# Patient Record
Sex: MALE | Race: WHITE
[De-identification: names, ages, dates, MRNs, and addresses within clinical notes are randomized per-mention and may not be internally consistent; named-entity substitution may affect disease eponyms.]

---

## 2017-03-04 ENCOUNTER — HOSPITAL ENCOUNTER (INPATIENT)
Dept: HOSPITAL 47 - 6ICU | Age: 82
LOS: 1 days | Discharge: HOME | DRG: 287 | End: 2017-03-05
Payer: MEDICARE

## 2017-03-04 VITALS — BODY MASS INDEX: 30.1 KG/M2

## 2017-03-04 DIAGNOSIS — K21.9: ICD-10-CM

## 2017-03-04 DIAGNOSIS — R07.9: Primary | ICD-10-CM

## 2017-03-04 DIAGNOSIS — Z82.49: ICD-10-CM

## 2017-03-04 DIAGNOSIS — E78.5: ICD-10-CM

## 2017-03-04 DIAGNOSIS — R01.1: ICD-10-CM

## 2017-03-04 DIAGNOSIS — N40.0: ICD-10-CM

## 2017-03-04 DIAGNOSIS — F17.200: ICD-10-CM

## 2017-03-04 DIAGNOSIS — J44.9: ICD-10-CM

## 2017-03-04 DIAGNOSIS — I10: ICD-10-CM

## 2017-03-04 DIAGNOSIS — I73.9: ICD-10-CM

## 2017-03-04 DIAGNOSIS — I25.10: ICD-10-CM

## 2017-03-04 DIAGNOSIS — E66.01: ICD-10-CM

## 2017-03-04 DIAGNOSIS — E03.9: ICD-10-CM

## 2017-03-04 DIAGNOSIS — Z79.899: ICD-10-CM

## 2017-03-04 DIAGNOSIS — Z79.82: ICD-10-CM

## 2017-03-04 LAB
ANION GAP SERPL CALC-SCNC: 10 MMOL/L
BUN SERPL-SCNC: 17 MG/DL (ref 9–20)
CALCIUM SPEC-MCNC: 8.9 MG/DL (ref 8.4–10.2)
CHLORIDE SERPL-SCNC: 105 MMOL/L (ref 98–107)
CO2 SERPL-SCNC: 26 MMOL/L (ref 22–30)
GLUCOSE SERPL-MCNC: 142 MG/DL (ref 74–99)
NON-AFRICAN AMERICAN GFR(MDRD): >60
POTASSIUM SERPL-SCNC: 4.1 MMOL/L (ref 3.5–5.1)
SODIUM SERPL-SCNC: 141 MMOL/L (ref 137–145)

## 2017-03-04 PROCEDURE — 93454 CORONARY ARTERY ANGIO S&I: CPT

## 2017-03-04 PROCEDURE — 85379 FIBRIN DEGRADATION QUANT: CPT

## 2017-03-04 PROCEDURE — 80048 BASIC METABOLIC PNL TOTAL CA: CPT

## 2017-03-04 PROCEDURE — 84132 ASSAY OF SERUM POTASSIUM: CPT

## 2017-03-04 PROCEDURE — 4A023N7 MEASUREMENT OF CARDIAC SAMPLING AND PRESSURE, LEFT HEART, PERCUTANEOUS APPROACH: ICD-10-PCS

## 2017-03-04 PROCEDURE — 85025 COMPLETE CBC W/AUTO DIFF WBC: CPT

## 2017-03-04 PROCEDURE — 71275 CT ANGIOGRAPHY CHEST: CPT

## 2017-03-04 PROCEDURE — B2111ZZ FLUOROSCOPY OF MULTIPLE CORONARY ARTERIES USING LOW OSMOLAR CONTRAST: ICD-10-PCS

## 2017-03-04 RX ADMIN — CARVEDILOL SCH MG: 3.12 TABLET, FILM COATED ORAL at 18:05

## 2017-03-04 RX ADMIN — I-VITE, TAB 1000-60-2MG (60/BT) SCH EACH: TAB at 20:37

## 2017-03-04 RX ADMIN — PANTOPRAZOLE SODIUM SCH MG: 40 TABLET, DELAYED RELEASE ORAL at 18:05

## 2017-03-04 RX ADMIN — CEFAZOLIN SCH MLS/HR: 330 INJECTION, POWDER, FOR SOLUTION INTRAMUSCULAR; INTRAVENOUS at 18:04

## 2017-03-04 RX ADMIN — ISOSORBIDE MONONITRATE SCH MG: 30 TABLET, EXTENDED RELEASE ORAL at 18:05

## 2017-03-04 NOTE — CT
CT CHEST FOR PULMONARY EMBOLISM.

 

EXAMINATION TYPE: CT chest angio for PE

 

DATE OF EXAM: 3/4/2017 9:36 PM

 

INDICATION: Elevated D-Dimer Post Cardiac Cath.

 

CT DLP: 424.4 mGycm, Automated exposure control for dose reduction was used.

 

CONTRAST: Patient injected with 100 mL of Omnipaque 350.

 

COMPARISON: 5/28/2015

 

TECHNIQUE: CT of the chest is performed on a spiral scan at 2 mm thick sections.  Study is performed 
with intravenous contrast timed for evaluation for pulmonary embolism.  This will limit additional po
rtions of the evaluation.  3-D MIP images reconstructed by the technologist are reviewed on the compu
ter in the coronal and sagittal planes. 

 

FINDINGS:

No persistent filling defects are evident to suggest an acute pulmonary embolism.

 

There are enlarged pretracheal lymph nodes the larger measuring 1. 4 and1.0 cm in size. Aortopulmonic
 window lymphadenopathy is present measuring 1.0 cm. Superior mediastinal nodes are present not enlar
ged by CT criteria.  The ascending aorta diameter at the level of the main pulmonary artery is 3.4 cm
.  The main pulmonary artery diameter at the bifurcation is 2.5 cm.

 

There is an area of pneumonitis within the lingula. Underlying mass is not excluded. Follow-up is rec
ommended. Series 5 image 61. There is a pneumatocele within the posterior lateral right lung base. Th
ere is a calcified granuloma within the posterior lateral right lung apex measuring 0.5 cm. Series 5 
image 34. Punctate peripheral density measuring 3 mm in the posterior lateral right upper lung field.
 Series 5 image 46. Lung windows are otherwise clear.

 

Limited CT sections are obtained through the upper abdomen. There is a 3.7 cm cyst measuring 20 Houns
field units on the posterior lateral superior pole right kidney. There may be superior pole renal jana
cification on the right measuring 0.9 cm. Vascular calcification is within the aorta. Retrocaval leslie
on appears unremarkable. No retrocrural adenopathy is evident.

 

IMPRESSIONS:

1. No acute pulmonary embolism.

2. Enlarged mediastinal adenopathy. Follow-up is recommended.

3. Scattered areas of pneumonitis and tiny pulmonary nodules. Follow-up chest CT in 3 months is recom
mended to confirm stability and/or clearing

## 2017-03-04 NOTE — P.HPIM
History of Present Illness


H&P Date: 03/04/17


Chief Complaint: Unstable angina, recurrent chest pain, severe dyspnea and 

shortness of kwame





84-year-old  male one of my office patient with multiple medical 

problem known to have history of COPD, hypertension, hyperlipidemia, 

hyperglycemia, BPH and previous history of disease was on medical management 

with heart catheter 3 years ago finding consistent with mild coronary artery 

disease.  Patient has been doing well until the day before yesterday when he 

attempts to shovel and cleaning this know of his yard developed to have mild 

dyspnea and shortness of breath or symptoms become much worse over time.  His 

shortness of breath continue to be bothersome.  Patient ended up coming to the 

emergency department at [facility] where was seen and evaluated 

surprisingly his EKG had significant abnormality in change.  Patient was seen 

by Dr. Myrick and decided to transfer him to [facility] for heart 

catheter.  Was admitted to the ICU initially and prepare for going for cath.  

His troponin was marginal with no further abnormality at this point.





Review of Systems


Constitutional: Reports fatigue, Reports weakness, Reports weight gain, Denies 

as per HPI, Denies anorexia, Denies chills, Denies chronic headaches, Denies 

chronic pain, Denies daytime sleepiness, Denies fever, Denies lethargy, Denies 

malaise, Denies night sweats, Denies poor appetite, Denies sweats, Denies 

weight loss


Eyes: bilateral as per HPI, bilateral decreased vision


Ears: bilateral: decreased hearing


Ears, nose, mouth and throat: Reports headache, Reports nasal congestion, 

Reports nasal discharge, Reports sinus pressure, Denies as per HPI, Denies ant. 

neck pain, Denies bleeding gums, Denies dental pain, Denies dysphagia, Denies 

epistaxis, Denies hoarseness, Denies mouth pain, Denies neck fullness/pressure, 

Denies neck lump, Denies nose pain, Denies odynophagia, Denies post-nasal drip, 

Denies sinus pain, Denies swelling in mouth, Denies swelling in throat, Denies 

sore throat, Denies vertigo, Denies voice changes


Cardiovascular: Reports chest pain, Reports claudication, Reports decreased 

exercise tolerance, Reports dyspnea on exertion, Reports edema, Reports high 

blood pressure, Reports irregular heart beat, Reports leg edema, Reports 

lightheadedness, Reports palpitations, Reports paroxysmal nocturnal dyspnea, 

Reports rapid heart beat, Reports shortness of breath, Denies as per HPI, 

Denies orthopnea, Denies phlebitis, Denies syncope


Respiratory: Reports congestion, Reports cough with sputum, Reports dyspnea, 

Reports home oxygen, Reports pleurisy, Reports respiratory infections, Reports 

sleep apnea, Denies as per HPI, Denies cough, Denies excessive sputum, Denies 

hemoptysis, Denies pain, Denies pain on inspiration, Denies snoring, Denies 

wheezing


Gastrointestinal: Reports belching, Reports bloating, Reports change in bowel 

habits, Reports constipation, Reports dyspepsia, Reports excessive gas, Reports 

heartburn, Reports loss of appetite, Reports nausea, Denies as per HPI, Denies 

abdominal pain, Denies BRBPR, Denies coffee ground emesis, Denies diarrhea, 

Denies early satiety, Denies hematemesis, Denies hematochezia, Denies 

indigestion, Denies jaundice, Denies lactose intolerance, Denies melena, Denies 

vomiting


Genitourinary: Reports discharge, Reports flank pain, Reports nocturia, Reports 

polyuria, Denies as per HPI, Denies decreased libido, Denies difficulties 

fathering child, Denies dysuria, Denies erectile dysfunction, Denies genital 

sores, Denies hematuria, Denies impotence, Denies incontinence, Denies kidney 

stones, Denies testicular lump, Denies testicular pain, Denies urinary frequency

, Denies urinary hesitancy, Denies urinary retention


Musculoskeletal: Reports arm numbness/tingling, Reports limitation of motion, 

Reports loss of height, Reports low back pain, Reports myalgias, Reports neck 

pain, Reports neck stiffness, Denies as per HPI, Denies atrophy, Denies 

fractures, Denies frequent falls, Denies gait dysfunction, Denies hot joints, 

Denies leg numbness/tingling, Denies morning stiffness, Denies muscle cramps, 

Denies muscle weakness, Denies prior amputations, Denies redness of joints, 

Denies shooting arm pain, Denies shooting leg pain


Integumentary: Reports dryness, Reports pruritus, Reports rash, Denies as per 

HPI, Denies acne, Denies boils, Denies brittle nails, Denies change in hair/

nails, Denies color changes, Denies darkening of skin, Denies depigmentation, 

Denies foot/leg ulcers, Denies growths, Denies hirsutism, Denies lesions, 

Denies onychomycosis, Denies sores, Denies striae, Denies unusual bruising, 

Denies wounds


Neurological: Reports ataxia, Reports numbness, Reports paresthesias, Reports 

tingling, Reports tremors, Reports weakness, Denies as per HPI, Denies aphasia, 

Denies balance difficulties, Denies burning pain, Denies change in mentation, 

Denies change in smell/taste, Denies change in speech, Denies confusion, Denies 

convulsions, Denies double vision, Denies gait dysfunction, Denies head injury, 

Denies headaches, Denies hearing difficulties, Denies lack of coordination, 

Denies loss of vision, Denies memory loss, Denies migraines, Denies motor 

disturbance, Denies paralysis, Denies seizures, Denies sensory deficit, Denies 

spasticity, Denies syncope, Denies tic, Denies transient paralysis, Denies 

vertigo, Denies visual changes


Psychiatric: Reports anhedonia, Reports anxiety, Reports depression, Reports 

memory loss, Reports mood swings, Denies as per HPI, Denies anxiety attacks, 

Denies change in appetite, Denies change in libido, Denies change in sleep 

habits, Denies confusion, Denies difficulty concentrating, Denies disorientation

, Denies hallucinations, Denies hopelessness, Denies hypersomnia, Denies 

insomnia, Denies irritability, Denies paranoia, Denies sadness/tearfulness, 

Denies sleep disturbances, Denies suicidal ideation


Endocrine: Reports excessive sweating, Reports fatigue, Reports flushing, 

Reports heat intolerance, Denies as per HPI, Denies cold intolerance, Denies 

deepening of the voice, Denies excessive thirst, Denies high blood sugars, 

Denies increase in ring/shoe/hat size, Denies low blood sugars, Denies nocturia

, Denies palpitations, Denies polydipsia, Denies polyphagia, Denies polyuria, 

Denies proptosis, Denies recent glucocorticoid use, Denies thyroid mass, Denies 

weight change


Hematologic/Lymphatic: Reports easy bruising, Denies as per HPI, Denies easy 

bleeding, Denies lymphadenopathy, Denies lymphedema, Denies thrombophilia


Allergic/Immunologic: Reports allergic rhinitis, Denies as per HPI, Denies 

anaphylaxis, Denies angioedema, Denies gluten intolerance, Denies persistent 

infections, Denies seasonal allergies, Denies urticaria, Denies wheezing





Past Medical History


Past Medical History: Hyperlipidemia, Hypertension, Thyroid Disorder


History of Any Multi-Drug Resistant Organisms: None Reported


Past Surgical History: Appendectomy, Cholecystectomy, Heart Catheterization, 

Orthopedic Surgery


Past Anesthesia/Blood Transfusion Reactions: No Reported Reaction


Past Psychological History: No Psychological Hx Reported


Smoking Status: Current every day smoker


Past Alcohol Use History: None Reported


Past Drug Use History: None Reported





- Past Family History


  ** Mother


Family Medical History: CVA/TIA, Hypertension





  ** Father


Additional Family Medical History / Comment(s): commited suicide





Medications and Allergies


 Home Medications











 Medication  Instructions  Recorded  Confirmed  Type


 


Aspirin EC [Ecotrin] 325 mg PO HS 05/28/15 05/28/15 History


 


Isosorbide Mononitrate ER [Imdur] 30 mg PO DAILY 05/28/15 05/28/15 History


 


Levothyroxine Sodium [Synthroid] 75 mcg PO DAILY 05/28/15 05/28/15 History


 


Omeprazole [PriLOSEC] 20 mg PO AC-BID 05/28/15 05/28/15 History


 


Temazepam [Restoril] 15 mg PO HS PRN 05/28/15 05/28/15 History


 


Vit A,C & E/Lutein/Minerals 1 each PO BID 05/28/15 05/28/15 History





[Ocuvite with Lutein Tablet]    











 Allergies











Allergy/AdvReac Type Severity Reaction Status Date / Time


 


No Known Allergies Allergy   Verified 05/28/15 18:53














Physical Exam


Vitals: 


 Intake and Output











 03/03/17 03/04/17 03/04/17





 22:59 06:59 14:59


 


Other:   


 


  Weight   84.7 kg








 Patient Weight











 03/05/17





 06:59


 


Weight 84.7 kg














- Constitutional


General appearance: no average body habitus, cooperative, no disheveled, no 

mild distress, no morbidly obese, no no acute distress, no obese, no severe 

distress, thin





- EENT


Eyes: no abnormal pupil, no anicteric sclerae, no disc margins sharp, no 

edentulous, no EOMI, no PERRLA, no fundus normal, no photophobia, no dentition 

normal, no poor dentition, no ptosis, no scleral icterus, normal appearance


ENT: hard of hearing, no hearing grossly normal, no NA/AT, normal oropharynx, 

no other, no pharyngeal erythema, no thrush, no tonsillar exudates, no 

tonsillar swelling


Ears: bilateral: normal, bulging





- Neck


Neck: no lymphadenopathy, normal ROM, no other, no rigidity, no stridor, no 

thyromegaly


Carotids: bilateral: upstroke normal


Thyroid: bilateral: normal size, enlarged





- Respiratory


Respiratory: bilateral: CTA, diminished





- Cardiovascular


Rhythm: regular


Heart sounds: normal: S1, S2


Abnormal Heart Sounds: systolic murmur, S3 Gallop





- Gastrointestinal


General gastrointestinal: no absent bowel sounds, no decreased bowel sounds, 

distended, hepatomegaly, hyperactive bowel sounds, no normal bowel sounds, no 

organomegaly, no rigid, no scaphoid, soft, no splenomegaly, no tenderness, no 

umbilical hernia, no ventral hernia





- Integumentary


Integumentary: no calor, no cellulitis, no cyanotic, no decreased turgor, no 

flushed, no jaundiced, normal, no normal turgor, pale, rash, no ulcer





- Neurologic


Neurologic: CNII-XII intact





- Musculoskeletal


Musculoskeletal: gait normal, generalized weakness, no strength equal 

bilaterally, no right sided weakness, no left sided weakness





- Psychiatric


Psychiatric: A&O x's 3, appropriate affect





Thrombosis Risk Factor Assmnt





- DVT/VTE Prophylaxis


DVT/VTE Prophylaxis: Pharmacologic Prophylaxis ordered, Mechanical Prophylaxis 

ordered





Assessment and Plan


Plan: 





1 unstable angina: Patient be hospitalized continue heparin drip continue nitro 

patient be seen cardiology will be going for heart cath today.





2 CAD: Known to have coronary disease from previous heart cath with medical 

management up to now with his recurrent symptom in recurrent chest pain and 

shortness of breath his heart cath this time might be positive might require 

angioplasty.





3 COPD: Continue patient on his current inhaler.





4 hypertension: Remain on carvedilol 3.25 g twice a day.





5 hyperlipidemia: On atorvastatin 80 mg daily.





6 hypothyroidism: Remain on Synthroid 75 g daily.





7 severe GERD: Will do omeprazole 20 mg twice a day.





8 DVT prophylaxis: Patient will be on heparin drip for now and switch to 

heparin subcutaneous and knee-high GIL hose.





CODE STATUS: Full code.





Expectation from's admission: Patient in the hospital for more than 2 nights.

## 2017-03-04 NOTE — CC
DATE OF SERVICE:  03/04/2017 



PERFORMING PHYSICIAN: 

Kyle Church MD, interventional cardiologist. 



PROCEDURE PERFORMED: 

Selective right and left coronary angiogram. 



INDICATION: This is a pleasant 84-year-old gentleman with known 

history of coronary artery disease, hypertension, dyslipidemia 

presented to [facility] Emergency Room with chest 

discomfort. He was seen and evaluated by Dr. Myrick who noted ischemic 

EKG changes. Heart catheterization was recommended from the left 

radial approach in view of the patient's history of peripheral 

arterial disease and prior femorofemoral bypass.  



COMPLICATIONS: 

None. 



LEVEL OF SEDATION: 

Moderate with sedation length of 30 minutes. 



APPROACH: 

Left radial artery. 



PROCEDURE DESCRIPTION: After obtaining informed consent, the patient 

was brought to the cardiac cath lab. The left radial artery was 

cannulated using micropuncture technique. The micropuncture wire 

passed easily. Then I placed 6 Nepali sheath in the left radial 

artery. Subsequently I gave the patient 2 mg of verapamil IA and 3000 

units of heparin IV. Then I did selective right and left coronary 

angiogram using JR4 and JL 3.5 catheters.  



The procedure was completed without any complication. 



SELECTIVE CORONARY ANGIOGRAM: 

1. The right coronary artery is a large-caliber vessel, a dominant 

vessel, and calcified vessel. The ostial right coronary artery has 

mild disease only. The midright has mild disease only as well. The 

right distally appeared to be angiographically normal and bifurcates 

into PDA and PLV branches; both are angiographically normal. The right 

coronary artery in the midportion gives rise into a medium size acute 

marginal branch.  

2. The left main is calcified with ostial and distal left main 

disease, appeared to be in the range of 30%. The left main bifurcates 

into the left circumflex and left anterior descending artery.  

3. The left circumflex is a large-caliber vessel. It is a nondominant 

vessel. The proximal left circumflex is angiographically normal and 

gives rises into the first OM branch, which is a large-caliber vessel, 

seems to be angiographically normal. The left circumflex after the 

first OM has a lesion seems to be in the range of 50% to 60%. The left 

circumflex distally has mild disease only.  

4. Left anterior descending artery. The proximal left anterior 

descending artery appeared to have mild disease only. This is by the 

bifurcation of the first diagonal branch, which is a medium caliber 

vessel, seems to be angiographically normal. The mid LAD and distal 

LAD appeared to be angiographically normal.  



CONCLUSION: 

1. Dominant right coronary system. 

2. Calcified right and left coronary. 

3. Intermediate disease involving the mid left circumflex coronary 

artery.  

4. Intermediate disease involving ostial and distal left main coronary 

artery.  



POSTPROCEDURE MANAGEMENT: 

1. Maximize medical treatment is recommended. 

2. Follow up with the patient.

## 2017-03-05 VITALS — TEMPERATURE: 98 F

## 2017-03-05 VITALS — DIASTOLIC BLOOD PRESSURE: 68 MMHG | RESPIRATION RATE: 21 BRPM | SYSTOLIC BLOOD PRESSURE: 140 MMHG | HEART RATE: 75 BPM

## 2017-03-05 LAB
ANION GAP SERPL CALC-SCNC: 8 MMOL/L
BASOPHILS # BLD AUTO: 0 K/UL (ref 0–0.2)
BASOPHILS NFR BLD AUTO: 1 %
BUN SERPL-SCNC: 15 MG/DL (ref 9–20)
CALCIUM SPEC-MCNC: 8.8 MG/DL (ref 8.4–10.2)
CH: 30.6
CHCM: 33.1
CHLORIDE SERPL-SCNC: 105 MMOL/L (ref 98–107)
CO2 SERPL-SCNC: 27 MMOL/L (ref 22–30)
EOSINOPHIL # BLD AUTO: 0.3 K/UL (ref 0–0.7)
EOSINOPHIL NFR BLD AUTO: 7 %
ERYTHROCYTE [DISTWIDTH] IN BLOOD BY AUTOMATED COUNT: 4.07 M/UL (ref 4.3–5.9)
ERYTHROCYTE [DISTWIDTH] IN BLOOD: 14 % (ref 11.5–15.5)
GLUCOSE SERPL-MCNC: 112 MG/DL (ref 74–99)
HCT VFR BLD AUTO: 37.8 % (ref 39–53)
HDW: 2.6
HGB BLD-MCNC: 12 GM/DL (ref 13–17.5)
LUC NFR BLD AUTO: 6 %
LYMPHOCYTES # SPEC AUTO: 1.2 K/UL (ref 1–4.8)
LYMPHOCYTES NFR SPEC AUTO: 28 %
MCH RBC QN AUTO: 29.4 PG (ref 25–35)
MCHC RBC AUTO-ENTMCNC: 31.7 G/DL (ref 31–37)
MCV RBC AUTO: 92.9 FL (ref 80–100)
MONOCYTES # BLD AUTO: 0.5 K/UL (ref 0–1)
MONOCYTES NFR BLD AUTO: 13 %
NEUTROPHILS # BLD AUTO: 1.9 K/UL (ref 1.3–7.7)
NEUTROPHILS NFR BLD AUTO: 45 %
NON-AFRICAN AMERICAN GFR(MDRD): >60
POTASSIUM SERPL-SCNC: 3.8 MMOL/L (ref 3.5–5.1)
SODIUM SERPL-SCNC: 140 MMOL/L (ref 137–145)
WBC # BLD AUTO: 0.27 10*3/UL
WBC # BLD AUTO: 4.3 K/UL (ref 3.8–10.6)
WBC (PEROX): 4.43

## 2017-03-05 RX ADMIN — PANTOPRAZOLE SODIUM SCH MG: 40 TABLET, DELAYED RELEASE ORAL at 08:36

## 2017-03-05 RX ADMIN — ISOSORBIDE MONONITRATE SCH MG: 30 TABLET, EXTENDED RELEASE ORAL at 08:36

## 2017-03-05 RX ADMIN — CEFAZOLIN SCH MLS/HR: 330 INJECTION, POWDER, FOR SOLUTION INTRAMUSCULAR; INTRAVENOUS at 03:49

## 2017-03-05 RX ADMIN — CARVEDILOL SCH MG: 3.12 TABLET, FILM COATED ORAL at 08:38

## 2017-03-05 RX ADMIN — I-VITE, TAB 1000-60-2MG (60/BT) SCH EACH: TAB at 08:36

## 2017-03-05 NOTE — P.DS
Providers


Date of admission: 


03/04/17 11:57





Attending physician: 


Sonido Weber





Consults: 





 





03/04/17 12:32


Consult Physician Routine 


   Consulting Provider: Kyle Church


   Consult Reason/Comments: ekg changes


   Do you want consulting provider notified?: Already Contacted











Primary care physician: 


Sonido Weber





[facility] Course: 





Chief Complaint: Unstable angina, recurrent chest pain, severe dyspnea and 

shortness of kwame





84-year-old  male one of my office patient with multiple medical 

problem known to have history of COPD, hypertension, hyperlipidemia, 

hyperglycemia, BPH and previous history of disease was on medical management 

with heart catheter 3 years ago finding consistent with mild coronary artery 

disease.  Patient has been doing well until the day before yesterday when he 

attempts to shovel and cleaning this know of his yard developed to have mild 

dyspnea and shortness of breath or symptoms become much worse over time.  His 

shortness of breath continue to be bothersome.  Patient ended up coming to the 

emergency department at [facility] where was seen and evaluated 

surprisingly his EKG had significant abnormality in change.  Patient was seen 

by Dr. Myrick and decided to transfer him to [facility] for heart 

catheter.  Was admitted to the ICU initially and prepare for going for cath.  

His troponin was marginal with no further abnormality at this point.





Patient was resting comfortably until he ended up going for his heart cath.  

Findings were consistent exactly with the same finding 3 years ago as a mild 

coronary artery disease does not requiring except medical management no 

angioplasty or stent was placed at this time.


The area of heart cath looks good with no sign of hematoma.








 Assessment and Plan 


Plan: 





1 mild coronary artery disease: Heart catheter was done with finding consistent 

with mild disease only will be on medical management.





2 chest pain: Patient be hospitalized continue heparin drip continue nitro 

patient be seen cardiology, cardiac catheter came back with no major finding 

just mild coronary disease exactly the same finding was 3 years ago and patient 

be on medical management only.





3 CAD: Known to have coronary disease from previous heart cath with medical 

management up to now with his recurrent symptom in recurrent chest pain and 

shortness of breath his heart cath this time might be positive might require 

angioplasty.





4 COPD: Continue patient on his current inhaler.





5 hypertension: Remain on carvedilol 3.25 g twice a day.





6 hyperlipidemia: On atorvastatin 80 mg daily.





7 hypothyroidism: Remain on Synthroid 75 g daily.





8 severe GERD: Will do omeprazole 20 mg twice a day.





9 DVT prophylaxis: Patient will be on heparin drip for now and switch to 

heparin subcutaneous and knee-high GIL hose.








Patient has done very well his final diagnoses was mild coronary disease with 

negative testing require any angioplasty or stent placement.





Plan - Discharge Summary


New Discharge Prescriptions: 


Furosemide [Lasix] 20 mg PO Q48H #30 tab


amLODIPine [Norvasc] 10 mg PO DAILY #30 tab


Discharge Medication List





Aspirin EC [Ecotrin] 325 mg PO HS 05/28/15 [History]


Isosorbide Mononitrate ER [Imdur] 30 mg PO DAILY 05/28/15 [History]


Omeprazole [PriLOSEC] 20 mg PO AC-BID 05/28/15 [History]


Temazepam [Restoril] 15 mg PO HS PRN 05/28/15 [History]


Vit A,C & E/Lutein/Minerals [Ocuvite with Lutein Tablet] 1 tab PO BID 05/28/15 [

History]


Fludrocortisone [Florinef] 0.05 mg PO DAILY 03/04/17 [History]


Levothyroxine Sodium [Synthroid] 100 mcg PO DAILY 03/04/17 [History]


Simvastatin [Zocor] 40 mg PO HS 03/04/17 [History]


Albuterol Inhaler [Ventolin Hfa Inhaler] 2 puff INHALATION RT-QID PRN #0 puff 03 /05/17 [Rx]


Carvedilol [Coreg] 3.125 mg PO BID-W/MEALS  tab 03/05/17 [Rx]


Furosemide [Lasix] 20 mg PO Q48H #30 tab 03/05/17 [Rx]


Nitroglycerin Sl Tabs [Nitrostat] 0.4 mg SUBLINGUAL Q5M PRN #0 tab 03/05/17 [Rx]


amLODIPine [Norvasc] 10 mg PO DAILY #30 tab 03/05/17 [Rx]








Follow up Appointment(s)/Referral(s): 


Sonido Weber MD [Primary Care Provider] - 1 Week


Patient Instructions/Handouts:  Dyspnea (GEN)

## 2017-03-06 NOTE — CONS
DATE OF CONSULTATION:  





CHIEF COMPLAINT: Chest tightness and shortness of breath.



Nabeel  is an 84-year-old gentleman with history of hypertension and 

dyslipidemia.  This is an 84-year-old gentleman with history of 

hypertension and dyslipidemia who used to see me in the past, has not 

been  seen by me over the last few years, comes in complaining of 

moderate to severe in intensity exertion, shortness of breath and 

exertional chest tightness that started on Thursday.  It has been 

getting progressively worse. Hence he came to the emergency room where 

I am evaluating him. He presented to [facility].  His 

is pain is resolved with rest. It is associated with mild diaphoresis. 

EKG shows sinus rhythm with deep symmetrical T-wave inversions in the 

precordial leads suggestive of ischemia in LAD distribution. Patient 

states that he had a cardiac catheterization by me many years ago and 

was told that he did not have significant obstructive CAD. He also has 

hypertension, and dyslipidemia. 



MEDICATIONS: Patient is currently on:

1. Imdur 30 mg daily.

2. Synthroid 75 mcg daily.

3. Simvastatin 40 mg daily.

4. Aspirin.

5. Coreg.

6. Prilosec. 



ALLERGIES: He denies any. 



FAMILY HISTORY: Negative for premature coronary artery disease. 



SOCIAL HISTORY: Negative for smoking, ETOH abuse, or drug abuse. 



REVIEW OF SYSTEMS:

HEENT: Unremarkable. 

CARDIAC: As described above. 

RESPIRATORY:  As described above. 

GI: Negative. 

GENITOURINARY: Negative.

ENDOCRINE: Negative. 

SKIN: Negative. 

MUSCULOSKELETAL: Significant for arthritis. 

PSYCHOSOCIAL: Negative. 

ENDOCRINE: Negative. 

Dermatology: Negative. 

CONSTITUTIONAL: Negative. Oncological: Negative. 



The rest of the system review is not relevant. 



Past medical history is significant for hypertension, dyslipidemia, 

GERD and possible CAD. Past medical history also significant for 

peripheral vascular disease, femorofemoral crossover bypass graft many 

years ago done by Dr. Valencia, follows with Dr. Loomis regularly. 



On exam he is comfortable at rest. Heart rate is 65 beats per minute, 

blood pressure is 160/72, respiratory rate is 18. There is no jugular 

venous distention. Carotid upstroke is normal. There is no bruit. 

Chest exam reveals good air entry bilaterally. Heart exam reveals 

first and second heart sounds. No gallop. No murmur, no rub.  

ABDOMEN: Soft, nontender. Exam of extremities did not reveal any 

edema. Peripheral pulses are palpable, but diminished. Radial pulses 

are palpable.  

CARDIAC :  On exam there is an ejection systolic murmur in the aortic 

area.  



Labs are. Labs are pending at this time. 



ASSESSMENT:

1. Unstable angina. 

2. History of hypertension. 

3. History of dyslipidemia. 

4. Coronary artery disease.

5. Hypothyroidism. 

6. Peripheral vascular disease, status post femorofemoral crossover 

bypass graft.  



PLAN: I am going to transfer the patient, using optimal medical 

therapy at the moment. We will transfer him over to [facility] for an emergent cardiac catheterization with a view to 

performing angioplasty.  My associate, Dr. Church perform this.  This 

will be a radial cath.

## 2017-06-26 ENCOUNTER — HOSPITAL ENCOUNTER (OUTPATIENT)
Dept: HOSPITAL 47 - RADCTMAIN | Age: 82
Discharge: HOME | End: 2017-06-26
Payer: MEDICARE

## 2017-06-26 DIAGNOSIS — N28.89: Primary | ICD-10-CM

## 2017-06-26 DIAGNOSIS — R31.9: ICD-10-CM

## 2017-06-26 DIAGNOSIS — I72.4: ICD-10-CM

## 2017-06-26 DIAGNOSIS — K57.30: ICD-10-CM

## 2017-06-26 PROCEDURE — 74176 CT ABD & PELVIS W/O CONTRAST: CPT

## 2017-06-26 NOTE — CT
EXAMINATION TYPE: CT abdomen pelvis wo con

 

DATE OF EXAM: 6/26/2017

 

COMPARISON: Correlation CT chest 3/4/2017.

 

HISTORY: 84-year-old male stones, hematuria

 

CT DLP: 608.4 mGycm. Automated exposure control for dose reduction was used.

 

TECHNIQUE: Contiguous axial scanning of the abdomen and pelvis without IV contrast. Coronal and sagit
chuckie reconstructions performed. 

 

FINDINGS: 

The heart is normal size without pericardial effusion. However, there are coronary vessel calcificati
ons which are a marker for coronary artery disease.

 

Partially visualized right paraesophageal lymphadenopathy measuring up to 1.5 cm. This appears slight
ly less bulky as compared to 3/4/2017 but is incompletely imaged. Interstitial and reticular changes 
are present in the lower lungs especially anteriorly. Nonspecific air cyst posterior right lower lobe
. No pleural effusion.

 

Noncontrast appearance of the liver, adrenal glands, left kidney, spleen, and pancreas within normal 
limits.

 

2.8 cm cyst within the lateral right kidney. No hydronephrosis on either side. However, there were a 
couple pain and 2 mm densities along the distal right ureter, axial image 123 and 125.

 

Mildly enlarged upper abdominal lymph node in the pericaval region measuring 1 cm is unchanged. No me
senteric lymphadenopathy seen.

 

No dilated small bowel, free fluid, or free air.

 

Tiny fatty umbilical hernia.

 

There is distal colonic diverticulosis, greatest in the sigmoid colon. No pericolonic inflammation.

 

Bladder is underdistended but appears to have circumferential wall thickening.  No abnormal fluid col
lection in the pelvis or pelvic lymphadenopathy.

 

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
t common femoral artery, axial image 135. The lack of IV contrast limits assessment of patency.  Mode
rate atherosclerotic calcifications within the abdominal aorta and iliac arteries. 

 

Bones: Degenerative changes in the lumbar spine. No osseous destructive process.

 

 

 

IMPRESSION: 

1.  Partially visualized paraesophageal lymphadenopathy as seen on chest CT of 3/4/2017. Further clin
ical correlation is recommended as to etiology.

2.  No hydronephrosis on either side. However, a couple punctate densities are present along the dist
al right ureter that could represent tiny 2 mm or smaller ureteral calculi.

3.  Distal colonic diverticulosis, greatest in the sigmoid colon.

4.  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

## 2017-07-29 ENCOUNTER — HOSPITAL ENCOUNTER (OUTPATIENT)
Dept: HOSPITAL 47 - RADPETMAIN | Age: 82
Discharge: HOME | End: 2017-07-29
Payer: MEDICARE

## 2017-07-29 DIAGNOSIS — R59.9: Primary | ICD-10-CM

## 2017-07-29 PROCEDURE — 78815 PET IMAGE W/CT SKULL-THIGH: CPT

## 2017-07-31 NOTE — PE
Nuclear medicine PET/CT

 

HISTORY: Reactive hyperplasia lymph node, lymphadenopathy, R 59.9

 

Patient received 10.2 mCi F-18 FDG intravenously delayed scanning was performed from the skull base t
o the mid thighs. Localization and attenuation correction CT scan was also performed. Exam is correla
deana to CT abdomen pelvis 6/26/2017, CT chest 3/4/2017

 

Neck and chest: Retrocaval pretracheal mediastinal adenopathy is again noted, there are prevascular n
odes also present as well as small nodes in the superior mediastinum. Subcarinal and bilateral hilar 
nodes are also present.  There is associated hypermetabolic uptake within the mediastinum, retrocaval
 pretracheal nodes, SUV is 7.1. Left upper lobe lung nodule medially measures approximately 12 mm, th
is is pleural-based along the anterior mediastinum, axial image 93 and 94, SUV is  4.4. Hilar uptake 
SUV 5-7.3. Subcarinal adria uptake 7.7. Prevascular node SUV 6.2. Superior mediastinal node SUV 2.8.

 

Abdomen pelvis: At the level of the head of the pancreas there is a node present anterior to the infe
rior vena cava which measures approximately 16 mm in short axis, associated hypermetabolic uptake is 
showing SUV 6.6. Some enlarged nodes also present at the level of the marlo hepatus, SUV is 5.9.

 

Femoral-femoral bypass graft is noted. No free fluid.

 

Osseous structures: Degenerative disc disease, facet arthropathy noted in the lumbar spine.

 

IMPRESSION: Adenopathy as described.

## 2017-08-25 ENCOUNTER — HOSPITAL ENCOUNTER (OUTPATIENT)
Dept: HOSPITAL 47 - LABPAT | Age: 82
Discharge: HOME | End: 2017-08-25
Payer: MEDICARE

## 2017-08-25 DIAGNOSIS — Z01.812: Primary | ICD-10-CM

## 2017-08-25 DIAGNOSIS — R91.8: ICD-10-CM

## 2017-08-25 LAB
ANION GAP SERPL CALC-SCNC: 8 MMOL/L
BASOPHILS # BLD AUTO: 0.1 K/UL (ref 0–0.2)
BASOPHILS NFR BLD AUTO: 1 %
BUN SERPL-SCNC: 16 MG/DL (ref 9–20)
CH: 31.6
CHCM: 33.2
CHLORIDE SERPL-SCNC: 104 MMOL/L (ref 98–107)
CO2 SERPL-SCNC: 25 MMOL/L (ref 22–30)
EOSINOPHIL # BLD AUTO: 0.3 K/UL (ref 0–0.7)
EOSINOPHIL NFR BLD AUTO: 6 %
ERYTHROCYTE [DISTWIDTH] IN BLOOD BY AUTOMATED COUNT: 4.42 M/UL (ref 4.3–5.9)
ERYTHROCYTE [DISTWIDTH] IN BLOOD: 14.1 % (ref 11.5–15.5)
HCT VFR BLD AUTO: 42.2 % (ref 39–53)
HDW: 2.54
HGB BLD-MCNC: 13.9 GM/DL (ref 13–17.5)
LUC NFR BLD AUTO: 4 %
LYMPHOCYTES # SPEC AUTO: 1.3 K/UL (ref 1–4.8)
LYMPHOCYTES NFR SPEC AUTO: 24 %
MCH RBC QN AUTO: 31.4 PG (ref 25–35)
MCHC RBC AUTO-ENTMCNC: 32.9 G/DL (ref 31–37)
MCV RBC AUTO: 95.4 FL (ref 80–100)
MONOCYTES # BLD AUTO: 0.5 K/UL (ref 0–1)
MONOCYTES NFR BLD AUTO: 10 %
NEUTROPHILS # BLD AUTO: 2.9 K/UL (ref 1.3–7.7)
NEUTROPHILS NFR BLD AUTO: 55 %
NON-AFRICAN AMERICAN GFR(MDRD): 60
PH UR: 5 [PH] (ref 5–8)
POTASSIUM SERPL-SCNC: 4.7 MMOL/L (ref 3.5–5.1)
SODIUM SERPL-SCNC: 137 MMOL/L (ref 137–145)
SP GR UR: 1.01 (ref 1–1.03)
UA BILLING (MACRO VS. MICRO): (no result)
UROBILINOGEN UR QL STRIP: <2 MG/DL (ref ?–2)
WBC # BLD AUTO: 0.22 10*3/UL
WBC # BLD AUTO: 5.3 K/UL (ref 3.8–10.6)
WBC (PEROX): 5.67

## 2017-08-25 PROCEDURE — 84520 ASSAY OF UREA NITROGEN: CPT

## 2017-08-25 PROCEDURE — 85025 COMPLETE CBC W/AUTO DIFF WBC: CPT

## 2017-08-25 PROCEDURE — 82565 ASSAY OF CREATININE: CPT

## 2017-08-25 PROCEDURE — 80051 ELECTROLYTE PANEL: CPT

## 2017-08-25 PROCEDURE — 81003 URINALYSIS AUTO W/O SCOPE: CPT

## 2017-08-31 ENCOUNTER — HOSPITAL ENCOUNTER (OUTPATIENT)
Dept: HOSPITAL 47 - LABPAT | Age: 82
Discharge: HOME | End: 2017-08-31
Payer: MEDICARE

## 2017-08-31 DIAGNOSIS — Z01.812: Primary | ICD-10-CM

## 2017-08-31 PROCEDURE — 86900 BLOOD TYPING SEROLOGIC ABO: CPT

## 2017-08-31 PROCEDURE — 86850 RBC ANTIBODY SCREEN: CPT

## 2017-08-31 PROCEDURE — 86901 BLOOD TYPING SEROLOGIC RH(D): CPT

## 2017-09-01 ENCOUNTER — HOSPITAL ENCOUNTER (INPATIENT)
Dept: HOSPITAL 47 - 2ORMAIN | Age: 82
LOS: 14 days | Discharge: HOME HEALTH | DRG: 166 | End: 2017-09-15
Payer: MEDICARE

## 2017-09-01 VITALS — BODY MASS INDEX: 30 KG/M2

## 2017-09-01 DIAGNOSIS — Z79.899: ICD-10-CM

## 2017-09-01 DIAGNOSIS — K21.9: ICD-10-CM

## 2017-09-01 DIAGNOSIS — F17.200: ICD-10-CM

## 2017-09-01 DIAGNOSIS — R33.8: ICD-10-CM

## 2017-09-01 DIAGNOSIS — R59.0: ICD-10-CM

## 2017-09-01 DIAGNOSIS — N40.1: ICD-10-CM

## 2017-09-01 DIAGNOSIS — H35.30: ICD-10-CM

## 2017-09-01 DIAGNOSIS — Z88.8: ICD-10-CM

## 2017-09-01 DIAGNOSIS — I73.9: ICD-10-CM

## 2017-09-01 DIAGNOSIS — E78.5: ICD-10-CM

## 2017-09-01 DIAGNOSIS — I25.10: ICD-10-CM

## 2017-09-01 DIAGNOSIS — C34.12: Primary | ICD-10-CM

## 2017-09-01 DIAGNOSIS — I10: ICD-10-CM

## 2017-09-01 DIAGNOSIS — J96.01: ICD-10-CM

## 2017-09-01 DIAGNOSIS — H54.7: ICD-10-CM

## 2017-09-01 DIAGNOSIS — T81.82XA: ICD-10-CM

## 2017-09-01 DIAGNOSIS — K59.00: ICD-10-CM

## 2017-09-01 DIAGNOSIS — Z88.9: ICD-10-CM

## 2017-09-01 DIAGNOSIS — E03.9: ICD-10-CM

## 2017-09-01 DIAGNOSIS — Z79.82: ICD-10-CM

## 2017-09-01 DIAGNOSIS — D86.9: ICD-10-CM

## 2017-09-01 DIAGNOSIS — Z90.49: ICD-10-CM

## 2017-09-01 DIAGNOSIS — J44.9: ICD-10-CM

## 2017-09-01 PROCEDURE — 88305 TISSUE EXAM BY PATHOLOGIST: CPT

## 2017-09-01 PROCEDURE — 88341 IMHCHEM/IMCYTCHM EA ADD ANTB: CPT

## 2017-09-01 PROCEDURE — 87075 CULTR BACTERIA EXCEPT BLOOD: CPT

## 2017-09-01 PROCEDURE — 85025 COMPLETE CBC W/AUTO DIFF WBC: CPT

## 2017-09-01 PROCEDURE — 86901 BLOOD TYPING SEROLOGIC RH(D): CPT

## 2017-09-01 PROCEDURE — 87102 FUNGUS ISOLATION CULTURE: CPT

## 2017-09-01 PROCEDURE — 88342 IMHCHEM/IMCYTCHM 1ST ANTB: CPT

## 2017-09-01 PROCEDURE — 87206 SMEAR FLUORESCENT/ACID STAI: CPT

## 2017-09-01 PROCEDURE — 88307 TISSUE EXAM BY PATHOLOGIST: CPT

## 2017-09-01 PROCEDURE — 87116 MYCOBACTERIA CULTURE: CPT

## 2017-09-01 PROCEDURE — 86850 RBC ANTIBODY SCREEN: CPT

## 2017-09-01 PROCEDURE — 88313 SPECIAL STAINS GROUP 2: CPT

## 2017-09-01 PROCEDURE — 88312 SPECIAL STAINS GROUP 1: CPT

## 2017-09-01 PROCEDURE — 87205 SMEAR GRAM STAIN: CPT

## 2017-09-01 PROCEDURE — 80053 COMPREHEN METABOLIC PANEL: CPT

## 2017-09-01 PROCEDURE — 36620 INSERTION CATHETER ARTERY: CPT

## 2017-09-01 PROCEDURE — 94640 AIRWAY INHALATION TREATMENT: CPT

## 2017-09-01 PROCEDURE — 71020: CPT

## 2017-09-01 PROCEDURE — 84439 ASSAY OF FREE THYROXINE: CPT

## 2017-09-01 PROCEDURE — 83036 HEMOGLOBIN GLYCOSYLATED A1C: CPT

## 2017-09-01 PROCEDURE — 94760 N-INVAS EAR/PLS OXIMETRY 1: CPT

## 2017-09-01 PROCEDURE — 87070 CULTURE OTHR SPECIMN AEROBIC: CPT

## 2017-09-01 PROCEDURE — 71010: CPT

## 2017-09-01 PROCEDURE — 82164 ANGIOTENSIN I ENZYME TEST: CPT

## 2017-09-01 PROCEDURE — 84443 ASSAY THYROID STIM HORMONE: CPT

## 2017-09-01 PROCEDURE — 80048 BASIC METABOLIC PNL TOTAL CA: CPT

## 2017-09-01 PROCEDURE — 86900 BLOOD TYPING SEROLOGIC ABO: CPT

## 2017-09-07 LAB
BASOPHILS # BLD AUTO: 0 K/UL (ref 0–0.2)
BASOPHILS NFR BLD AUTO: 0 %
CH: 30.5
CHCM: 33.3
EOSINOPHIL # BLD AUTO: 0.1 K/UL (ref 0–0.7)
EOSINOPHIL NFR BLD AUTO: 1 %
ERYTHROCYTE [DISTWIDTH] IN BLOOD BY AUTOMATED COUNT: 4.03 M/UL (ref 4.3–5.9)
ERYTHROCYTE [DISTWIDTH] IN BLOOD: 13.7 % (ref 11.5–15.5)
GLUCOSE BLD-MCNC: 139 MG/DL (ref 75–99)
HCT VFR BLD AUTO: 37.1 % (ref 39–53)
HDW: 2.62
HGB BLD-MCNC: 12.3 GM/DL (ref 13–17.5)
LUC NFR BLD AUTO: 3 %
LYMPHOCYTES # SPEC AUTO: 1.1 K/UL (ref 1–4.8)
LYMPHOCYTES NFR SPEC AUTO: 9 %
MCH RBC QN AUTO: 30.6 PG (ref 25–35)
MCHC RBC AUTO-ENTMCNC: 33.2 G/DL (ref 31–37)
MCV RBC AUTO: 92.2 FL (ref 80–100)
MONOCYTES # BLD AUTO: 1 K/UL (ref 0–1)
MONOCYTES NFR BLD AUTO: 8 %
NEUTROPHILS # BLD AUTO: 9.4 K/UL (ref 1.3–7.7)
NEUTROPHILS NFR BLD AUTO: 79 %
WBC # BLD AUTO: 0.3 10*3/UL
WBC # BLD AUTO: 11.9 K/UL (ref 3.8–10.6)
WBC (PEROX): 12.39

## 2017-09-07 PROCEDURE — 07B74ZX EXCISION OF THORAX LYMPHATIC, PERCUTANEOUS ENDOSCOPIC APPROACH, DIAGNOSTIC: ICD-10-PCS

## 2017-09-07 PROCEDURE — 0BBG4ZX EXCISION OF LEFT UPPER LUNG LOBE, PERCUTANEOUS ENDOSCOPIC APPROACH, DIAGNOSTIC: ICD-10-PCS

## 2017-09-07 RX ADMIN — ACETAMINOPHEN SCH MLS/HR: 10 INJECTION, SOLUTION INTRAVENOUS at 20:27

## 2017-09-07 RX ADMIN — MORPHINE SULFATE PRN MG: 2 INJECTION, SOLUTION INTRAMUSCULAR; INTRAVENOUS at 22:44

## 2017-09-07 RX ADMIN — CARVEDILOL SCH MG: 12.5 TABLET, FILM COATED ORAL at 20:31

## 2017-09-07 RX ADMIN — HEPARIN SODIUM SCH: 5000 INJECTION, SOLUTION INTRAVENOUS; SUBCUTANEOUS at 22:38

## 2017-09-07 RX ADMIN — HEPARIN SODIUM SCH UNIT: 5000 INJECTION, SOLUTION INTRAVENOUS; SUBCUTANEOUS at 20:29

## 2017-09-07 RX ADMIN — PREDNISOLONE ACETATE SCH DROPS: 10 SUSPENSION/ DROPS OPHTHALMIC at 20:32

## 2017-09-07 RX ADMIN — ATORVASTATIN CALCIUM SCH MG: 20 TABLET, FILM COATED ORAL at 20:32

## 2017-09-07 RX ADMIN — MORPHINE SULFATE PRN MG: 2 INJECTION, SOLUTION INTRAMUSCULAR; INTRAVENOUS at 18:47

## 2017-09-07 RX ADMIN — ACETAMINOPHEN SCH MLS/HR: 10 INJECTION, SOLUTION INTRAVENOUS at 23:37

## 2017-09-07 RX ADMIN — IPRATROPIUM BROMIDE AND ALBUTEROL SULFATE SCH: .5; 3 SOLUTION RESPIRATORY (INHALATION) at 21:37

## 2017-09-07 NOTE — XR
EXAMINATION TYPE: XR chest 1V

 

DATE OF EXAM: 9/7/2017

 

COMPARISON: NONE

 

HISTORY: Chest tube placement

 

TECHNIQUE: Single frontal view of the chest is obtained.

 

FINDINGS:  Left-sided chest tube seen with no sizable pneumothorax. Chronic rib deformity seen and th
ere is subcutaneous emphysema. Bilateral areas of subsegmental consolidation noted and there is ather
osclerotic change of the aorta.

 

IMPRESSION:  

1. Chest tube seen in position with no sizable pneumothorax.

## 2017-09-07 NOTE — P.CNPUL
History of Present Illness


Consult date: 09/07/17


Requesting physician: Hector Gomez


Reason for consult: COPD (Status post thoracotomy and wedge resection of 

suspected lingular mass.)


Chief complaint: Abnormal CT of the chest showing left lung mass and 

mediastinal adenopathy


History of present illness: 


This is an 84-year-old white male who usually follows up with Dr. Glasgow, 

patient was recently found to have abnormal CT of the chest and abnormal PET 

scan.  He was referred to Dr. Gomez and he underwent left thoracoscopy biopsy 

of mediastinal node and wedge resection of mass from left lingula.  

Postoperatively, we were asked to see him on consultation.  Patient is known to 

have history of mild coronary artery disease, history of COPD, hypertension, 

hyperlipidemia, height both thyroidism, severe GERD, and he was recently 

admitted to the hospital with unstable angina symptoms and recurrent episodes 

of chest pain.  Recent cardiac catheterization showed same findings compared to 

3 years ago as mild coronary artery disease not requiring angioplasty or surgery

, and the recommendation was to continue medical therapy.





Patient was evaluated today after his thoracoscopy, seems to be generally weak 

and tired, in no form of respiratory distress.  Chest x-ray was reviewed, no 

evidence of active disease, left sided chest tube was noted.








Review of Systems





Constitutional: Chronic weakness fatigue, no fever no chills.





HEENT: Negative





Chest: As noted in history of present illness.





Cardiac: As noted in the history of the present illness patient is known to 

have mild coronary artery disease.





GI: No nausea vomiting abdominal pain melena or hematemesis.





Genitourinary: No dysuria frequency or urgency.





Musko skeletal: Vague aches and pains.





Neurologic: No headaches blurred vision or dizziness.





Hematologic: No clotting bleeding or bruising.





Past Medical History


Past Medical History: Hyperlipidemia, Hypertension, Thyroid Disorder


Additional Past Medical History / Comment(s): MACULAR DEGENERATION- POOR VISION.

, STATES IRREGULAR HEART BEAT.


History of Any Multi-Drug Resistant Organisms: None Reported


Past Surgical History: Appendectomy, Cholecystectomy, Heart Catheterization, 

Orthopedic Surgery


Additional Past Surgical History / Comment(s): Bifemoral bypass grafting (1980's

)


Past Anesthesia/Blood Transfusion Reactions: No Reported Reaction


Smoking Status: Current every day smoker


Past Alcohol Use History: None Reported





- Past Family History


  ** Mother


Family Medical History: CVA/TIA, Hypertension





  ** Father


Family Medical History: Cancer


Additional Family Medical History / Comment(s): CANCER OF LIP





Medications and Allergies


 Home Medications











 Medication  Instructions  Recorded  Confirmed  Type


 


Aspirin EC [Ecotrin] 325 mg PO DAILY 05/28/15 09/07/17 History


 


Isosorbide Mononitrate ER [Imdur] 30 mg PO DAILY 05/28/15 09/07/17 History


 


Omeprazole [PriLOSEC] 20 mg PO DAILY 05/28/15 09/07/17 History


 


Temazepam [Restoril] 15 mg PO HS PRN 05/28/15 09/07/17 History


 


Vits A,C,E/Lutein/Minerals 1 tab PO BID 05/28/15 09/07/17 History





[Ocuvite with Lutein Tablet]    


 


Fludrocortisone [Florinef] 0.05 mg PO DAILY 03/04/17 09/07/17 History


 


Levothyroxine Sodium [Synthroid] 100 mcg PO DAILY 03/04/17 09/07/17 History


 


Simvastatin [Zocor] 40 mg PO HS 03/04/17 09/07/17 History


 


amLODIPine [Norvasc] 10 mg PO DAILY #30 tab 03/05/17 09/07/17 Rx


 


Carvedilol [Coreg] 12.5 mg PO BID 08/29/17 09/07/17 History


 


Ipratropium/Albuterol Sulfate 1 puff INHALATION RT-QID 08/29/17 09/07/17 History





[Combivent Respimat Inhaler]    


 


prednisoLONE ACETATE 1% OPHTH 1 drops RIGHT EYE BID 08/29/17 09/07/17 History





[Pred Forte 1%]    











 Allergies











Allergy/AdvReac Type Severity Reaction Status Date / Time


 


povidone-iodine Allergy Unknown Red Skin Verified 09/07/17 15:53





[From Betadine]     


 


soap [From Betadine] Allergy Unknown Red Skin Verified 09/07/17 15:53














Physical Exam


Vitals: 


 Vital Signs











  Temp Pulse Pulse Resp BP Pulse Ox


 


 09/07/17 16:17   65   18  120/59  93 L


 


 09/07/17 16:00   69   20  137/60  93 L


 


 09/07/17 15:45   68   20  137/60  92 L


 


 09/07/17 15:32   70   18  116/54  96


 


 09/07/17 15:19  97.1 F L  78   14  143/66  100


 


 09/07/17 12:45  97.8 F   74  20  126/56  93 L


 


 09/07/17 12:08  97.8 F   74  20  126/56  93 L








 Intake and Output











 09/07/17 09/07/17 09/07/17





 06:59 14:59 22:59


 


Intake Total  800 100


 


Output Total  75 


 


Balance  725 100


 


Intake:   


 


  IV  800 100


 


Output:   


 


  Urine  50 


 


  Estimated Blood Loss  25 














Physical Exam: Revealed an 84-year-old white male in no distress,


HEENT:[Neck is supple.] [No neck masses.] [No thyromegaly.] [No JVD.]


Chest: Diminished breath sounds at the bases, left sided tube thoracostomy is 

noted.]


Cardiac Exam: [Normal S1 and S2, no S3 gallop, no murmur.]


Abdomen: [Soft, nontender,  no megaly, no rebound, no guarding, normal bowel 

sounds.]


Extremities: [No clubbing, no edema, no cyanosis.]


Neurological Exam: [No focal neurologic deficit.]





Results





- Laboratory Findings


Abnormal lab findings: 


 Abnormal Labs











  09/07/17





  12:31


 


POC Glucose (mg/dL)  139 H














- Diagnostic Findings


Chest x-ray: image reviewed (Chest tube noted to be in position, no evidence of 

pneumothorax.)





Assessment and Plan


Plan: 





Impression: Status post left thoracoscopy, biopsy of mediastinal node and wedge 

resection of mass from left lingula.  Postoperative day #0.





2 multiple comorbidities including history of coronary artery disease, 

hypertension, hypothyroidism, and hyperlipidemia.  History of suspected COPD 

considering his smoking history.





Recommendation: Continue incentive spirometry, bronchodilators, early ambulation

, await results of the wedge resection and mediastinal node biopsy.  We'll 

continue to follow.


Time with Patient: Greater than 30

## 2017-09-07 NOTE — XR
EXAMINATION TYPE: XR chest 1V

 

DATE OF EXAM: 9/7/2017

 

COMPARISON: 9/7/2017 3:47 PM

 

HISTORY: Postoperative left video assisted thoracoscopic lung biopsy

 

TECHNIQUE: Single frontal upright AP portable view.

 

FINDINGS:  Left chest tube remains in place, appearing to be satisfactory in position, and similar si
milar to its prior position. 

 

However, since the prior study the subcutaneous emphysema has increased considerably in the soft tiss
ues lateral to the left hemithorax.

 

There is no pneumothorax. No evidence of pleural effusion.

 

The right lung is clear and well-expanded. The left upper and mid lung is clear and well-expanded, th
e left lower lung zone cannot be well-visualized but appears predominantly clear.

 

Cardiomediastinal silhouette is remarkable for thoracic aorta tortuosity and ectasia. The skeletal st
ructures are unremarkable.

 

IMPRESSION:

Increased subcutaneous emphysema. No other changes.

## 2017-09-07 NOTE — P.OP
Date of Procedure: 09/07/17


Preoperative Diagnosis: 


Left lung mass, mediastinal adenopathy


Postoperative Diagnosis: 


Same


Procedure(s) Performed: 


Left thoracoscopy, biopsy of mediastinal lymph node, wedge resection of mass 

from left lingula


Implants: 





Anesthesia: PRAVINA


Surgeon: Hector Gomez


Assistant #1: Rusty Kirby


Estimated Blood Loss (ml): 25


IV fluids (ml): 400


Urine output (ml): 50


Pathology: other (Mediastinal lymph node for pathology and culture, wedge 

resection mass lingula for permanent section)


Condition: stable


Disposition: PACU


Indications for Procedure: 


Mass in lung and mediastinal adenopathy


Operative Findings: 


There were marketed adhesions present in the chest especially near the apex and 

posteriorly these were taken down with electrocautery and order to mobilize the 

upper lobe of the lung and expose the mediastinal lymph nodes.  Mediastinal 

lymph node was firm mass in the L5 region.  There was a 1.5 cm mass present in 

the anterior portion of the lingula with retraction of the overlying pleura.  

This was wedged out with grossly negative margins.


Description of Procedure: 


The patient was brought to the operating room and placed supine on the 

operating table.  Gen. anesthesia was induced and he was intubated with a double

-lumen endotracheal tube.  Endotracheal tube was placed with fiberoptic 

bronchoscopic guidance.  Tube was secured and the patient turned in the right 

lateral decubitus position.  Left chest was sterilely prepped and draped.  3 one

-inch incisions were made in the left chest and the video thoracoscope was 

introduced.  There were marketed adhesions present in the upper portion of the 

upper lobe extending extensively posteriorly.  These were taken down with 

electrocautery in order to expose the L5 region.  Upon reaching the L5 region 

we can see a large lymph node under the pleura.  The pleura was opened and the 

node was dissected out with blunt dissection.  It was removed from the 

mediastinum and brought out onto the field.  It was section on the back table 

and a portion sent for culture and the remaining remainder for pathology.  We 

now identified the nodule in the lingula.  It was wedged out with multiple 

firings of the Endo AMOS medium thick stapler.  Specimen was placed in an Endo 

Catch bag and brought out onto the field.  It was sent for permanent section.  

The lung was inflated and checked for air leaks.  None were seen.  28 chest 

tubes was placed through separate stab incision and positioned posterior 

apically.  It was secured to the skin with an 0 Ethibond suture.  Rib blocks 

were performed at the relations of the incision with half percent Marcaine.  

Incisions were then closed with layers of Vicryl suture.  There were dressed 

with skin glue and the patient was turned supine and extubated and transferred 

to recovery in stable condition.

## 2017-09-08 LAB
ANION GAP SERPL CALC-SCNC: 7 MMOL/L
BASOPHILS # BLD AUTO: 0 K/UL (ref 0–0.2)
BASOPHILS NFR BLD AUTO: 0 %
BUN SERPL-SCNC: 17 MG/DL (ref 9–20)
CALCIUM SPEC-MCNC: 8.4 MG/DL (ref 8.4–10.2)
CH: 30.3
CHCM: 33.1
CHLORIDE SERPL-SCNC: 104 MMOL/L (ref 98–107)
CO2 SERPL-SCNC: 26 MMOL/L (ref 22–30)
EOSINOPHIL # BLD AUTO: 0.1 K/UL (ref 0–0.7)
EOSINOPHIL NFR BLD AUTO: 2 %
ERYTHROCYTE [DISTWIDTH] IN BLOOD BY AUTOMATED COUNT: 3.73 M/UL (ref 4.3–5.9)
ERYTHROCYTE [DISTWIDTH] IN BLOOD: 13.7 % (ref 11.5–15.5)
GLUCOSE SERPL-MCNC: 172 MG/DL (ref 74–99)
HCT VFR BLD AUTO: 34.4 % (ref 39–53)
HDW: 2.57
HGB BLD-MCNC: 11.5 GM/DL (ref 13–17.5)
LUC NFR BLD AUTO: 3 %
LYMPHOCYTES # SPEC AUTO: 0.8 K/UL (ref 1–4.8)
LYMPHOCYTES NFR SPEC AUTO: 12 %
MCH RBC QN AUTO: 30.8 PG (ref 25–35)
MCHC RBC AUTO-ENTMCNC: 33.5 G/DL (ref 31–37)
MCV RBC AUTO: 92.1 FL (ref 80–100)
MONOCYTES # BLD AUTO: 0.5 K/UL (ref 0–1)
MONOCYTES NFR BLD AUTO: 8 %
NEUTROPHILS # BLD AUTO: 5.4 K/UL (ref 1.3–7.7)
NEUTROPHILS NFR BLD AUTO: 76 %
NON-AFRICAN AMERICAN GFR(MDRD): >60
POTASSIUM SERPL-SCNC: 4.3 MMOL/L (ref 3.5–5.1)
SODIUM SERPL-SCNC: 137 MMOL/L (ref 137–145)
WBC # BLD AUTO: 0.19 10*3/UL
WBC # BLD AUTO: 7.2 K/UL (ref 3.8–10.6)
WBC (PEROX): 7.44

## 2017-09-08 RX ADMIN — HEPARIN SODIUM SCH UNIT: 5000 INJECTION, SOLUTION INTRAVENOUS; SUBCUTANEOUS at 16:53

## 2017-09-08 RX ADMIN — LEVOTHYROXINE SODIUM SCH MCG: 100 TABLET ORAL at 06:21

## 2017-09-08 RX ADMIN — HEPARIN SODIUM SCH UNIT: 5000 INJECTION, SOLUTION INTRAVENOUS; SUBCUTANEOUS at 08:55

## 2017-09-08 RX ADMIN — IPRATROPIUM BROMIDE AND ALBUTEROL SULFATE SCH ML: .5; 3 SOLUTION RESPIRATORY (INHALATION) at 15:25

## 2017-09-08 RX ADMIN — CARVEDILOL SCH MG: 12.5 TABLET, FILM COATED ORAL at 16:53

## 2017-09-08 RX ADMIN — ACETAMINOPHEN SCH MLS/HR: 10 INJECTION, SOLUTION INTRAVENOUS at 06:22

## 2017-09-08 RX ADMIN — PREDNISOLONE ACETATE SCH: 10 SUSPENSION/ DROPS OPHTHALMIC at 16:29

## 2017-09-08 RX ADMIN — ISOSORBIDE MONONITRATE SCH MG: 30 TABLET, EXTENDED RELEASE ORAL at 08:56

## 2017-09-08 RX ADMIN — IPRATROPIUM BROMIDE AND ALBUTEROL SULFATE SCH ML: .5; 3 SOLUTION RESPIRATORY (INHALATION) at 20:09

## 2017-09-08 RX ADMIN — ACETAMINOPHEN SCH MLS/HR: 10 INJECTION, SOLUTION INTRAVENOUS at 16:42

## 2017-09-08 RX ADMIN — MORPHINE SULFATE PRN MG: 2 INJECTION, SOLUTION INTRAMUSCULAR; INTRAVENOUS at 06:28

## 2017-09-08 RX ADMIN — HEPARIN SODIUM SCH UNIT: 5000 INJECTION, SOLUTION INTRAVENOUS; SUBCUTANEOUS at 23:30

## 2017-09-08 RX ADMIN — ASPIRIN 325 MG ORAL TABLET SCH MG: 325 PILL ORAL at 08:56

## 2017-09-08 RX ADMIN — IPRATROPIUM BROMIDE AND ALBUTEROL SULFATE SCH: .5; 3 SOLUTION RESPIRATORY (INHALATION) at 09:37

## 2017-09-08 RX ADMIN — PANTOPRAZOLE SODIUM SCH MG: 40 TABLET, DELAYED RELEASE ORAL at 06:21

## 2017-09-08 RX ADMIN — PREDNISOLONE ACETATE SCH DROPS: 10 SUSPENSION/ DROPS OPHTHALMIC at 20:10

## 2017-09-08 RX ADMIN — IPRATROPIUM BROMIDE AND ALBUTEROL SULFATE SCH ML: .5; 3 SOLUTION RESPIRATORY (INHALATION) at 13:38

## 2017-09-08 RX ADMIN — FLUDROCORTISONE ACETATE SCH MG: 0.1 TABLET ORAL at 08:56

## 2017-09-08 RX ADMIN — MORPHINE SULFATE PRN MG: 2 INJECTION, SOLUTION INTRAMUSCULAR; INTRAVENOUS at 00:46

## 2017-09-08 RX ADMIN — TAMSULOSIN HYDROCHLORIDE SCH MG: 0.4 CAPSULE ORAL at 20:09

## 2017-09-08 RX ADMIN — MORPHINE SULFATE PRN MG: 2 INJECTION, SOLUTION INTRAMUSCULAR; INTRAVENOUS at 03:25

## 2017-09-08 RX ADMIN — CARVEDILOL SCH MG: 12.5 TABLET, FILM COATED ORAL at 06:21

## 2017-09-08 RX ADMIN — ATORVASTATIN CALCIUM SCH MG: 20 TABLET, FILM COATED ORAL at 20:09

## 2017-09-08 RX ADMIN — PREDNISOLONE ACETATE SCH DROPS: 10 SUSPENSION/ DROPS OPHTHALMIC at 08:57

## 2017-09-08 NOTE — P.CONS
History of Present Illness





- Reason for Consult


Consult date: 09/08/17


Medical management





- History of Present Illness


This is a pleasant 84-year-old  gentleman patient of Dr. Weber, Dr. Glasgow underlying history of COPD hypertension hyperlipidemia hyperglycemia BPH 

and mild CAD based on heart cath in 2014 requiring on the medical management, 

was noted to have a spot in the left lung based on an abnormal CT of the chest 

and abnormal PET/CT ,


On 9/72017 Patient underwent left thoracoscopy, biopsy of mediastinal lymph 

wedge resection of mass from left lingula, workup done in the past few weeks 

include a PET/CT performed 07/29/2017 showing hypermetabolic uptake in the left 

upper lobe nodule 12 mm pleural-based in the anterior mediastinum.  Patient 

currently has strep throat ostomy tube, and developed subcutaneous emphysema 

overnight, with upper chest swelling swollen face and swollen right eyelid.  

Patient currently stable, no active problems at this time no shortness of 

breath no chest pain no  or GI complaints





Review of Systems


Constitutional: Reports as per HPI, Denies anorexia, Denies chills, Denies 

chronic headaches, Denies chronic pain, Denies daytime sleepiness, Denies 

fatigue, Denies fever, Denies lethargy, Denies malaise, Denies night sweats, 

Denies poor appetite, Denies sweats, Denies weakness, Denies weight gain, 

Denies weight loss


Ears, nose, mouth and throat: Reports as per HPI, Denies ant. neck pain, Denies 

bleeding gums, Denies dental pain, Denies dysphagia, Denies epistaxis, Denies 

headache, Denies hoarseness, Denies mouth pain, Denies nasal congestion, Denies 

nasal discharge, Denies neck fullness/pressure, Denies neck lump, Denies nose 

pain, Denies odynophagia, Denies post-nasal drip, Denies sinus pain, Denies 

sinus pressure, Denies swelling in mouth, Denies swelling in throat, Denies 

sore throat, Denies vertigo, Denies voice changes


Cardiovascular: Reports as per HPI, Denies chest pain, Denies claudication, 

Denies decreased exercise tolerance, Denies dyspnea on exertion, Denies edema, 

Denies high blood pressure, Denies irregular heart beat, Denies leg edema, 

Denies lightheadedness, Denies orthopnea, Denies palpitations, Denies 

paroxysmal nocturnal dyspnea, Denies phlebitis, Denies rapid heart beat, Denies 

shortness of breath, Denies syncope


Respiratory: Reports as per HPI, Denies congestion, Denies cough, Denies cough 

with sputum, Denies dyspnea, Denies excessive sputum, Denies hemoptysis, Denies 

home oxygen, Denies pain, Denies pain on inspiration, Denies pleurisy, Denies 

respiratory infections, Denies sleep apnea, Denies snoring, Denies wheezing


Gastrointestinal: Reports as per HPI, Denies abdominal pain, Denies belching, 

Denies bloating, Denies BRBPR, Denies change in bowel habits, Denies coffee 

ground emesis, Denies constipation, Denies diarrhea, Denies dyspepsia, Denies 

early satiety, Denies excessive gas, Denies heartburn, Denies hematemesis, 

Denies hematochezia, Denies indigestion, Denies jaundice, Denies lactose 

intolerance, Denies loss of appetite, Denies melena, Denies nausea, Denies 

vomiting


Genitourinary: Reports as per HPI, Denies decreased libido, Denies difficulties 

fathering child, Denies discharge, Denies dysuria, Denies erectile dysfunction, 

Denies flank pain, Denies genital pain, Denies genital sores, Denies hematuria, 

Denies impotence, Denies incontinence, Denies kidney stones, Denies nocturia, 

Denies polyuria, Denies testicular lump, Denies testicular pain, Denies urinary 

frequency, Denies urinary hesitancy, Denies urinary retention


Musculoskeletal: Reports as per HPI, Denies arm numbness/tingling, Denies 

atrophy, Denies fractures, Denies frequent falls, Denies gait dysfunction, 

Denies hot joints, Denies leg numbness/tingling, Denies limitation of motion, 

Denies loss of height, Denies low back pain, Denies morning stiffness, Denies 

muscle cramps, Denies muscle weakness, Denies myalgias, Denies neck pain, 

Denies neck stiffness, Denies prior amputations, Denies redness of joints, 

Denies shooting arm pain, Denies shooting leg pain


Integumentary: Reports as per HPI, Denies acne, Denies boils, Denies brittle 

nails, Denies change in hair/nails, Denies color changes, Denies darkening of 

skin, Denies depigmentation, Denies dryness, Denies foot/leg ulcers, Denies 

growths, Denies hirsutism, Denies lesions, Denies onychomycosis, Denies pruritus

, Denies rash, Denies sores, Denies striae, Denies unusual bruising, Denies 

wounds


Neurological: Reports as per HPI, Denies aphasia, Denies ataxia, Denies balance 

difficulties, Denies burning pain, Denies change in mentation, Denies change in 

smell/taste, Denies change in speech, Denies confusion, Denies convulsions, 

Denies double vision, Denies gait dysfunction, Denies head injury, Denies 

headaches, Denies hearing difficulties, Denies lack of coordination, Denies 

loss of vision, Denies memory loss, Denies migraines, Denies motor disturbance, 

Denies numbness, Denies paralysis, Denies paresthesias, Denies seizures, Denies 

sensory deficit, Denies spasticity, Denies syncope, Denies tic, Denies tingling

, Denies transient paralysis, Denies tremors, Denies vertigo, Denies weakness, 

Denies visual changes


Psychiatric: Reports as per HPI, Denies anhedonia, Denies anxiety, Denies 

anxiety attacks, Denies change in appetite, Denies change in libido, Denies 

change in sleep habits, Denies confusion, Denies depression, Denies difficulty 

concentrating, Denies disorientation, Denies hallucinations, Denies hopelessness

, Denies hypersomnia, Denies insomnia, Denies irritability, Denies memory loss, 

Denies mood swings, Denies paranoia, Denies sadness/tearfulness, Denies sleep 

disturbances, Denies suicidal ideation


Endocrine: Reports as per HPI, Denies cold intolerance, Denies deepening of the 

voice, Denies excessive sweating, Denies excessive thirst, Denies fatigue, 

Denies flushing, Denies heat intolerance, Denies high blood sugars, Denies 

increase in ring/shoe/hat size, Denies low blood sugars, Denies nocturia, 

Denies palpitations, Denies polydipsia, Denies polyphagia, Denies polyuria, 

Denies proptosis, Denies recent glucocorticoid use, Denies thyroid mass, Denies 

weight change


Hematologic/Lymphatic: Reports as per HPI, Denies easy bleeding, Denies easy 

bruising, Denies lymphadenopathy, Denies lymphedema, Denies thrombophilia


Allergic/Immunologic: Reports as per HPI, Denies allergic rhinitis, Denies 

anaphylaxis, Denies angioedema, Denies gluten intolerance, Denies persistent 

infections, Denies seasonal allergies, Denies urticaria, Denies wheezing





Past Medical History


Past Medical History: Hyperlipidemia, Hypertension, Thyroid Disorder


Additional Past Medical History / Comment(s): MACULAR DEGENERATION- POOR VISION.

, STATES IRREGULAR HEART BEAT.


History of Any Multi-Drug Resistant Organisms: None Reported


Past Surgical History: Appendectomy, Cholecystectomy, Heart Catheterization, 

Orthopedic Surgery


Additional Past Surgical History / Comment(s): Bifemoral bypass grafting (1980's

)


Past Anesthesia/Blood Transfusion Reactions: No Reported Reaction


Smoking Status: Current every day smoker


Past Alcohol Use History: None Reported





- Past Family History


  ** Mother


Family Medical History: CVA/TIA, Hypertension





  ** Father


Family Medical History: Cancer


Additional Family Medical History / Comment(s): CANCER OF LIP





Medications and Allergies


 Home Medications











 Medication  Instructions  Recorded  Confirmed  Type


 


Aspirin EC [Ecotrin] 325 mg PO DAILY 05/28/15 09/07/17 History


 


Isosorbide Mononitrate ER [Imdur] 30 mg PO DAILY 05/28/15 09/07/17 History


 


Omeprazole [PriLOSEC] 20 mg PO DAILY 05/28/15 09/07/17 History


 


Temazepam [Restoril] 15 mg PO HS PRN 05/28/15 09/07/17 History


 


Vits A,C,E/Lutein/Minerals 1 tab PO BID 05/28/15 09/07/17 History





[Ocuvite with Lutein Tablet]    


 


Fludrocortisone [Florinef] 0.05 mg PO DAILY 03/04/17 09/07/17 History


 


Levothyroxine Sodium [Synthroid] 100 mcg PO DAILY 03/04/17 09/07/17 History


 


Simvastatin [Zocor] 40 mg PO HS 03/04/17 09/07/17 History


 


amLODIPine [Norvasc] 10 mg PO DAILY #30 tab 03/05/17 09/07/17 Rx


 


Carvedilol [Coreg] 12.5 mg PO BID 08/29/17 09/07/17 History


 


Ipratropium/Albuterol Sulfate 1 puff INHALATION RT-QID 08/29/17 09/07/17 History





[Combivent Respimat Inhaler]    


 


prednisoLONE ACETATE 1% OPHTH 1 drops RIGHT EYE BID 08/29/17 09/07/17 History





[Pred Forte 1%]    











 Allergies











Allergy/AdvReac Type Severity Reaction Status Date / Time


 


povidone-iodine Allergy Unknown Red Skin Verified 09/07/17 15:53





[From Betadine]     


 


soap [From Betadine] Allergy Unknown Red Skin Verified 09/07/17 15:53














Physical Exam


Vitals: 


 Vital Signs











  Temp Pulse Pulse Resp BP Pulse Ox


 


 09/08/17 08:00  97.9 F   77  20  132/63  93 L


 


 09/08/17 03:56  97 F L   80  16  138/63  92 L


 


 09/07/17 23:53  96.3 F L   72  16  114/56  94 L


 


 09/07/17 20:00  96.8 F L   79  16  129/60  94 L


 


 09/07/17 18:45    75   123/60  91 L


 


 09/07/17 18:15    76   112/56  92 L


 


 09/07/17 17:45    80   126/56  90 L


 


 09/07/17 17:15    76   121/59  90 L


 


 09/07/17 17:00    72  22  147/60  90 L


 


 09/07/17 16:50  96.6 F L   71  20  127/59  96


 


 09/07/17 16:17   65   18  120/59  93 L


 


 09/07/17 16:00   69   20  137/60  93 L


 


 09/07/17 15:45   68   20  137/60  92 L


 


 09/07/17 15:32   70   18  116/54  96


 


 09/07/17 15:19  97.1 F L  78   14  143/66  100


 


 09/07/17 12:45  97.8 F   74  20  126/56  93 L


 


 09/07/17 12:08  97.8 F   74  20  126/56  93 L








 Intake and Output











 09/07/17 09/08/17 09/08/17





 22:59 06:59 14:59


 


Intake Total 1100 1100 350


 


Output Total 110 563 9


 


Balance 990 537 341


 


Intake:   


 


  IV 1100 1100 


 


    ACETAMINOPHEN IV (For  400 





    ) 1,000 mg In Empty Bag 1   





    bag @ 400 mls/hr IVPB   





    Q6HR PIPPA Rx#:[id number]   


 


    Dextrose 5%-0.45% NaCl 1, 600 600 





    000 ml @ 45 mls/hr IV .   





    [id number] PIPPA Rx#:[id number]   


 


    ceFAZolin 2 gm In Sodium  100 





    Chloride 0.9% 100 ml @   





    100 mls/hr IVPB ONCE ONE   





    Rx#:[id number]   


 


  Oral   350


 


Output:   


 


  Chest Tube Drainage 2 13 9


 


    Chest Tube Left Lateral 2 13 9





    Chest   


 


  Drainage 58  


 


    Left Chest 58  


 


  Urine 50 550 


 


    Straight  550 


 


Other:   


 


  Voiding Method Urinal  Urinal


 


  Weight  83.9 kg 














Results


CBC & Chem 7: 


 09/08/17 06:10





 09/08/17 06:10


Labs: 


 Abnormal Lab Results - Last 24 Hours (Table)











  09/07/17 09/07/17 09/08/17 Range/Units





  12:31 19:21 06:10 


 


WBC   11.9 H   (3.8-10.6)  k/uL


 


RBC   4.03 L  3.73 L  (4.30-5.90)  m/uL


 


Hgb   12.3 L  11.5 L  (13.0-17.5)  gm/dL


 


Hct   37.1 L  34.4 L  (39.0-53.0)  %


 


Neutrophils #   9.4 H   (1.3-7.7)  k/uL


 


Lymphocytes #    0.8 L  (1.0-4.8)  k/uL


 


Glucose     (74-99)  mg/dL


 


POC Glucose (mg/dL)  139 H    (75-99)  mg/dL














  09/08/17 Range/Units





  06:10 


 


WBC   (3.8-10.6)  k/uL


 


RBC   (4.30-5.90)  m/uL


 


Hgb   (13.0-17.5)  gm/dL


 


Hct   (39.0-53.0)  %


 


Neutrophils #   (1.3-7.7)  k/uL


 


Lymphocytes #   (1.0-4.8)  k/uL


 


Glucose  172 H  (74-99)  mg/dL


 


POC Glucose (mg/dL)   (75-99)  mg/dL








 Microbiology - Last 24 Hours (Table)











 09/07/17 15:07 Gram Stain - Preliminary





 Lung - Left Tissue Culture - Preliminary


 


 09/07/17 15:07 Acid Fast Bacilli Culture - Preliminary





 Lung - Left 


 


 09/07/17 15:07 Anaerobic Culture - Preliminary





 Lung - Left 


 


 09/07/17 15:07 Fungal Culture - Preliminary





 Lung - Left 














Assessment and Plan


Plan: 








1  status post thoracotomy and wedge resection of lingular mass postop day #1.  

Procedure performed 09/07/2017 pathology sent, . followed by Dr. Gomez and 

pulmonary medicine





2 subcutaneous emphysema with facial bloating and right eyelid swelling. 

monitor for resolution and progression. currently has left chest tube





3 CAD: Known to have coronary disease from previous heart cath with medical 

management, stable asymptomatic on Imdur 30 daily, Coreg, Lipitor, Norvasc asa 

325 daily





4 COPD: No Current symptoms Continue patient on his current inhaler.





5 hypertension: Remain on carvedilol 12.5 mg g twice a day.  Amlodipine 10 mg 

daily Imdur 30 mg daily





6 hyperlipidemia: On atorvastatin 20 mg daily.





7 hypothyroidism: Remain on Synthroid 100 g daily.





8 severe GERD: Will do omeprazole 20 mg twice a day.





9 DVT prophylaxis: Patient will be on heparin drip for now and switch to 

heparin subcutaneous and knee-high GIL hose.





10.  Chronic supplementation with Florinef 0.05 mg daily possibly for autonomic 

dysfunction rather than adrenal insufficiency





12.  Mild anemia possibly blood loss related, monitor for decline





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





Impaired tandem blood sugars, hemoglobin A1c will be obtained





Transient hypoxemic respiratory failure with hypoxemia, currently on 2-3 L 

nasal cannula, patient to continue on incentive spirometry





GI prophylaxis Protonix





DVT prophylaxis with subcutaneous heparin





Thank you Dr. Gomez in allowing us to participate in the care.  Patient.  We'

ll going to follow him with you during his current hospitalization with 

recommendations to his treatment based on his clinical progress.  This feel 

free to contact us should there be any concerns  and we would gladly be of 

assistance





CODE STATUS: Full code.

## 2017-09-08 NOTE — P.PN
Subjective


Principal diagnosis: 





Left lung mass, mediastinal adenopathy.  Hypertension, hyperlipidemia, 

hypothyroid, peripheral vascular disease with history of bifemoral bypass in 

the 1980s, history of mild coronary artery disease, chronic obstructive 

pulmonary disease, and severe gastroesophageal reflux disease.





POD #1, Left thoracoscopy, biopsy of mediastinal lymph node, wedge resection of 

mass from left lingula





Patient is lying in bed with his eyes open, he is alert and orientated 3.  He 

reports that his pain is well controlled at this time and currently rates his 

pain 2-3 out of 10 on the pain scale.  No acute distress.  There is some 

subcutaneous emphysema to his anterior chest and back, neck and right face.  He 

reports that he has some urinary retention early this morning and was straight 

cathed for 500 mL of clear yellow urine.





Objective





- Vital Signs


Vital signs: 


 Vital Signs











Temp  97.9 F   09/08/17 08:00


 


Pulse  77   09/08/17 08:00


 


Resp  20   09/08/17 08:00


 


BP  132/63   09/08/17 08:00


 


Pulse Ox  93 L  09/08/17 08:00








 Intake & Output











 09/07/17 09/08/17 09/08/17





 18:59 06:59 18:59


 


Intake Total 900 2100 350


 


Output Total 125 623 9


 


Balance 775 1477 341


 


Weight  83.9 kg 


 


Intake:   


 


   2100 


 


    ACETAMINOPHEN IV (For NPO  800 





    ) 1,000 mg In Empty Bag 1   





    bag @ 400 mls/hr IVPB   





    Q6HR PIPPA Rx#:[id number]   


 


    Dextrose 5%-0.45% NaCl 1,  1200 





    000 ml @ 45 mls/hr IV .   





    [id number] PIPPA Rx#:[id number]   


 


    ceFAZolin 2 gm In Sodium  100 





    Chloride 0.9% 100 ml @   





    100 mls/hr IVPB ONCE ONE   





    Rx#:[id number]   


 


  Oral   350


 


Output:   


 


  Chest Tube Drainage  15 9


 


    Chest Tube Left Lateral  15 9





    Chest   


 


  Drainage  58 


 


    Left Chest  58 


 


  Urine 100 550 


 


    Straight  550 


 


  Estimated Blood Loss 25  


 


Other:   


 


  Voiding Method  Urinal Urinal














- Constitutional


General appearance: Present: cooperative, no acute distress





- EENT


Eyes: Present: PERRLA, normal appearance


ENT: Present: hearing grossly normal





- Neck


Details: 





Positive for subcutaneous emphysema.





- Respiratory


Details: 





Lung sounds are essentially clear throughout, diminished to his bilateral bases 

left greater than right.  Positive subcutaneous emphysema to his chest and 

back.  Left pleural chest tube remained intact and on waterseal at this time.  

Draining thin serosanguineous drainage.  80 mL output since surgery. He is 

achieving 1000 mL on his incentive spirometry.  Oxygen saturations are 92% on 3 

L nasal cannula.





- Cardiovascular


Details: 





Regular rhythm and rate.  S1 and S2 present, negative for S3, gallop or murmur.

  No edema present.  Remote telemetry showing normal sinus rhythm heart rate 

77.  Knee-high GIL hose and sequential compression devices in place to his 

bilateral lower extremities.





- Gastrointestinal


Gastrointestinal Comment(s): 





Abdomen is soft, nontender and nondistended.  Active bowel sounds all 4 

abdominal quadrants.  He is tolerating oral intake.





- Genitourinary


Genitourinary Comment(s): 





Adequate urine output, he was straight cathed this morning for 500 mL of clear 

yellow urine.





- Integumentary


Integumentary Comment(s): 





Left chest incisions clean dry and well approximated.  Chest tube insertion 

site with scant serosanguineous drainage.  Dressings clean, dry and intact.


Integumentary: Present: normal, normal turgor





- Neurologic


Neurologic Comment(s): 





No focal deficits.


Neurologic: Present: CNII-XII intact





- Musculoskeletal


Musculoskeletal: Present: strength equal bilaterally





- Psychiatric


Psychiatric: Present: A&O x's 3, appropriate affect, intact judgment & insight





- Allied health notes


Allied health notes reviewed: nursing





- Labs


CBC & Chem 7: 


 09/08/17 06:10





 09/08/17 06:10


Labs: 


 Abnormal Lab Results - Last 24 Hours (Table)











  09/07/17 09/07/17 09/08/17 Range/Units





  12:31 19:21 06:10 


 


WBC   11.9 H   (3.8-10.6)  k/uL


 


RBC   4.03 L  3.73 L  (4.30-5.90)  m/uL


 


Hgb   12.3 L  11.5 L  (13.0-17.5)  gm/dL


 


Hct   37.1 L  34.4 L  (39.0-53.0)  %


 


Neutrophils #   9.4 H   (1.3-7.7)  k/uL


 


Lymphocytes #    0.8 L  (1.0-4.8)  k/uL


 


Glucose     (74-99)  mg/dL


 


POC Glucose (mg/dL)  139 H    (75-99)  mg/dL














  09/08/17 Range/Units





  06:10 


 


WBC   (3.8-10.6)  k/uL


 


RBC   (4.30-5.90)  m/uL


 


Hgb   (13.0-17.5)  gm/dL


 


Hct   (39.0-53.0)  %


 


Neutrophils #   (1.3-7.7)  k/uL


 


Lymphocytes #   (1.0-4.8)  k/uL


 


Glucose  172 H  (74-99)  mg/dL


 


POC Glucose (mg/dL)   (75-99)  mg/dL








 Microbiology - Last 24 Hours (Table)











 09/07/17 15:07 Gram Stain - Preliminary





 Lung - Left Tissue Culture - Preliminary


 


 09/07/17 15:07 Acid Fast Bacilli Culture - Preliminary





 Lung - Left 


 


 09/07/17 15:07 Anaerobic Culture - Preliminary





 Lung - Left 


 


 09/07/17 15:07 Fungal Culture - Preliminary





 Lung - Left 














- Imaging and Cardiology


Chest x-ray: report reviewed, image reviewed





Assessment and Plan


(1) Hypertension


Status: Acute   





(2) Hyperlipidemia


Status: Acute   





(3) Mass of left lung


Status: Acute   





(4) Mediastinal lymphadenopathy


Status: Acute   





(5) COPD (chronic obstructive pulmonary disease)


Status: Acute   





(6) History of coronary artery disease


Status: Acute   





(7) GERD (gastroesophageal reflux disease)


Status: Acute   





(8) Hypothyroid


Status: Acute   


Plan: 





1.  His left pleural chest tube will be placed back to low continuous wall 

suction at -20 cm of H2O


2.  Monitor daily chest x-ray.


3.  Increase activity as tolerated.


4.  Encourage use of his incentive spirometry every hour while awake.


5.  Pulmonary management per Dr. Garcia's recommendations.


6.  DVT and GI prophylaxis in place.


7.  Further recommendations as patient progresses.


Time with Patient: Greater than 30

## 2017-09-08 NOTE — XR
EXAMINATION TYPE: XR chest 1V

 

DATE OF EXAM: 9/8/2017

 

COMPARISON: Prior chest x-ray 9/7/2017

 

HISTORY: Chest tube, postop

 

TECHNIQUE: Single frontal view of the chest is obtained.

 

FINDINGS:  The sided chest tube remains in place with distal tip at the apex of the left hemithorax. 
There is extensive subcutaneous emphysema which is progressed and extended bilaterally. There are ove
rlying cardiac leads and tubing. No evident pneumothorax or pleural effusion. Cardiomediastinal silho
uette, pulmonary vascularity and willis are stable. Minimal patchy density persists at the left lung ba
se.

 

IMPRESSION:  Stable chest tube placement. Progression of subcutaneous emphysema. No other significant
 interval change.

## 2017-09-08 NOTE — P.PN
Subjective


Principal diagnosis: 


Left lung mass, mediastinal adenopathy.  Hypertension, hyperlipidemia, 

hypothyroid, peripheral vascular disease with history of bifemoral bypass in 

the 1980s, history of mild coronary artery disease, chronic obstructive 

pulmonary disease, and severe gastroesophageal reflux disease.








Patient is postoperative day #1, status post left thoracoscopy biopsy of 

mediastinal nodes and wedge resection of mass from lingula.  Patient is doing 

well, however he developed significant subcutaneous emphysema mostly because 

for some reason the chest tube was taken off suction and placed on waterseal.  

No evidence of pneumothorax, but there is evidence of significant subcu 

emphysema on the chest x-ray today.  Patient denies any shortness of breath, 

but seems to be quite bothered with the puffiness and swelling in the chest 

wall and in the face.  Seems to be extending to the right orbital area.








Objective





- Vital Signs


Vital signs: 


 Vital Signs











Temp  96.8 F L  09/08/17 12:00


 


Pulse  86   09/08/17 13:49


 


Resp  18   09/08/17 12:00


 


BP  137/65   09/08/17 12:00


 


Pulse Ox  92 L  09/08/17 12:00








 Intake & Output











 09/07/17 09/08/17 09/08/17





 18:59 06:59 18:59


 


Intake Total 900 2100 350


 


Output Total 125 623 18


 


Balance 775 1477 332


 


Weight  83.9 kg 


 


Intake:   


 


   2100 


 


    ACETAMINOPHEN IV (For NPO  800 





    ) 1,000 mg In Empty Bag 1   





    bag @ 400 mls/hr IVPB   





    Q6HR PIPPA Rx#:[id number]   


 


    Dextrose 5%-0.45% NaCl 1,  1200 





    000 ml @ 45 mls/hr IV .   





    [id number] [facility] Rx#:[id number]   


 


    ceFAZolin 2 gm In Sodium  100 





    Chloride 0.9% 100 ml @   





    100 mls/hr IVPB ONCE ONE   





    Rx#:[id number]   


 


  Oral   350


 


Output:   


 


  Chest Tube Drainage  15 18


 


    Chest Tube Left Lateral  15 18





    Chest   


 


  Drainage  58 


 


    Left Chest  58 


 


  Urine 100 550 


 


    Straight  550 


 


  Estimated Blood Loss 25  


 


Other:   


 


  Voiding Method  Urinal Urinal














- Exam





Physical Exam: Revealed an 84-year-old white male in no distress, significant 

subcutaneous emphysema noted in the face and in the anterior chest wall area.


HEENT:[Neck is supple.] [No neck masses.] [No thyromegaly.] [No JVD.]


Chest: Diminished breath sounds at the bases, left sided tube thoracostomy is 

noted.]  Upper cutaneous emphysema noted in the right and left chest wall 

anteriorly.


Cardiac Exam: [Normal S1 and S2, no S3 gallop, no murmur.]


Abdomen: [Soft, nontender,  no megaly, no rebound, no guarding, normal bowel 

sounds.]


Extremities: [No clubbing, no edema, no cyanosis.]


Neurological Exam: [No focal neurologic deficit.]








- Labs


CBC & Chem 7: 


 09/08/17 06:10





 09/08/17 06:10


Labs: 


 Abnormal Lab Results - Last 24 Hours (Table)











  09/07/17 09/08/17 09/08/17 Range/Units





  19:21 06:10 06:10 


 


WBC  11.9 H    (3.8-10.6)  k/uL


 


RBC  4.03 L  3.73 L   (4.30-5.90)  m/uL


 


Hgb  12.3 L  11.5 L   (13.0-17.5)  gm/dL


 


Hct  37.1 L  34.4 L   (39.0-53.0)  %


 


Neutrophils #  9.4 H    (1.3-7.7)  k/uL


 


Lymphocytes #   0.8 L   (1.0-4.8)  k/uL


 


Glucose    172 H  (74-99)  mg/dL








 Microbiology - Last 24 Hours (Table)











 09/07/17 15:07 Gram Stain - Preliminary





 Lung - Left Tissue Culture - Preliminary


 


 09/07/17 15:07 Acid Fast Bacilli Culture - Preliminary





 Lung - Left 


 


 09/07/17 15:07 Anaerobic Culture - Preliminary





 Lung - Left 


 


 09/07/17 15:07 Fungal Culture - Preliminary





 Lung - Left 














Assessment and Plan


Plan: 





Impression: Status post left thoracoscopy, biopsy of mediastinal node and wedge 

resection of mass from left lingula.  Postoperative day #1





2.  Iatrogenic subcutaneous emphysema, however this will definitely improve 

knowing now that chest tube is placed back on suction.  Patient was reassured 

about the findings and he seems to be quite concerned.





2 multiple comorbidities including history of coronary artery disease, 

hypertension, hypothyroidism, and hyperlipidemia.  History of suspected COPD 

considering his smoking history.





Recommendation: Continue incentive spirometry, bronchodilators, early ambulation

, await results of the wedge resection and mediastinal node biopsy.  We'll 

continue to follow.  Chest tube should remain on suction.  Continue to follow, 

not ready for discharge planning at this point.


Time with Patient: Less than 30

## 2017-09-09 LAB
ALP SERPL-CCNC: 119 U/L (ref 38–126)
ALT SERPL-CCNC: 48 U/L (ref 21–72)
ANION GAP SERPL CALC-SCNC: 7 MMOL/L
AST SERPL-CCNC: 56 U/L (ref 17–59)
BASOPHILS # BLD AUTO: 0 K/UL (ref 0–0.2)
BASOPHILS NFR BLD AUTO: 0 %
BUN SERPL-SCNC: 13 MG/DL (ref 9–20)
CALCIUM SPEC-MCNC: 8.4 MG/DL (ref 8.4–10.2)
CH: 30.7
CHCM: 33.6
CHLORIDE SERPL-SCNC: 100 MMOL/L (ref 98–107)
CO2 SERPL-SCNC: 25 MMOL/L (ref 22–30)
EOSINOPHIL # BLD AUTO: 0.1 K/UL (ref 0–0.7)
EOSINOPHIL NFR BLD AUTO: 2 %
ERYTHROCYTE [DISTWIDTH] IN BLOOD BY AUTOMATED COUNT: 3.68 M/UL (ref 4.3–5.9)
ERYTHROCYTE [DISTWIDTH] IN BLOOD: 13.6 % (ref 11.5–15.5)
GLUCOSE SERPL-MCNC: 134 MG/DL (ref 74–99)
HCT VFR BLD AUTO: 33.8 % (ref 39–53)
HDW: 2.51
HEMOGLOBIN A1C: 7.4 % (ref 4.2–6.1)
HGB BLD-MCNC: 11.3 GM/DL (ref 13–17.5)
LUC NFR BLD AUTO: 3 %
LYMPHOCYTES # SPEC AUTO: 1.1 K/UL (ref 1–4.8)
LYMPHOCYTES NFR SPEC AUTO: 20 %
MCH RBC QN AUTO: 30.7 PG (ref 25–35)
MCHC RBC AUTO-ENTMCNC: 33.5 G/DL (ref 31–37)
MCV RBC AUTO: 91.8 FL (ref 80–100)
MONOCYTES # BLD AUTO: 0.3 K/UL (ref 0–1)
MONOCYTES NFR BLD AUTO: 5 %
NEUTROPHILS # BLD AUTO: 3.9 K/UL (ref 1.3–7.7)
NEUTROPHILS NFR BLD AUTO: 69 %
NON-AFRICAN AMERICAN GFR(MDRD): >60
POTASSIUM SERPL-SCNC: 4.2 MMOL/L (ref 3.5–5.1)
PROT SERPL-MCNC: 6 G/DL (ref 6.3–8.2)
SODIUM SERPL-SCNC: 132 MMOL/L (ref 137–145)
WBC # BLD AUTO: 0.18 10*3/UL
WBC # BLD AUTO: 5.6 K/UL (ref 3.8–10.6)
WBC (PEROX): 5.62

## 2017-09-09 RX ADMIN — CARVEDILOL SCH MG: 12.5 TABLET, FILM COATED ORAL at 16:41

## 2017-09-09 RX ADMIN — TAMSULOSIN HYDROCHLORIDE SCH MG: 0.4 CAPSULE ORAL at 16:41

## 2017-09-09 RX ADMIN — PREDNISOLONE ACETATE SCH DROPS: 10 SUSPENSION/ DROPS OPHTHALMIC at 08:53

## 2017-09-09 RX ADMIN — LEVOTHYROXINE SODIUM SCH MCG: 100 TABLET ORAL at 06:33

## 2017-09-09 RX ADMIN — HEPARIN SODIUM SCH UNIT: 5000 INJECTION, SOLUTION INTRAVENOUS; SUBCUTANEOUS at 08:52

## 2017-09-09 RX ADMIN — IPRATROPIUM BROMIDE AND ALBUTEROL SULFATE SCH ML: .5; 3 SOLUTION RESPIRATORY (INHALATION) at 20:38

## 2017-09-09 RX ADMIN — PANTOPRAZOLE SODIUM SCH MG: 40 TABLET, DELAYED RELEASE ORAL at 06:33

## 2017-09-09 RX ADMIN — HEPARIN SODIUM SCH UNIT: 5000 INJECTION, SOLUTION INTRAVENOUS; SUBCUTANEOUS at 15:42

## 2017-09-09 RX ADMIN — HEPARIN SODIUM SCH UNIT: 5000 INJECTION, SOLUTION INTRAVENOUS; SUBCUTANEOUS at 23:35

## 2017-09-09 RX ADMIN — ISOSORBIDE MONONITRATE SCH MG: 30 TABLET, EXTENDED RELEASE ORAL at 08:53

## 2017-09-09 RX ADMIN — IPRATROPIUM BROMIDE AND ALBUTEROL SULFATE SCH ML: .5; 3 SOLUTION RESPIRATORY (INHALATION) at 08:09

## 2017-09-09 RX ADMIN — MORPHINE SULFATE PRN MG: 2 INJECTION, SOLUTION INTRAMUSCULAR; INTRAVENOUS at 20:41

## 2017-09-09 RX ADMIN — MORPHINE SULFATE PRN MG: 2 INJECTION, SOLUTION INTRAMUSCULAR; INTRAVENOUS at 15:53

## 2017-09-09 RX ADMIN — FLUDROCORTISONE ACETATE SCH MG: 0.1 TABLET ORAL at 08:53

## 2017-09-09 RX ADMIN — IPRATROPIUM BROMIDE AND ALBUTEROL SULFATE SCH: .5; 3 SOLUTION RESPIRATORY (INHALATION) at 12:15

## 2017-09-09 RX ADMIN — IPRATROPIUM BROMIDE AND ALBUTEROL SULFATE SCH ML: .5; 3 SOLUTION RESPIRATORY (INHALATION) at 13:20

## 2017-09-09 RX ADMIN — DOCUSATE SODIUM SCH MG: 100 CAPSULE, LIQUID FILLED ORAL at 16:41

## 2017-09-09 RX ADMIN — TEMAZEPAM PRN MG: 15 CAPSULE ORAL at 22:42

## 2017-09-09 RX ADMIN — TEMAZEPAM PRN MG: 15 CAPSULE ORAL at 02:50

## 2017-09-09 RX ADMIN — CARVEDILOL SCH MG: 12.5 TABLET, FILM COATED ORAL at 06:33

## 2017-09-09 RX ADMIN — PREDNISOLONE ACETATE SCH DROPS: 10 SUSPENSION/ DROPS OPHTHALMIC at 20:42

## 2017-09-09 RX ADMIN — IPRATROPIUM BROMIDE AND ALBUTEROL SULFATE SCH: .5; 3 SOLUTION RESPIRATORY (INHALATION) at 13:23

## 2017-09-09 RX ADMIN — IPRATROPIUM BROMIDE AND ALBUTEROL SULFATE SCH ML: .5; 3 SOLUTION RESPIRATORY (INHALATION) at 16:28

## 2017-09-09 RX ADMIN — ATORVASTATIN CALCIUM SCH MG: 20 TABLET, FILM COATED ORAL at 20:42

## 2017-09-09 RX ADMIN — ASPIRIN 325 MG ORAL TABLET SCH MG: 325 PILL ORAL at 08:53

## 2017-09-09 NOTE — P.PN
Subjective


Principal diagnosis: 





Left lung mass, mediastinal adenopathy.  Hypertension, hyperlipidemia, 

hypothyroid, peripheral vascular disease with history of bifemoral bypass in 

the 1980s, history of mild coronary artery disease, chronic obstructive 

pulmonary disease, and severe gastroesophageal reflux disease.





POD #2, Left thoracoscopy, biopsy of mediastinal lymph node, wedge resection of 

mass from left lingula





The patient is sitting up to the bedside chair, he is alert and orientated 3.  

He reports that his pain is well controlled at this time and currently rates 

his pain 2-3 out of 10 on the pain scale.  No acute distress.  There is some 

scattered subcutaneous emphysema to his anterior chest and back, neck, right 

face and eye.  He reports that he has some urinary retention yesterday, a Barnett 

catheter was placed and he was started on Flomax.





Objective





- Vital Signs


Vital signs: 


 Vital Signs











Temp  97.2 F L  09/09/17 03:44


 


Pulse  82   09/09/17 08:21


 


Resp  20   09/09/17 03:44


 


BP  145/67   09/09/17 03:44


 


Pulse Ox  94 L  09/09/17 08:11








 Intake & Output











 09/08/17 09/09/17 09/09/17





 18:59 06:59 18:59


 


Intake Total 870  


 


Output Total 70 953 1400


 


Balance 800 -953 -1400


 


Weight  85 kg 


 


Intake:   


 


  Oral 870  


 


Output:   


 


  Chest Tube Drainage 70 53 


 


    Chest Tube Left Lateral 70 53 





    Chest   


 


  Urine  900 1400


 


Other:   


 


  Voiding Method Indwelling Catheter Indwelling Catheter 


 


  # Voids  1 














- Constitutional


General appearance: Present: cooperative, no acute distress





- EENT


EENT Comment(s): 





Subcu emphysema surrounding his right eye.


ENT: Present: hearing grossly normal





- Neck


Details: 





Subcu emphysema to his neck





- Respiratory


Details: 





Lung sounds with scattered expiratory wheezes, diminished to his bilateral 

bases.  Respirations are symmetrical and nonlabored.  Left pleural chest tube 

remained in place and is to continuous low wall suction at -20 cm H2O.  

Positive intermittent air leak.  The chest tube is draining thin 

serosanguineous drainage 45 mL output in the last 8 hours, 150 mL output in the 

last 24 hours.  He is achieving 1000 mL on his incentive spirometry.  Oxygen 

saturations are 94% on 3 L nasal cannula.





- Cardiovascular


Details: 





Regular rhythm and rate.  S1 and S2 present, positive systolic murmur.  Remote 

telemetry showing normal sinus rhythm heart rate 78.  No edema present.  Knee-

high GIL hose and sequential compression devices in place to his bilateral 

lower extremities.





- Gastrointestinal


Gastrointestinal Comment(s): 





Abdomen is soft, nontender and nondistended.  Active bowel sounds to all 4 

abdominal quadrants.  He is tolerating oral intake.





- Genitourinary


Genitourinary Comment(s): 





Barnett catheter in place for urinary retention and accurate I&O.  Clear yellow 

urine.





- Integumentary


Integumentary Comment(s): 





Left chest incisions clean dry and well approximated.  Chest tube insertion 

site with scant serosanguineous drainage.  Dressings clean, dry and intact.  

Blow hole to his left anterior chest with scant serosanguineous drainage.  

Dressing changed this a.m.





- Neurologic


Neurologic Comment(s): 





No focal deficits.





- Musculoskeletal


Musculoskeletal: Present: gait normal, strength equal bilaterally





- Psychiatric


Psychiatric: Present: A&O x's 3, appropriate affect, intact judgment & insight





- Allied health notes


Allied health notes reviewed: nursing





- Labs


CBC & Chem 7: 


 09/09/17 06:08





 09/09/17 06:11


Labs: 


 Abnormal Lab Results - Last 24 Hours (Table)











  09/09/17 09/09/17 Range/Units





  06:08 06:11 


 


RBC  3.68 L   (4.30-5.90)  m/uL


 


Hgb  11.3 L   (13.0-17.5)  gm/dL


 


Hct  33.8 L   (39.0-53.0)  %


 


Sodium   132 L  (137-145)  mmol/L


 


Glucose   134 H  (74-99)  mg/dL


 


Total Protein   6.0 L  (6.3-8.2)  g/dL


 


Albumin   3.1 L  (3.5-5.0)  g/dL


 


TSH   11.900 H  (0.465-4.680)  mIU/L








 Microbiology - Last 24 Hours (Table)











 09/07/17 15:07 Acid Fast Bacilli Smear - Final





 Lung - Left Acid Fast Bacilli Culture - Preliminary


 


 09/07/17 15:07 Gram Stain - Preliminary





 Lung - Left Tissue Culture - Preliminary














- Imaging and Cardiology


Chest x-ray: report reviewed, image reviewed





Assessment and Plan


(1) Hypertension


Status: Acute   





(2) Hyperlipidemia


Status: Acute   





(3) Mass of left lung


Status: Acute   





(4) Mediastinal lymphadenopathy


Status: Acute   





(5) COPD (chronic obstructive pulmonary disease)


Status: Acute   





(6) History of coronary artery disease


Status: Acute   





(7) GERD (gastroesophageal reflux disease)


Status: Acute   





(8) Hypothyroid


Status: Acute   





(9) Subcutaneous emphysema, postoperative


Status: Acute   


Plan: 





1.  Continue left pleural chest tube to low continuous wall suction at -20 cm 

of H2O


2.  Monitor daily chest x-ray.


3.  Increase activity as tolerated.


4.  Encourage use of his incentive spirometry every hour while awake.


5.  Pulmonary management per Dr. Garcia's recommendations.


6.  DVT and GI prophylaxis in place.


7.  Medical management recommendations per primary care.


8.  Pathology results remain pending.


9.  Further recommendations as patient progresses.


Time with Patient: Greater than 30

## 2017-09-09 NOTE — XR
EXAMINATION TYPE: XR chest 1V

 

DATE OF EXAM: 9/9/2017

 

HISTORY: Postop Left video-assisted thoracoscopy.

 

REFERENCE: Previous study dated 9/8/2017.

 

FINDINGS: There is a left pleural drain in place, unchanged in appearance. There is worsening subcuta
neous emphysema bilaterally.

 

No definite pneumothorax is seen at this time. Heart size is upper limits of normal. There is some ai
rspace disease in the left midlung which may represent atelectasis, pneumonia or contusion.

 

IMPRESSION: 

1. RESOLUTION OF THE PATIENT'S LEFT-SIDED PNEUMOTHORAX.

2. WORSENING SUBCUTANEOUS EMPHYSEMA.

3. AIRSPACE DISEASE, LEFT MIDLUNG MAY REFLECT ATELECTASIS, PNEUMONIA OR CONTUSION.

## 2017-09-09 NOTE — P.PN
Subjective





This is a pleasant 84-year-old  gentleman patient of Dr. Myah Walsh underlying history of COPD hypertension hyperlipidemia hyperglycemia BPH 

and mild CAD based on heart cath in 2014 requiring on the medical management, 

was noted to have a spot in the left lung based on an abnormal CT of the chest 

and abnormal PET/CT ,


On 9/72017 Patient underwent left thoracoscopy, biopsy of mediastinal lymph 

wedge resection of mass from left lingula, workup done in the past few weeks 

include a PET/CT performed 07/29/2017 showing hypermetabolic uptake in the left 

upper lobe nodule 12 mm pleural-based in the anterior mediastinum.  Patient 

currently has strep throat ostomy tube, and developed subcutaneous emphysema 

overnight, with upper chest swelling swollen face and swollen right eyelid.  

Patient currently stable, no active problems at this time no shortness of 

breath no chest pain no  or GI complaints








9/9:, Subcutaneous emphysema has stabilized and clinically is better, still has 

swollen eyelid from subcutaneous emphysema, no shortness of breath no chest 

pain.  Patient complains of constipation.  No melena and hematochezia no 

shortness of breath.  Thoracostomy chest wall tube to suction





Objective





- Vital Signs


Vital signs: 


 Vital Signs











Temp  97.7 F   09/09/17 08:00


 


Pulse  82   09/09/17 08:21


 


Resp  18   09/09/17 08:00


 


BP  106/53   09/09/17 08:00


 


Pulse Ox  94 L  09/09/17 08:11








 Intake & Output











 09/08/17 09/09/17 09/09/17





 18:59 06:59 18:59


 


Intake Total 870  


 


Output Total 70 953 1464


 


Balance 800 -953 -1464


 


Weight  85 kg 


 


Intake:   


 


  Oral 870  


 


Output:   


 


  Chest Tube Drainage 70 53 64


 


    Chest Tube Left Lateral 70 53 64





    Chest   


 


  Urine  900 1400


 


Other:   


 


  Voiding Method Indwelling Catheter Indwelling Catheter Indwelling Catheter


 


  # Voids  1 














- Constitutional


General appearance: Present: cooperative, no acute distress





- EENT


Eyes: Present: anicteric sclerae, EOMI, PERRLA, dentition normal, normal 

appearance


ENT: Present: hard of hearing, NA/AT, normal oropharynx





- Neck


Neck: Present: normal ROM





- Respiratory


Respiratory: bilateral: CTA, negative: dullness, rales, rhonchi





- Cardiovascular


Rhythm: regular


Heart sounds: normal: S1, S2


Abnormal Heart Sounds: Present: systolic murmur.  Absent: diastolic murmur, rub

, S3 Gallop, S4 Gallop, click, other





- Gastrointestinal


General gastrointestinal: Present: normal bowel sounds, soft





- Integumentary


Integumentary: Present: normal, normal turgor





- Neurologic


Neurologic: Present: CNII-XII intact





- Musculoskeletal


Musculoskeletal: Present: gait normal, strength equal bilaterally





- Psychiatric


Psychiatric: Present: A&O x's 3, appropriate affect





- Labs


CBC & Chem 7: 


 09/09/17 06:08





 09/09/17 06:11


Labs: 


 Abnormal Lab Results - Last 24 Hours (Table)











  09/09/17 09/09/17 09/09/17 Range/Units





  06:08 06:11 06:11 


 


RBC  3.68 L    (4.30-5.90)  m/uL


 


Hgb  11.3 L    (13.0-17.5)  gm/dL


 


Hct  33.8 L    (39.0-53.0)  %


 


Sodium   132 L   (137-145)  mmol/L


 


Glucose   134 H   (74-99)  mg/dL


 


Hemoglobin A1c    7.4 H  (4.2-6.1)  %


 


Total Protein   6.0 L   (6.3-8.2)  g/dL


 


Albumin   3.1 L   (3.5-5.0)  g/dL


 


TSH   11.900 H   (0.465-4.680)  mIU/L








 Microbiology - Last 24 Hours (Table)











 09/07/17 15:07 Acid Fast Bacilli Smear - Final





 Lung - Left Acid Fast Bacilli Culture - Preliminary


 


 09/07/17 15:07 Gram Stain - Preliminary





 Lung - Left Tissue Culture - Preliminary














Assessment and Plan


Plan: 








1  status post thoracotomy and wedge resection of lingular mass postop day #1.  

Procedure performed 09/07/2017 pathology sent, . followed by Dr. Gomez and 

pulmonary medicine





2 subcutaneous emphysema with facial bloating and right eyelid swelling. 

monitor for resolution and progression. currently has left chest tube to suction





3 CAD: Known to have coronary disease from previous heart cath with medical 

management, stable asymptomatic on Imdur 30 daily, Coreg, Lipitor, Norvasc asa 

325 daily





4 COPD: No Current symptoms Continue patient on his current inhaler.





5 hypertension: Remain on carvedilol 12.5 mg g twice a day.  Amlodipine 10 mg 

daily Imdur 30 mg daily





6 hyperlipidemia: On atorvastatin 20 mg daily.





7 hypothyroidism: Remain on Synthroid 100 g daily.  Currently uncontrolled TSH 

of 11, levothyroxine increased 125 g daily





8 severe GERD: Will do omeprazole 20 mg twice a day.





9 DVT prophylaxis: Patient will be on heparin drip for now and switch to 

heparin subcutaneous and knee-high GIL hose.





10.  Chronic supplementation with Florinef 0.05 mg daily possibly for autonomic 

dysfunction rather than adrenal insufficiency





12.  Mild anemia possibly blood loss related, monitor for decline





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





Impaired tandem blood sugars, hemoglobin A1c will be obtained





Transient hypoxemic respiratory failure with hypoxemia, currently on 2-3 L 

nasal cannula, patient to continue on incentive spirometry





GI prophylaxis Protonix





DVT prophylaxis with subcutaneous heparin





Thank you Dr. Gomez in allowing us to participate in the care.  Patient.  We'

ll going to follow him with you during his current hospitalization with 

recommendations to his treatment based on his clinical progress.  This feel 

free to contact us should there be any concerns  and we would gladly be of 

assistance





CODE STATUS: Full code.

## 2017-09-09 NOTE — P.PN
Subjective


Principal diagnosis: 


Left lung mass, mediastinal adenopathy.  Hypertension, hyperlipidemia, 

hypothyroid, peripheral vascular disease with history of bifemoral bypass in 

the 1980s, history of mild coronary artery disease, chronic obstructive 

pulmonary disease, and severe gastroesophageal reflux disease.








Patient was reevaluated today on 9/9/2017, he is postoperative day #2, status 

post left thoracoscopy biopsy of mediastinal nodes and wedge resection of mass 

from lingula.  Patient is doing well, however he developed significant 

subcutaneous emphysema mostly because for some reason the chest tube was taken 

off suction and placed on waterseal.  No evidence of pneumothorax, but there is 

evidence of significant subcu emphysema on the chest x-ray today.  Patient 

denies any shortness of breath, but seems to be quite bothered with the 

puffiness and swelling in the chest wall and in the face.  Seems to be 

extending to the right orbital area.  Clinically the patient is feeling better, 

chest tube remains on suction, no cough no wheezing no shortness of breath, no 

chest pain.  Swelling seems to be improving.








Objective





- Vital Signs


Vital signs: 


 Vital Signs











Temp  97.7 F   09/09/17 08:00


 


Pulse  82   09/09/17 08:21


 


Resp  18   09/09/17 08:00


 


BP  106/53   09/09/17 08:00


 


Pulse Ox  94 L  09/09/17 08:11








 Intake & Output











 09/08/17 09/09/17 09/09/17





 18:59 06:59 18:59


 


Intake Total 870  


 


Output Total 70 953 1464


 


Balance 800 -953 -1464


 


Weight  85 kg 


 


Intake:   


 


  Oral 870  


 


Output:   


 


  Chest Tube Drainage 70 53 64


 


    Chest Tube Left Lateral 70 53 64





    Chest   


 


  Urine  900 1400


 


Other:   


 


  Voiding Method Indwelling Catheter Indwelling Catheter Indwelling Catheter


 


  # Voids  1 














- Exam





Physical Exam: Revealed an 84-year-old white male in no distress, significant 

subcutaneous emphysema noted in the face and in the anterior chest wall area.


HEENT:[Neck is supple.] [No neck masses.] [No thyromegaly.] [No JVD.]


Chest: Diminished breath sounds at the bases, left sided tube thoracostomy is 

noted.]  Upper cutaneous emphysema noted in the right and left chest wall 

anteriorly.


Cardiac Exam: [Normal S1 and S2, no S3 gallop, no murmur.]


Abdomen: [Soft, nontender,  no megaly, no rebound, no guarding, normal bowel 

sounds.]


Extremities: [No clubbing, no edema, no cyanosis.]


Neurological Exam: [No focal neurologic deficit.]








- Labs


CBC & Chem 7: 


 09/09/17 06:08





 09/09/17 06:11


Labs: 


 Abnormal Lab Results - Last 24 Hours (Table)











  09/09/17 09/09/17 Range/Units





  06:08 06:11 


 


RBC  3.68 L   (4.30-5.90)  m/uL


 


Hgb  11.3 L   (13.0-17.5)  gm/dL


 


Hct  33.8 L   (39.0-53.0)  %


 


Sodium   132 L  (137-145)  mmol/L


 


Glucose   134 H  (74-99)  mg/dL


 


Total Protein   6.0 L  (6.3-8.2)  g/dL


 


Albumin   3.1 L  (3.5-5.0)  g/dL


 


TSH   11.900 H  (0.465-4.680)  mIU/L








 Microbiology - Last 24 Hours (Table)











 09/07/17 15:07 Acid Fast Bacilli Smear - Final





 Lung - Left Acid Fast Bacilli Culture - Preliminary


 


 09/07/17 15:07 Gram Stain - Preliminary





 Lung - Left Tissue Culture - Preliminary














Assessment and Plan


Plan: 





Impression: Status post left thoracoscopy, biopsy of mediastinal node and wedge 

resection of mass from left lingula.  Postoperative day #2





2.  Iatrogenic subcutaneous emphysema, however this will definitely improve 

knowing now that chest tube is placed back on suction.  Patient was reassured 

about the findings and he seems to be quite concerned.





2 multiple comorbidities including history of coronary artery disease, 

hypertension, hypothyroidism, and hyperlipidemia.  History of suspected COPD 

considering his smoking history.





Recommendation: Continue incentive spirometry, bronchodilators, early ambulation

, await results of the wedge resection and mediastinal node biopsy.  We'll 

continue to follow.  Chest tube should remain on suction.  Continue to follow, 

not ready for discharge planning at this point.


Time with Patient: Less than 30

## 2017-09-10 LAB
ALP SERPL-CCNC: 120 U/L (ref 38–126)
ALT SERPL-CCNC: 41 U/L (ref 21–72)
ANION GAP SERPL CALC-SCNC: 6 MMOL/L
AST SERPL-CCNC: 35 U/L (ref 17–59)
BASOPHILS # BLD AUTO: 0 K/UL (ref 0–0.2)
BASOPHILS NFR BLD AUTO: 0 %
BUN SERPL-SCNC: 14 MG/DL (ref 9–20)
CALCIUM SPEC-MCNC: 8.4 MG/DL (ref 8.4–10.2)
CH: 31.4
CHCM: 34.2
CHLORIDE SERPL-SCNC: 100 MMOL/L (ref 98–107)
CO2 SERPL-SCNC: 28 MMOL/L (ref 22–30)
EOSINOPHIL # BLD AUTO: 0.1 K/UL (ref 0–0.7)
EOSINOPHIL NFR BLD AUTO: 3 %
ERYTHROCYTE [DISTWIDTH] IN BLOOD BY AUTOMATED COUNT: 3.57 M/UL (ref 4.3–5.9)
ERYTHROCYTE [DISTWIDTH] IN BLOOD: 14.3 % (ref 11.5–15.5)
GLUCOSE SERPL-MCNC: 142 MG/DL (ref 74–99)
HCT VFR BLD AUTO: 33 % (ref 39–53)
HDW: 2.54
HGB BLD-MCNC: 11 GM/DL (ref 13–17.5)
LUC NFR BLD AUTO: 3 %
LYMPHOCYTES # SPEC AUTO: 1 K/UL (ref 1–4.8)
LYMPHOCYTES NFR SPEC AUTO: 20 %
MCH RBC QN AUTO: 30.8 PG (ref 25–35)
MCHC RBC AUTO-ENTMCNC: 33.3 G/DL (ref 31–37)
MCV RBC AUTO: 92.4 FL (ref 80–100)
MONOCYTES # BLD AUTO: 0.4 K/UL (ref 0–1)
MONOCYTES NFR BLD AUTO: 7 %
NEUTROPHILS # BLD AUTO: 3.3 K/UL (ref 1.3–7.7)
NEUTROPHILS NFR BLD AUTO: 67 %
NON-AFRICAN AMERICAN GFR(MDRD): >60
POTASSIUM SERPL-SCNC: 3.9 MMOL/L (ref 3.5–5.1)
PROT SERPL-MCNC: 5.9 G/DL (ref 6.3–8.2)
SODIUM SERPL-SCNC: 134 MMOL/L (ref 137–145)
WBC # BLD AUTO: 0.12 10*3/UL
WBC # BLD AUTO: 4.8 K/UL (ref 3.8–10.6)
WBC (PEROX): 4.95

## 2017-09-10 RX ADMIN — HEPARIN SODIUM SCH UNIT: 5000 INJECTION, SOLUTION INTRAVENOUS; SUBCUTANEOUS at 15:48

## 2017-09-10 RX ADMIN — DOCUSATE SODIUM SCH MG: 100 CAPSULE, LIQUID FILLED ORAL at 08:11

## 2017-09-10 RX ADMIN — HEPARIN SODIUM SCH UNIT: 5000 INJECTION, SOLUTION INTRAVENOUS; SUBCUTANEOUS at 23:06

## 2017-09-10 RX ADMIN — TEMAZEPAM PRN MG: 15 CAPSULE ORAL at 23:06

## 2017-09-10 RX ADMIN — TAMSULOSIN HYDROCHLORIDE SCH MG: 0.4 CAPSULE ORAL at 17:17

## 2017-09-10 RX ADMIN — PREDNISOLONE ACETATE SCH DROPS: 10 SUSPENSION/ DROPS OPHTHALMIC at 08:12

## 2017-09-10 RX ADMIN — PANTOPRAZOLE SODIUM SCH MG: 40 TABLET, DELAYED RELEASE ORAL at 06:42

## 2017-09-10 RX ADMIN — HYDROCODONE BITARTRATE AND ACETAMINOPHEN PRN EACH: 5; 325 TABLET ORAL at 23:05

## 2017-09-10 RX ADMIN — LEVOTHYROXINE SODIUM SCH MCG: 0.12 TABLET ORAL at 06:42

## 2017-09-10 RX ADMIN — HEPARIN SODIUM SCH UNIT: 5000 INJECTION, SOLUTION INTRAVENOUS; SUBCUTANEOUS at 08:12

## 2017-09-10 RX ADMIN — ATORVASTATIN CALCIUM SCH MG: 20 TABLET, FILM COATED ORAL at 21:33

## 2017-09-10 RX ADMIN — MORPHINE SULFATE PRN MG: 2 INJECTION, SOLUTION INTRAMUSCULAR; INTRAVENOUS at 04:17

## 2017-09-10 RX ADMIN — FLUDROCORTISONE ACETATE SCH MG: 0.1 TABLET ORAL at 08:11

## 2017-09-10 RX ADMIN — CARVEDILOL SCH MG: 12.5 TABLET, FILM COATED ORAL at 17:17

## 2017-09-10 RX ADMIN — PREDNISOLONE ACETATE SCH DROPS: 10 SUSPENSION/ DROPS OPHTHALMIC at 21:33

## 2017-09-10 RX ADMIN — HYDROCODONE BITARTRATE AND ACETAMINOPHEN PRN EACH: 5; 325 TABLET ORAL at 13:18

## 2017-09-10 RX ADMIN — HYDROCODONE BITARTRATE AND ACETAMINOPHEN PRN EACH: 5; 325 TABLET ORAL at 18:39

## 2017-09-10 RX ADMIN — ISOSORBIDE MONONITRATE SCH MG: 30 TABLET, EXTENDED RELEASE ORAL at 08:11

## 2017-09-10 RX ADMIN — IPRATROPIUM BROMIDE AND ALBUTEROL SULFATE SCH: .5; 3 SOLUTION RESPIRATORY (INHALATION) at 17:16

## 2017-09-10 RX ADMIN — IPRATROPIUM BROMIDE AND ALBUTEROL SULFATE SCH ML: .5; 3 SOLUTION RESPIRATORY (INHALATION) at 12:39

## 2017-09-10 RX ADMIN — CARVEDILOL SCH MG: 12.5 TABLET, FILM COATED ORAL at 06:42

## 2017-09-10 RX ADMIN — ASPIRIN 325 MG ORAL TABLET SCH MG: 325 PILL ORAL at 08:10

## 2017-09-10 RX ADMIN — IPRATROPIUM BROMIDE AND ALBUTEROL SULFATE SCH ML: .5; 3 SOLUTION RESPIRATORY (INHALATION) at 09:01

## 2017-09-10 RX ADMIN — IPRATROPIUM BROMIDE AND ALBUTEROL SULFATE SCH ML: .5; 3 SOLUTION RESPIRATORY (INHALATION) at 20:58

## 2017-09-10 NOTE — P.PN
Subjective





This is a pleasant 84-year-old  gentleman patient of Dr. Myah Walsh underlying history of COPD hypertension hyperlipidemia hyperglycemia BPH 

and mild CAD based on heart cath in 2014 requiring on the medical management, 

was noted to have a spot in the left lung based on an abnormal CT of the chest 

and abnormal PET/CT ,


On 9/72017 Patient underwent left thoracoscopy, biopsy of mediastinal lymph 

wedge resection of mass from left lingula, workup done in the past few weeks 

include a PET/CT performed 07/29/2017 showing hypermetabolic uptake in the left 

upper lobe nodule 12 mm pleural-based in the anterior mediastinum.  Patient 

currently has strep throat ostomy tube, and developed subcutaneous emphysema 

overnight, with upper chest swelling swollen face and swollen right eyelid.  

Patient currently stable, no active problems at this time no shortness of 

breath no chest pain no  or GI complaints








9/9:, Subcutaneous emphysema has stabilized and clinically is better, still has 

swollen eyelid from subcutaneous emphysema, no shortness of breath no chest 

pain.  Patient complains of constipation.  No melena and hematochezia no 

shortness of breath.  Thoracostomy chest wall tube to suction





9/10: Patient remains on chest tube with to suction, subcutaneous emphysema 

still noted on the face right orbit and chest, no new complaints today no chest 

pain no nausea no vomiting or diarrhea. Chest x-ray only shows minimal left 

pleural effusion, no pneumothorax for catheter is to be discontinued today 

patient on Flomax





Objective





- Vital Signs


Vital signs: 


 Vital Signs











Temp  97.2 F L  09/10/17 08:00


 


Pulse  78   09/10/17 09:11


 


Resp  16   09/10/17 09:11


 


BP  111/54   09/10/17 08:00


 


Pulse Ox  96   09/10/17 09:01








 Intake & Output











 09/09/17 09/10/17 09/10/17





 18:59 06:59 18:59


 


Intake Total 720  360


 


Output Total 2164 850 600


 


Balance -2004 -850 -240


 


Weight  83 kg 


 


Intake:   


 


  Oral 720  360


 


Output:   


 


  Chest Tube Drainage 64 50 


 


    Chest Tube Left Lateral 64 50 





    Chest   


 


  Urine 2100 800 600


 


    Straight   600


 


Other:   


 


  Voiding Method Indwelling Catheter Indwelling Catheter Indwelling Catheter














- Constitutional


General appearance: Present: cooperative, no acute distress





- EENT


Eyes: Present: anicteric sclerae, EOMI, PERRLA, dentition normal, normal 

appearance


ENT: Present: NA/AT, normal oropharynx





- Neck


Neck: Present: normal ROM





- Respiratory


Respiratory: bilateral: CTA, negative: diminished, dullness, prolonged 

expiration, prolonged inspiration





- Cardiovascular


Rhythm: regular


Heart sounds: normal: S1, S2


Abnormal Heart Sounds: Absent: systolic murmur, diastolic murmur, rub, S3 Gallop

, S4 Gallop, click, other





- Gastrointestinal


General gastrointestinal: Present: normal bowel sounds, soft





- Integumentary


Integumentary: Present: normal, normal turgor





- Neurologic


Neurologic: Present: CNII-XII intact





- Musculoskeletal


Musculoskeletal: Present: gait normal, strength equal bilaterally





- Psychiatric


Psychiatric: Present: A&O x's 3, appropriate affect, intact judgment & insight





- Labs


CBC & Chem 7: 


 09/10/17 05:31





 09/10/17 05:31


Labs: 


 Abnormal Lab Results - Last 24 Hours (Table)











  09/10/17 09/10/17 Range/Units





  05:31 05:31 


 


RBC  3.57 L   (4.30-5.90)  m/uL


 


Hgb  11.0 L   (13.0-17.5)  gm/dL


 


Hct  33.0 L   (39.0-53.0)  %


 


Sodium   134 L  (137-145)  mmol/L


 


Glucose   142 H  (74-99)  mg/dL


 


Total Protein   5.9 L  (6.3-8.2)  g/dL


 


Albumin   3.1 L  (3.5-5.0)  g/dL








 Microbiology - Last 24 Hours (Table)











 09/07/17 15:07 Anaerobic Culture - Preliminary





 Lung - Left 


 


 09/07/17 15:07 Gram Stain - Preliminary





 Lung - Left Tissue Culture - Preliminary














Assessment and Plan


Plan: 








1  status post thoracotomy and wedge resection of lingular mass postop day #1.  

Procedure performed 09/07/2017 pathology sent, . followed by Dr. Gomez and 

pulmonary medicine





2 subcutaneous emphysema with facial bloating and right eyelid swelling. 

monitor for resolution and progression. currently has left chest tube to suction





3 CAD: Known to have coronary disease from previous heart cath with medical 

management, stable asymptomatic on Imdur 30 daily, Coreg, Lipitor, Norvasc asa 

325 daily





4 COPD: No Current symptoms Continue patient on his current inhaler.





5 hypertension: Remain on carvedilol 12.5 mg g twice a day.  Amlodipine 10 mg 

daily Imdur 30 mg daily





6 hyperlipidemia: On atorvastatin 20 mg daily.





7 hypothyroidism: Remain on Synthroid 100 g daily.  Currently uncontrolled TSH 

of 11, levothyroxine increased 125 g daily





8 severe GERD: Will do omeprazole 20 mg twice a day.





9 DVT prophylaxis: Patient will be on heparin drip for now and switch to 

heparin subcutaneous and knee-high GIL hose.





10.  Chronic supplementation with Florinef 0.05 mg daily possibly for autonomic 

dysfunction rather than adrenal insufficiency





12.  Mild anemia possibly blood loss related, monitor for decline





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





Impaired tandem blood sugars, hemoglobin A1c will be obtained





Transient hypoxemic respiratory failure with hypoxemia, currently on 2-3 L 

nasal cannula, patient to continue on incentive spirometry





GI prophylaxis Protonix





DVT prophylaxis with subcutaneous heparin





Thank you Dr. Gomez in allowing us to participate in the care.  Patient.  We'

ll going to follow him with you during his current hospitalization with 

recommendations to his treatment based on his clinical progress.  This feel 

free to contact us should there be any concerns  and we would gladly be of 

assistance





CODE STATUS: Full code.

## 2017-09-10 NOTE — XR
EXAMINATION TYPE: XR chest 1V portable

 

DATE OF EXAM: 9/10/2017

 

HISTORY: Postoperative left VATS with wedge resection.

 

REFERENCE: Previous study dated 9/9/2017.

 

FINDINGS: Patient's left sided pleural drain remains in place unchanged in appearance. There continue
s to be extensive subcutaneous emphysema greater on the left than the right. I do not see a definite 
residual pneumothorax. Heart size is upper limits of normal. There is some left basilar airspace dise
ase likely representing atelectasis. There is minimal blunting of a CP angle. I could not exclude a s
mall effusion.

 

IMPRESSION: 

1. NO DEFINITE RESIDUAL PNEUMOTHORAX.

2. EXTENSIVE SUBCUTANEOUS EMPHYSEMA.

3. BORDERLINE CARDIOMEGALY.

4. I COULD NOT EXCLUDE A SMALL, LEFT-SIDED EFFUSION.

## 2017-09-10 NOTE — P.PN
<Casey Sandoval - Last Filed: 09/10/17 09:26>





Subjective


Principal diagnosis: 





Left lung mass, mediastinal adenopathy.  Hypertension, hyperlipidemia, 

hypothyroid, peripheral vascular disease with history of bifemoral bypass in 

the 1980s, history of mild coronary artery disease, chronic obstructive 

pulmonary disease, and severe gastroesophageal reflux disease.





POD #3, Left thoracoscopy, biopsy of mediastinal lymph node, wedge resection of 

mass from left lingula





The patient is sitting up to the bedside chair, he is alert and orientated 3.  

He reports that his pain is well controlled at this time and currently rates 

his pain at 0 out of 10 on the pain scale.  No acute distress.  There is some 

scattered subcutaneous emphysema to his anterior chest and back, neck, 

bilateral upper extremities, right face and eye. Barnett catheter was placed for 

urinary retention and he was started on Flomax and has received 2 doses of the 

Flomax..





Objective





- Vital Signs


Vital signs: 


 Vital Signs











Temp  97.1 F L  09/10/17 04:20


 


Pulse  78   09/10/17 09:11


 


Resp  16   09/10/17 09:11


 


BP  143/63   09/10/17 04:20


 


Pulse Ox  96   09/10/17 09:01








 Intake & Output











 09/09/17 09/10/17 09/10/17





 18:59 06:59 18:59


 


Intake Total 720  360


 


Output Total 2164 850 


 


Balance -1444 -850 360


 


Weight  83 kg 


 


Intake:   


 


  Oral 720  360


 


Output:   


 


  Chest Tube Drainage 64 50 


 


    Chest Tube Left Lateral 64 50 





    Chest   


 


  Urine 2100 800 


 


Other:   


 


  Voiding Method Indwelling Catheter Indwelling Catheter 














- Constitutional


General appearance: Present: cooperative, no acute distress, obese





- EENT


EENT Comment(s): 





Right eye closed due to subcu emphysema.  His left periorbital with scant subcu 

emphysema.


ENT: Present: hard of hearing





- Neck


Details: 





Subcu emphysema to his neck





- Respiratory


Details: 





Lung sounds essentially clear throughout, diminished bilateral bases.  

Subcutaneous emphysema to his anterior and posterior chest.  Respirations are 

symmetrical and nonlabored.  Oxygen saturation are 96% on room air.  He is 

achieving 1000 mL on his incentive spirometry.  Left pleural chest tube remains 

intact and with intermittent air leak.  Remains to low continuous wall suction 

at -20 cm H2O.  Draining thin serosanguineous drainage 100 mL output in the 

last 24 hours.





- Cardiovascular


Details: 





Regular rhythm and rate.  S1 and S2 present, negative for S3, gallop or murmur.

  Knee-high GIL hose and sequential compression devices in place to his 

bilateral lower extremities.  Remote telemetry showing normal sinus rhythm 

heart rate 78.  No edema present.





- Gastrointestinal


Gastrointestinal Comment(s): 





Abdomen is soft, nontender and nondistended.  Active bowel sounds to all 4 

abdominal quadrants.  No bowel movement since surgery.





- Genitourinary


Genitourinary Comment(s): 





Barnett catheter in place due to urinary retention.  Clear yellow urine.





- Integumentary


Integumentary Comment(s): 





Left chest incisions clean dry and well approximated.  Chest tube insertion 

site with scant serosanguineous drainage.  Dressings clean, dry and intact.  

Blow hole to his left anterior chest with scant serosanguineous drainage.  

Dressing changed this a.m.





- Neurologic


Neurologic: Present: CNII-XII intact





- Musculoskeletal


Musculoskeletal: Present: gait normal, strength equal bilaterally





- Psychiatric


Psychiatric: Present: A&O x's 3, appropriate affect, intact judgment & insight





- Allied health notes


Allied health notes reviewed: nursing





- Labs


CBC & Chem 7: 


 09/10/17 05:31





 09/10/17 05:31


Labs: 


 Abnormal Lab Results - Last 24 Hours (Table)











  09/09/17 09/10/17 09/10/17 Range/Units





  06:11 05:31 05:31 


 


RBC   3.57 L   (4.30-5.90)  m/uL


 


Hgb   11.0 L   (13.0-17.5)  gm/dL


 


Hct   33.0 L   (39.0-53.0)  %


 


Sodium    134 L  (137-145)  mmol/L


 


Glucose    142 H  (74-99)  mg/dL


 


Hemoglobin A1c  7.4 H    (4.2-6.1)  %


 


Total Protein    5.9 L  (6.3-8.2)  g/dL


 


Albumin    3.1 L  (3.5-5.0)  g/dL








 Microbiology - Last 24 Hours (Table)











 09/07/17 15:07 Anaerobic Culture - Preliminary





 Lung - Left 


 


 09/07/17 15:07 Gram Stain - Preliminary





 Lung - Left Tissue Culture - Preliminary














- Imaging and Cardiology


Chest x-ray: report reviewed, image reviewed





Assessment and Plan


(1) Hypertension


Status: Acute   





(2) Hyperlipidemia


Status: Acute   





(3) Mass of left lung


Status: Acute   





(4) Mediastinal lymphadenopathy


Status: Acute   





(5) COPD (chronic obstructive pulmonary disease)


Status: Acute   





(6) History of coronary artery disease


Status: Acute   





(7) GERD (gastroesophageal reflux disease)


Status: Acute   





(8) Hypothyroid


Status: Acute   





(9) Subcutaneous emphysema, postoperative


Status: Acute   


Plan: 





1.  Continue left pleural chest tube to low continuous wall suction at -20 cm 

of H2O.  Continuous suction until air leak and subcutaneous emphysema has 

resolved.


2.  Monitor daily chest x-ray.


3.  Increase activity as tolerated.


4.  Encourage use of his incentive spirometry every hour while awake.


5.  Pulmonary management per Dr. Garcia's recommendations.


6.  DVT and GI prophylaxis in place.


7.  Medical management recommendations per primary care.


8.  Pathology results remain pending.  Preliminary cultures negative for any 

growth.


9.  Discontinue Barnett catheter today.  Continue Flomax.  


10. Further recommendations as patient progresses.


Time with Patient: Greater than 30





<Drew Miller - Last Filed: 09/10/17 13:16>





Objective





- Vital Signs


Vital signs: 


 Vital Signs











Temp  97.2 F L  09/10/17 08:00


 


Pulse  78   09/10/17 12:49


 


Resp  16   09/10/17 12:49


 


BP  111/54   09/10/17 08:00


 


Pulse Ox  96   09/10/17 09:01








 Intake & Output











 09/09/17 09/10/17 09/10/17





 18:59 06:59 18:59


 


Intake Total 720  600


 


Output Total 2164 850 600


 


Balance -1444 -850 0


 


Weight  83 kg 


 


Intake:   


 


  Oral 720  600


 


Output:   


 


  Chest Tube Drainage 64 50 


 


    Chest Tube Left Lateral 64 50 





    Chest   


 


  Urine 2100 800 600


 


    Straight   600


 


Other:   


 


  Voiding Method Indwelling Catheter Indwelling Catheter Indwelling Catheter














- Labs


CBC & Chem 7: 


 09/10/17 05:31





 09/10/17 05:31


Labs: 


 Abnormal Lab Results - Last 24 Hours (Table)











  09/10/17 09/10/17 Range/Units





  05:31 05:31 


 


RBC  3.57 L   (4.30-5.90)  m/uL


 


Hgb  11.0 L   (13.0-17.5)  gm/dL


 


Hct  33.0 L   (39.0-53.0)  %


 


Sodium   134 L  (137-145)  mmol/L


 


Glucose   142 H  (74-99)  mg/dL


 


Total Protein   5.9 L  (6.3-8.2)  g/dL


 


Albumin   3.1 L  (3.5-5.0)  g/dL








 Microbiology - Last 24 Hours (Table)











 09/07/17 15:07 Anaerobic Culture - Preliminary





 Lung - Left 


 


 09/07/17 15:07 Gram Stain - Preliminary





 Lung - Left Tissue Culture - Preliminary














Assessment and Plan


Plan: 


The patient was seen and examined.  I agree with the above assessment and plan.

  His chest x-ray reveals subcutaneous emphysema but no obvious pneumothorax.  

His chest tube appears to be in proper position.  He continues to have 

significant crepitus but denies shortness of breath.  The dressing was changed 

in his left anterior chest wound.  We will keep the chest tube to suction for 

now.

## 2017-09-10 NOTE — P.PN
Subjective


Principal diagnosis: 


Left lung mass, mediastinal adenopathy.  Hypertension, hyperlipidemia, 

hypothyroid, peripheral vascular disease with history of bifemoral bypass in 

the 1980s, history of mild coronary artery disease, chronic obstructive 

pulmonary disease, and severe gastroesophageal reflux disease.








Patient was reevaluated today on 9/9/2017, he is postoperative day #2, status 

post left thoracoscopy biopsy of mediastinal nodes and wedge resection of mass 

from lingula.  Patient is doing well, however he developed significant 

subcutaneous emphysema mostly because for some reason the chest tube was taken 

off suction and placed on waterseal.  No evidence of pneumothorax, but there is 

evidence of significant subcu emphysema on the chest x-ray today.  Patient 

denies any shortness of breath, but seems to be quite bothered with the 

puffiness and swelling in the chest wall and in the face.  Seems to be 

extending to the right orbital area.  Clinically the patient is feeling better, 

chest tube remains on suction, no cough no wheezing no shortness of breath, no 

chest pain.  Swelling seems to be improving.





Patient was reevaluated today on 9/10/2017, his postoperative day #3, status 

post left thoracoscopy, biopsy of mediastinal nodes and wedge resection of mass 

from lingula.  Patient developed subcutaneous emphysema postoperatively, but no 

evidence of pneumothorax.  His chest tube remains on suction.  The chest wall 

incision that was made by Dr. Gomez to relieve the subcutaneous emphysema 

seems to be intact.  Patient remains swollen, edematous, and there is 

significant subcutaneous emphysema noted.  Continues to have some leak in the 

[place].  However the lung seems to be quite expanded.  No cough no wheezing 

no shortness of breath.  Chest x-ray was reviewed.  CBC is relatively normal 

basic metabolic profile is normal.








Objective





- Vital Signs


Vital signs: 


 Vital Signs











Temp  97.2 F L  09/10/17 08:00


 


Pulse  78   09/10/17 09:11


 


Resp  16   09/10/17 09:11


 


BP  111/54   09/10/17 08:00


 


Pulse Ox  96   09/10/17 09:01








 Intake & Output











 09/09/17 09/10/17 09/10/17





 18:59 06:59 18:59


 


Intake Total 720  360


 


Output Total 4714 850 600


 


Balance -1444 -850 -240


 


Weight  83 kg 


 


Intake:   


 


  Oral 720  360


 


Output:   


 


  Chest Tube Drainage 64 50 


 


    Chest Tube Left Lateral 64 50 





    Chest   


 


  Urine 2100 800 600


 


    Straight   600


 


Other:   


 


  Voiding Method Indwelling Catheter Indwelling Catheter Indwelling Catheter














- Exam





Physical Exam: Revealed an 84-year-old white male in no distress, significant 

subcutaneous emphysema noted in the face and in the anterior chest wall area.


HEENT:[Neck is supple.] [No neck masses.] [No thyromegaly.] [No JVD.]


Chest: Diminished breath sounds at the bases, left sided tube thoracostomy is 

noted.]  Upper cutaneous emphysema noted in the right and left chest wall 

anteriorly.  The incision in the left anterior chest wall seems to be intact 

and clean.


Cardiac Exam: [Normal S1 and S2, no S3 gallop, no murmur.]


Abdomen: [Soft, nontender,  no megaly, no rebound, no guarding, normal bowel 

sounds.]


Extremities: [No clubbing, no edema, no cyanosis.]


Neurological Exam: [No focal neurologic deficit.]








- Labs


CBC & Chem 7: 


 09/10/17 05:31





 09/10/17 05:31


Labs: 


 Abnormal Lab Results - Last 24 Hours (Table)











  09/09/17 09/10/17 09/10/17 Range/Units





  06:11 05:31 05:31 


 


RBC   3.57 L   (4.30-5.90)  m/uL


 


Hgb   11.0 L   (13.0-17.5)  gm/dL


 


Hct   33.0 L   (39.0-53.0)  %


 


Sodium    134 L  (137-145)  mmol/L


 


Glucose    142 H  (74-99)  mg/dL


 


Hemoglobin A1c  7.4 H    (4.2-6.1)  %


 


Total Protein    5.9 L  (6.3-8.2)  g/dL


 


Albumin    3.1 L  (3.5-5.0)  g/dL








 Microbiology - Last 24 Hours (Table)











 09/07/17 15:07 Anaerobic Culture - Preliminary





 Lung - Left 


 


 09/07/17 15:07 Gram Stain - Preliminary





 Lung - Left Tissue Culture - Preliminary














Assessment and Plan


Plan: 





Impression: Status post left thoracoscopy, biopsy of mediastinal node and wedge 

resection of mass from left lingula.  Postoperative day #3


Pathology from the lung tissue and the mediastinal node is pending.


2.  Iatrogenic subcutaneous emphysema, however this will definitely improve 

knowing now that chest tube is placed back on suction.  Patient was reassured 

about the findings and he seems to be quite concerned.





2 multiple comorbidities including history of coronary artery disease, 

hypertension, hypothyroidism, and hyperlipidemia.  History of suspected COPD 

considering his smoking history.





Recommendation: Continue incentive spirometry, bronchodilators, ambulation, 

await results of the wedge resection and mediastinal node biopsy.  We'll 

continue to follow.  Chest tube should remain on suction.  Continues to have 

some leak, hence the chest tube will remain, patient will be reevaluated in 

a.m.  Hopefully the pathology report will also be available in the next 24 

hours.


Time with Patient: Less than 30

## 2017-09-11 LAB
BASOPHILS # BLD AUTO: 0 K/UL (ref 0–0.2)
BASOPHILS NFR BLD AUTO: 0 %
CH: 31.5
CHCM: 34.2
EOSINOPHIL # BLD AUTO: 0.2 K/UL (ref 0–0.7)
EOSINOPHIL NFR BLD AUTO: 4 %
ERYTHROCYTE [DISTWIDTH] IN BLOOD BY AUTOMATED COUNT: 3.61 M/UL (ref 4.3–5.9)
ERYTHROCYTE [DISTWIDTH] IN BLOOD: 14.4 % (ref 11.5–15.5)
HCT VFR BLD AUTO: 33.4 % (ref 39–53)
HDW: 2.62
HGB BLD-MCNC: 11 GM/DL (ref 13–17.5)
LUC NFR BLD AUTO: 3 %
LYMPHOCYTES # SPEC AUTO: 1 K/UL (ref 1–4.8)
LYMPHOCYTES NFR SPEC AUTO: 28 %
MCH RBC QN AUTO: 30.4 PG (ref 25–35)
MCHC RBC AUTO-ENTMCNC: 32.8 G/DL (ref 31–37)
MCV RBC AUTO: 92.6 FL (ref 80–100)
MONOCYTES # BLD AUTO: 0.3 K/UL (ref 0–1)
MONOCYTES NFR BLD AUTO: 7 %
NEUTROPHILS # BLD AUTO: 2.1 K/UL (ref 1.3–7.7)
NEUTROPHILS NFR BLD AUTO: 58 %
WBC # BLD AUTO: 0.1 10*3/UL
WBC # BLD AUTO: 3.7 K/UL (ref 3.8–10.6)
WBC (PEROX): 3.94

## 2017-09-11 RX ADMIN — CARVEDILOL SCH MG: 12.5 TABLET, FILM COATED ORAL at 06:44

## 2017-09-11 RX ADMIN — HEPARIN SODIUM SCH UNIT: 5000 INJECTION, SOLUTION INTRAVENOUS; SUBCUTANEOUS at 23:55

## 2017-09-11 RX ADMIN — PREDNISOLONE ACETATE SCH DROPS: 10 SUSPENSION/ DROPS OPHTHALMIC at 20:52

## 2017-09-11 RX ADMIN — HYDROCODONE BITARTRATE AND ACETAMINOPHEN PRN EACH: 5; 325 TABLET ORAL at 20:52

## 2017-09-11 RX ADMIN — DOCUSATE SODIUM SCH MG: 100 CAPSULE, LIQUID FILLED ORAL at 08:13

## 2017-09-11 RX ADMIN — LEVOTHYROXINE SODIUM SCH MCG: 0.12 TABLET ORAL at 06:44

## 2017-09-11 RX ADMIN — ATORVASTATIN CALCIUM SCH MG: 20 TABLET, FILM COATED ORAL at 20:53

## 2017-09-11 RX ADMIN — IPRATROPIUM BROMIDE AND ALBUTEROL SULFATE SCH ML: .5; 3 SOLUTION RESPIRATORY (INHALATION) at 08:06

## 2017-09-11 RX ADMIN — TEMAZEPAM PRN MG: 15 CAPSULE ORAL at 22:55

## 2017-09-11 RX ADMIN — IPRATROPIUM BROMIDE AND ALBUTEROL SULFATE SCH ML: .5; 3 SOLUTION RESPIRATORY (INHALATION) at 15:51

## 2017-09-11 RX ADMIN — IPRATROPIUM BROMIDE AND ALBUTEROL SULFATE SCH ML: .5; 3 SOLUTION RESPIRATORY (INHALATION) at 12:23

## 2017-09-11 RX ADMIN — HEPARIN SODIUM SCH UNIT: 5000 INJECTION, SOLUTION INTRAVENOUS; SUBCUTANEOUS at 08:13

## 2017-09-11 RX ADMIN — ASPIRIN 325 MG ORAL TABLET SCH MG: 325 PILL ORAL at 08:12

## 2017-09-11 RX ADMIN — TAMSULOSIN HYDROCHLORIDE SCH MG: 0.4 CAPSULE ORAL at 17:39

## 2017-09-11 RX ADMIN — PANTOPRAZOLE SODIUM SCH MG: 40 TABLET, DELAYED RELEASE ORAL at 06:44

## 2017-09-11 RX ADMIN — ONDANSETRON PRN MG: 2 INJECTION INTRAMUSCULAR; INTRAVENOUS at 16:40

## 2017-09-11 RX ADMIN — IPRATROPIUM BROMIDE AND ALBUTEROL SULFATE SCH ML: .5; 3 SOLUTION RESPIRATORY (INHALATION) at 20:23

## 2017-09-11 RX ADMIN — CARVEDILOL SCH MG: 12.5 TABLET, FILM COATED ORAL at 17:39

## 2017-09-11 RX ADMIN — FLUDROCORTISONE ACETATE SCH MG: 0.1 TABLET ORAL at 08:12

## 2017-09-11 RX ADMIN — HEPARIN SODIUM SCH UNIT: 5000 INJECTION, SOLUTION INTRAVENOUS; SUBCUTANEOUS at 17:39

## 2017-09-11 RX ADMIN — HYDROCODONE BITARTRATE AND ACETAMINOPHEN PRN EACH: 5; 325 TABLET ORAL at 09:53

## 2017-09-11 RX ADMIN — ISOSORBIDE MONONITRATE SCH MG: 30 TABLET, EXTENDED RELEASE ORAL at 08:12

## 2017-09-11 RX ADMIN — PREDNISOLONE ACETATE SCH DROPS: 10 SUSPENSION/ DROPS OPHTHALMIC at 08:13

## 2017-09-11 NOTE — P.PN
<Rox Azul - Last Filed: 09/11/17 10:08>





Subjective


Principal diagnosis: 





Left lung mass, mediastinal adenopathy.  Hypertension, hyperlipidemia, 

hypothyroid, peripheral vascular disease with history of bifemoral bypass in 

the 1980s, history of mild coronary artery disease, chronic obstructive 

pulmonary disease, and severe gastroesophageal reflux disease.





POD #4 left thoracoscopy, biopsy of mediastinal lymph node, wedge resection of 

mass from left lingula





Patient's currently sitting up in the chair in no acute distress.  Denies pain, 

shortness of breath.  Still has scattered subcu emphysema to his chest and back

, bilateral upper x-rays, and right eye.





Objective





- Vital Signs


Vital signs: 


 Vital Signs











Temp  96.8 F L  09/11/17 04:00


 


Pulse  66   09/11/17 08:19


 


Resp  18   09/11/17 04:00


 


BP  129/60   09/11/17 04:00


 


Pulse Ox  92 L  09/11/17 04:00








 Intake & Output











 09/10/17 09/11/17 09/11/17





 18:59 06:59 18:59


 


Intake Total 600  240


 


Output Total 800 715 


 


Balance -200 -715 240


 


Weight  83.7 kg 


 


Intake:   


 


  Oral 600  240


 


Output:   


 


  Chest Tube Drainage  40 


 


    Chest Tube Left Lateral  40 





    Chest   


 


  Urine 800 675 


 


    Straight 600  


 


Other:   


 


  Voiding Method Urinal Urinal 


 


  # Voids 1  














- Constitutional


General appearance: Present: cooperative, no acute distress





- Respiratory


Details: 





Lungs sounds diminished bilaterally.  Respirations even, nonlabored.  Currently 

on 2 L nasal cannula saturation 92%.  Able to achieve 750 mL on his incentive 

spirometer.  Left pleural chest tube to -20 cm wall suction, drained 10 mL of 

serous fluid overnight, 40 mL last 24 hours.  No air leak present.





- Cardiovascular


Details: 





S1, S2 present.  Regular rate and rhythm, normal sinus rhythm on telemetry.  No 

edema present.  SCDs present.





- Gastrointestinal


Gastrointestinal Comment(s): 





Abdomen soft, nontender, nondistended.  Active bowel sounds 4 quadrants.  

Tolerating diet.





- Genitourinary


Genitourinary Comment(s): 





Voiding clear, yellow urine per urinal.





- Neurologic


Neurologic: Present: CNII-XII intact





- Musculoskeletal


Musculoskeletal: Present: gait normal, strength equal bilaterally





- Psychiatric


Psychiatric: Present: A&O x's 3, appropriate affect, intact judgment & insight





- Allied health notes


Allied health notes reviewed: nursing





- Labs


CBC & Chem 7: 


 09/11/17 05:25





 09/10/17 05:31


Labs: 


 Abnormal Lab Results - Last 24 Hours (Table)











  09/11/17 Range/Units





  05:25 


 


WBC  3.7 L  (3.8-10.6)  k/uL


 


RBC  3.61 L  (4.30-5.90)  m/uL


 


Hgb  11.0 L  (13.0-17.5)  gm/dL


 


Hct  33.4 L  (39.0-53.0)  %








 Microbiology - Last 24 Hours (Table)











 09/07/17 15:07 Gram Stain - Preliminary





 Lung - Left Tissue Culture - Preliminary














- Imaging and Cardiology


Chest x-ray: report reviewed, image reviewed





Assessment and Plan


(1) COPD (chronic obstructive pulmonary disease)


Status: Acute   





(2) GERD (gastroesophageal reflux disease)


Status: Acute   





(3) History of coronary artery disease


Status: Acute   





(4) Hyperlipidemia


Status: Acute   





(5) Hypertension


Status: Acute   





(6) Mass of left lung


Status: Acute   





(7) Mediastinal lymphadenopathy


Status: Acute   





(8) Subcutaneous emphysema, postoperative


Status: Acute   





(9) Hypothyroid


Status: Acute   


Plan: 





1.  Left pleural chest tube placed to waterseal this morning.  We will continue 

to monitor for air leak.


2.  Will monitor daily chest x-rays.


3.  Wean O2 as tolerated.  Encourage incentive spirometer use.


4.  Increase activity, ambulate in hallway.


5.  GI/DVT prophylaxis.


6.  Medical comorbidities to be managed by primary care service.


7.  Pathology pending, we'll monitor for results.


8.  Pain management per ordered medications.


9.  More recommendations as patient progresses.


Time with Patient: Greater than 30





<Drew Miller - Last Filed: 09/12/17 11:24>





Objective





- Vital Signs


Vital signs: 


 Vital Signs











Temp  96.3 F L  09/12/17 08:00


 


Pulse  101 H  09/12/17 08:47


 


Resp  16   09/12/17 08:47


 


BP  134/63   09/12/17 08:00


 


Pulse Ox  95   09/12/17 08:47








 Intake & Output











 09/11/17 09/12/17 09/12/17





 18:59 06:59 18:59


 


Intake Total 660  380


 


Output Total 500 550 0


 


Balance 160 -550 380


 


Weight  84.5 kg 


 


Intake:   


 


  Oral 660  380


 


Output:   


 


  Chest Tube Drainage 0 0 0


 


    Chest Tube Left Lateral 0 0 0





    Chest   


 


  Urine 500 550 


 


Other:   


 


  Voiding Method  Urinal 














- Labs


CBC & Chem 7: 


 09/11/17 05:25





 09/10/17 05:31


Labs: 


 Microbiology - Last 24 Hours (Table)











 09/07/17 15:07 Anaerobic Culture - Final





 Lung - Left 


 


 09/07/17 15:07 Gram Stain - Final





 Lung - Left Tissue Culture - Final














Assessment and Plan


Plan: 


The patient was seen and examined.  I agree with the above assessment and plan.

  There is no obvious air leak in his chest tube today.  We will place it to 

waterseal.  He continues to have significant subcutaneous emphysema on clinical 

examination but it appears to have improved since yesterday.

## 2017-09-11 NOTE — P.PN
Subjective


This is a pleasant 84-year-old  gentleman patient of Dr. Myah Walsh underlying history of COPD hypertension hyperlipidemia hyperglycemia BPH 

and mild CAD based on heart cath in 2014 requiring on the medical management, 

was noted to have a spot in the left lung based on an abnormal CT of the chest 

and abnormal PET/CT, on 9/72017 Patient underwent left thoracoscopy, biopsy of 

mediastinal lymph wedge resection of mass from left lingula, workup done in the 

past few weeks include a PET/CT performed 07/29/2017 showing hypermetabolic 

uptake in the left upper lobe nodule 12 mm pleural-based in the anterior 

mediastinum.  Patient currently has thoracostomy tube, and developed 

subcutaneous emphysema overnight, with upper chest swelling swollen face and 

swollen right eyelid.  Patient currently stable, no active problems at this 

time no shortness of breath no chest pain no  or GI complaints








9/9: Subcutaneous emphysema has stabilized and clinically is better, still has 

swollen eyelid from subcutaneous emphysema, no shortness of breath no chest 

pain.  Patient complains of constipation.  No melena and hematochezia no 

shortness of breath.  Thoracostomy chest wall tube to suction





9/10: Patient remains on chest tube with to suction, subcutaneous emphysema 

still noted on the face right orbit and chest, no new complaints today no chest 

pain no nausea no vomiting or diarrhea. Chest x-ray only shows minimal left 

pleural effusion, no pneumothorax for catheter is to be discontinued today 

patient on Flomax





9/11: Patient was seen and evaluated today.  Patient still noted to have 

subcutaneous emphysema noted to right chest and right side of face and orbit 

area.  Left pleural chest tube was placed to waterseal, patient continues to 

use his incentive spirometer.  Repeat chest x-ray shows increasingly confluent 

left basilar and midlung opacities, trace left pleural effusion, and extensive 

subcutaneous emphysema.  Pathology results are still pending. 





Objective





- Vital Signs


Vital signs: 


 Vital Signs











Temp  96.8 F L  09/11/17 04:00


 


Pulse  66   09/11/17 08:19


 


Resp  18   09/11/17 04:00


 


BP  129/60   09/11/17 04:00


 


Pulse Ox  92 L  09/11/17 04:00








 Intake & Output











 09/10/17 09/11/17 09/11/17





 18:59 06:59 18:59


 


Intake Total 600  240


 


Output Total 800 715 


 


Balance -200 -715 240


 


Weight  83.7 kg 


 


Intake:   


 


  Oral 600  240


 


Output:   


 


  Chest Tube Drainage  40 


 


    Chest Tube Left Lateral  40 





    Chest   


 


  Urine 800 675 


 


    Straight 600  


 


Other:   


 


  Voiding Method Urinal Urinal 


 


  # Voids 1  














- Exam


- Constitutional


General appearance: Present: cooperative, no acute distress





- EENT


Eyes: Present: anicteric sclerae, EOMI, PERRLA, dentition normal, normal 

appearance


ENT: Present: NA/AT, normal oropharynx





- Neck


Neck: Present: normal ROM





- Respiratory


Respiratory: bilateral: CTA, negative: diminished, dullness, prolonged 

expiration, prolonged inspiration





- Cardiovascular


Rhythm: regular


Heart sounds: normal: S1, S2


Abnormal Heart Sounds: Absent: systolic murmur, diastolic murmur, rub, S3 Gallop

, S4 Gallop, click, other





- Gastrointestinal


General gastrointestinal: Present: normal bowel sounds, soft





- Integumentary


Integumentary: Present: normal, normal turgor





- Neurologic


Neurologic: Present: CNII-XII intact





- Musculoskeletal


Musculoskeletal: Present: gait normal, strength equal bilaterally





- Psychiatric


Psychiatric: Present: A&O x's 3, appropriate affect, intact judgment & insight











- Labs


CBC & Chem 7: 


 09/11/17 05:25





 09/10/17 05:31


Labs: 


 Abnormal Lab Results - Last 24 Hours (Table)











  09/11/17 Range/Units





  05:25 


 


WBC  3.7 L  (3.8-10.6)  k/uL


 


RBC  3.61 L  (4.30-5.90)  m/uL


 


Hgb  11.0 L  (13.0-17.5)  gm/dL


 


Hct  33.4 L  (39.0-53.0)  %








 Microbiology - Last 24 Hours (Table)











 09/07/17 15:07 Gram Stain - Preliminary





 Lung - Left Tissue Culture - Preliminary














Assessment and Plan


Plan: 


1. Status post thoracotomy and wedge resection of lingular mass.  Procedure 

performed 09/07/2017 pathology sent, and still pending. Followed by Dr. Gomez 

and pulmonary medicine. 





2. Subcutaneous emphysema with facial bloating and right eyelid swelling. 

Monitor for resolution and progression. Currently has left chest tube water 

seal.





3.  CAD. Known to have coronary disease from previous heart cath with medical 

management, stable, asymptomatic on Imdur 30 daily, Coreg, Lipitor, Norvasc asa 

325 daily





4. COPD: No Current symptoms. Continue patient on updrafts of albuterol/

ipratropium.





5. Hypertension: Continue Carvedilol 12.5 mg g twice a day, Amlodipine 10 mg 

daily, and Imdur 30 mg daily





6. Hyperlipidemia: On atorvastatin 20 mg daily.





7. Hypothyroidism: Was on Synthroid 100 g daily, currently uncontrolled TSH of 

11, levothyroxine increased 125 g daily. 





8. Severe GERD: On Protonix 40 mg daily





9. Chronic supplementation with Florinef 0.05 mg daily possibly for autonomic 

dysfunction rather than adrenal insufficiency.





12.  Mild anemia possibly blood loss related, monitor for decline, CBC is 

stable. 





13. CKD stage II. Nephrotoxins will be avoided, will continue to monitor CMP 

closely.





14. Impaired tandem blood sugars, hemoglobin A1c 7.4.





15. Transient hypoxemic respiratory failure with hypoxemia. Currently on 2-3 L 

nasal cannula, patient to continue on incentive spirometry. 





GI prophylaxis Protonix





DVT prophylaxis with subcutaneous heparin and GIL hose





The above impression and plan of care have been discussed and directed by 

signing physician. Becca Lindsay nurse practitioner acting as scribe for 

signing physician.

## 2017-09-11 NOTE — XR
EXAMINATION TYPE: XR chest 1V portable

 

DATE OF EXAM: 9/11/2017

 

COMPARISON: 9/11/2017

 

HISTORY: Shortness of breath. Postoperative that with wedge resection.

 

TECHNIQUE: Single frontal view of the chest is obtained.

 

FINDINGS:  Extensive subcutaneous emphysema is present throughout the chest, left greater than right.
 Left thoracostomy tube is unchanged in position with no sizable residual pneumothorax. Blunting of t
he left costophrenic angle remains, likely related to trace left pleural effusion. Cardiac silhouette
 is again enlarged. Scattered areas of left basilar airspace disease have become more confluent and l
ikely relate to subsegmental atelectasis.

 

IMPRESSION:  

1. Increasingly confluent left basilar and midlung opacities, likely relating to multifocal subsegmen
chuckie atelectasis.

2.  Trace left pleural effusion.

3. Extensive subcutaneous emphysema and unchanged left thoracostomy tube with no sizable residual lef
t pneumothorax.

## 2017-09-11 NOTE — P.PN
Subjective





Progress note dated 09/11/2017





This is a 84-year-old male postop day #4 status post left thoracoscopy biopsy 

of mediastinal nodes a wedge resection of a mass from the lingula.  The patient 

developed some subcutaneous emphysema postoperatively.  No evidence of 

pneumothorax.  Patient seemed be doing relatively well.  Denies any chest pain 

chest discomfort shortness of breath cough wheezing or any complaints for that 

matter.  The patient still has evidence of subcutaneous emphysema on chest x-

ray.  Other than that the patient seems reasonably stable.  He has a history of 

hypertension hyperlipidemia hypothyroidism peripheral vascular disease and 

status post bifemoral bypass in the 1980s.  And also has a history of CAD COPD 

and GERD.





Objective





- Vital Signs


Vital signs: 


 Vital Signs











Temp  95.9 F L  09/11/17 11:46


 


Pulse  64   09/11/17 12:36


 


Resp  18   09/11/17 11:46


 


BP  142/65   09/11/17 11:46


 


Pulse Ox  95   09/11/17 11:46








 Intake & Output











 09/10/17 09/11/17 09/11/17





 18:59 06:59 18:59


 


Intake Total 600  240


 


Output Total 800 715 0


 


Balance -200 -715 240


 


Weight  83.7 kg 


 


Intake:   


 


  Oral 600  240


 


Output:   


 


  Chest Tube Drainage  40 0


 


    Chest Tube Left Lateral  40 0





    Chest   


 


  Urine 800 675 


 


    Straight 600  


 


Other:   


 


  Voiding Method Urinal Urinal 


 


  # Voids 1  














- Exam





No acute distress, oriented 3.





H EENT examination is grossly unremarkable.  Mucous membranes are moist.  No 

oral lesions.





Neck supple.  Full range of motion.  No adenopathy or thyromegaly.  Neck veins 

are flat.





Cardio vascular examination reveals regular rhythm rate.  S1-S2 normal.  No S3-

S4.  No murmur appreciated.





Lungs reveal diminished breath sounds throughout.  Multiple breath sounds are 

relatively clear.  Evidence of palpable subcutaneous emphysema on the left 

side.  No wheezes.  No rhonchi.





Abdomen soft bowel sounds are heard.





Extremities are intact.  No cyanosis clubbing or edema.





Skin without rash.





Neurologic examination is brief but nonfocal.





- Labs


CBC & Chem 7: 


 09/11/17 05:25





 09/10/17 05:31


Labs: 


 Abnormal Lab Results - Last 24 Hours (Table)











  09/11/17 Range/Units





  05:25 


 


WBC  3.7 L  (3.8-10.6)  k/uL


 


RBC  3.61 L  (4.30-5.90)  m/uL


 


Hgb  11.0 L  (13.0-17.5)  gm/dL


 


Hct  33.4 L  (39.0-53.0)  %








 Microbiology - Last 24 Hours (Table)











 09/07/17 15:07 Gram Stain - Preliminary





 Lung - Left Tissue Culture - Preliminary














Assessment and Plan


Plan: 





Plan dated 09/11/2017





This patient is postop day #4 status post thoracoscopy and biopsy of 

mediastinal lymph nodes a wedge resection of the mass in the left lingula.  The 

patient's pathology earlier today was still pending.  The patient did develop 

some subcutaneous emphysema.  He seems stable.  Respiratory status hemodynamics 

are all stable.  His other medical problems increased CAD hypertension 

hypothyroidism hyperlipidemia all seem to be relatively safe stable.  He does 

have suspected COPD from tobacco use.


Time with Patient: Less than 30

## 2017-09-12 RX ADMIN — HYDROCODONE BITARTRATE AND ACETAMINOPHEN PRN EACH: 5; 325 TABLET ORAL at 21:22

## 2017-09-12 RX ADMIN — HEPARIN SODIUM SCH UNIT: 5000 INJECTION, SOLUTION INTRAVENOUS; SUBCUTANEOUS at 16:59

## 2017-09-12 RX ADMIN — CARVEDILOL SCH MG: 12.5 TABLET, FILM COATED ORAL at 16:59

## 2017-09-12 RX ADMIN — ISOSORBIDE MONONITRATE SCH MG: 30 TABLET, EXTENDED RELEASE ORAL at 08:32

## 2017-09-12 RX ADMIN — IPRATROPIUM BROMIDE AND ALBUTEROL SULFATE SCH ML: .5; 3 SOLUTION RESPIRATORY (INHALATION) at 08:47

## 2017-09-12 RX ADMIN — IPRATROPIUM BROMIDE AND ALBUTEROL SULFATE SCH ML: .5; 3 SOLUTION RESPIRATORY (INHALATION) at 16:22

## 2017-09-12 RX ADMIN — ATORVASTATIN CALCIUM SCH MG: 20 TABLET, FILM COATED ORAL at 21:22

## 2017-09-12 RX ADMIN — IPRATROPIUM BROMIDE AND ALBUTEROL SULFATE SCH ML: .5; 3 SOLUTION RESPIRATORY (INHALATION) at 20:31

## 2017-09-12 RX ADMIN — CARVEDILOL SCH MG: 12.5 TABLET, FILM COATED ORAL at 06:32

## 2017-09-12 RX ADMIN — FLUDROCORTISONE ACETATE SCH MG: 0.1 TABLET ORAL at 08:32

## 2017-09-12 RX ADMIN — PREDNISOLONE ACETATE SCH DROPS: 10 SUSPENSION/ DROPS OPHTHALMIC at 08:32

## 2017-09-12 RX ADMIN — HYDROCODONE BITARTRATE AND ACETAMINOPHEN PRN EACH: 5; 325 TABLET ORAL at 06:36

## 2017-09-12 RX ADMIN — TAMSULOSIN HYDROCHLORIDE SCH MG: 0.4 CAPSULE ORAL at 16:59

## 2017-09-12 RX ADMIN — HEPARIN SODIUM SCH UNIT: 5000 INJECTION, SOLUTION INTRAVENOUS; SUBCUTANEOUS at 23:47

## 2017-09-12 RX ADMIN — ONDANSETRON PRN MG: 2 INJECTION INTRAMUSCULAR; INTRAVENOUS at 16:59

## 2017-09-12 RX ADMIN — HEPARIN SODIUM SCH UNIT: 5000 INJECTION, SOLUTION INTRAVENOUS; SUBCUTANEOUS at 08:32

## 2017-09-12 RX ADMIN — LEVOTHYROXINE SODIUM SCH MCG: 0.12 TABLET ORAL at 06:31

## 2017-09-12 RX ADMIN — IPRATROPIUM BROMIDE AND ALBUTEROL SULFATE SCH ML: .5; 3 SOLUTION RESPIRATORY (INHALATION) at 11:52

## 2017-09-12 RX ADMIN — HYDROCODONE BITARTRATE AND ACETAMINOPHEN PRN EACH: 5; 325 TABLET ORAL at 16:59

## 2017-09-12 RX ADMIN — ASPIRIN 325 MG ORAL TABLET SCH MG: 325 PILL ORAL at 08:32

## 2017-09-12 RX ADMIN — DOCUSATE SODIUM SCH MG: 100 CAPSULE, LIQUID FILLED ORAL at 08:32

## 2017-09-12 RX ADMIN — PREDNISOLONE ACETATE SCH DROPS: 10 SUSPENSION/ DROPS OPHTHALMIC at 21:22

## 2017-09-12 RX ADMIN — TEMAZEPAM PRN MG: 15 CAPSULE ORAL at 21:22

## 2017-09-12 RX ADMIN — PANTOPRAZOLE SODIUM SCH MG: 40 TABLET, DELAYED RELEASE ORAL at 06:32

## 2017-09-12 NOTE — P.PN
<Casey Sandoval - Last Filed: 09/12/17 09:10>





Subjective


Principal diagnosis: 





Left lung mass, mediastinal adenopathy.  Hypertension, hyperlipidemia, 

hypothyroid, peripheral vascular disease with history of bifemoral bypass in 

the 1980s, history of mild coronary artery disease, chronic obstructive 

pulmonary disease, and severe gastroesophageal reflux disease.





POD #5, Left thoracoscopy, biopsy of mediastinal lymph node, wedge resection of 

mass from left lingula





The patient is sitting up to the bedside chair, he is alert and orientated 3.  

No acute distress.  There is some scattered subcutaneous emphysema to his 

anterior chest and back, neck, bilateral upper extremities, right face and eye.

  He states that he "feels the subcutaneous air has gone down a lot today".





Objective





- Vital Signs


Vital signs: 


 Vital Signs











Temp  96.3 F L  09/12/17 08:00


 


Pulse  80   09/12/17 08:00


 


Resp  18   09/12/17 08:00


 


BP  134/63   09/12/17 08:00


 


Pulse Ox  90 L  09/12/17 08:00








 Intake & Output











 09/11/17 09/12/17 09/12/17





 18:59 06:59 18:59


 


Intake Total 660  


 


Output Total 500 550 0


 


Balance 160 -550 0


 


Weight  84.5 kg 


 


Intake:   


 


  Oral 660  


 


Output:   


 


  Chest Tube Drainage 0 0 0


 


    Chest Tube Left Lateral 0 0 0





    Chest   


 


  Urine 500 550 


 


Other:   


 


  Voiding Method  Urinal 














- Constitutional


General appearance: Present: cooperative, no acute distress, obese





- EENT


Eyes: Present: PERRLA


ENT: Present: hearing grossly normal





- Respiratory


Details: 





Lung sounds are essentially clear throughout, diminished to his bilateral 

bases.  Respirations are even and unlabored.  Oxygen saturations are 92% on 

room air.  He is achieving 1250 mL on his incentive spirometry.  Left pleural 

chest tube remains intact and to continuous low wall suction at -20 cm H2O.  

His evacuating thin serosanguineous drainage.





- Cardiovascular


Details: 





Regular rhythm and rate.  S1 and S2 present, negative for S3, gallop or murmur.

  No edema present.  Remote telemetry showing normal sinus rhythm heart rate 

78.  Knee-high sequential compression devices in place to his bilateral lower 

extremities.





- Gastrointestinal


Gastrointestinal Comment(s): 





Abdomen is soft, nontender and nondistended.  Bowel sounds present all 4 

abdominal quadrants.  Tolerating oral intake.





- Genitourinary


Genitourinary Comment(s): 





Clear yellow urine.  Voiding per urinal.





- Integumentary


Integumentary Comment(s): 





scattered subcutaneous emphysema to his anterior chest and back, neck, 

bilateral arms, right face and eye resolving.





- Musculoskeletal


Musculoskeletal: Present: gait normal, strength equal bilaterally





- Psychiatric


Psychiatric: Present: A&O x's 3, appropriate affect, intact judgment & insight





- Allied health notes


Allied health notes reviewed: nursing





- Labs


CBC & Chem 7: 


 09/11/17 05:25





 09/10/17 05:31


Labs: 


 Microbiology - Last 24 Hours (Table)











 09/07/17 15:07 Anaerobic Culture - Final





 Lung - Left 


 


 09/07/17 15:07 Gram Stain - Final





 Lung - Left Tissue Culture - Final














- Imaging and Cardiology


Chest x-ray: report reviewed, image reviewed





Assessment and Plan


(1) Hypertension


Status: Acute   





(2) Hyperlipidemia


Status: Acute   





(3) Mass of left lung


Status: Acute   





(4) Mediastinal lymphadenopathy


Status: Acute   





(5) COPD (chronic obstructive pulmonary disease)


Status: Acute   





(6) History of coronary artery disease


Status: Acute   





(7) GERD (gastroesophageal reflux disease)


Status: Acute   





(8) Hypothyroid


Status: Acute   





(9) Subcutaneous emphysema, postoperative


Status: Acute   


Plan: 





1.  We will clamp his left pleural chest tube this morning 4 hours.  If the 

subcu emphysema does not get worse we will discontinue his left pleural chest 

tube today.


2.  Monitor daily chest x-ray.


3.  Increase activity as tolerated.


4.  Encourage use of his incentive spirometry every hour while awake.


5.  Pulmonary management per Dr. Glasgow's recommendations.


6.  DVT and GI prophylaxis in place.


7.  Medical management recommendations per primary care.


8.  Pathology results remain pending.  Preliminary cultures negative for any 

growth.


9.  Pain management per ordered medications.


10. Further recommendations as patient progresses.


Time with Patient: Greater than 30





<Drew Miller - Last Filed: 09/12/17 11:12>





Objective





- Vital Signs


Vital signs: 


 Vital Signs











Temp  96.3 F L  09/12/17 08:00


 


Pulse  101 H  09/12/17 08:47


 


Resp  16   09/12/17 08:47


 


BP  134/63   09/12/17 08:00


 


Pulse Ox  95   09/12/17 08:47








 Intake & Output











 09/11/17 09/12/17 09/12/17





 18:59 06:59 18:59


 


Intake Total 660  380


 


Output Total 500 550 0


 


Balance 160 -550 380


 


Weight  84.5 kg 


 


Intake:   


 


  Oral 660  380


 


Output:   


 


  Chest Tube Drainage 0 0 0


 


    Chest Tube Left Lateral 0 0 0





    Chest   


 


  Urine 500 550 


 


Other:   


 


  Voiding Method  Urinal 














- Labs


CBC & Chem 7: 


 09/11/17 05:25





 09/10/17 05:31


Labs: 


 Microbiology - Last 24 Hours (Table)











 09/07/17 15:07 Anaerobic Culture - Final





 Lung - Left 


 


 09/07/17 15:07 Gram Stain - Final





 Lung - Left Tissue Culture - Final














Assessment and Plan


Plan: 


The patient was seen and examined.  I agree with the above assessment and plan.

  His chest x-ray this morning reveals decreased subcutaneus emphysema and no 

obvious pneumothorax.  On examination his crepitus appears to be improved.  

There is no air leak in his chest tube.  We did clamp his chest tube this 

morning.  We will recheck a chest x-ray this afternoon.  In the meantime we 

will monitor for worsening subcutaneous emphysema.

## 2017-09-12 NOTE — P.PN
Subjective


This is a pleasant 84-year-old  gentleman patient of Dr. Myah Walsh underlying history of COPD hypertension hyperlipidemia hyperglycemia BPH 

and mild CAD based on heart cath in 2014 requiring on the medical management, 

was noted to have a spot in the left lung based on an abnormal CT of the chest 

and abnormal PET/CT, on 9/72017 Patient underwent left thoracoscopy, biopsy of 

mediastinal lymph wedge resection of mass from left lingula, workup done in the 

past few weeks include a PET/CT performed 07/29/2017 showing hypermetabolic 

uptake in the left upper lobe nodule 12 mm pleural-based in the anterior 

mediastinum.  Patient currently has thoracostomy tube, and developed 

subcutaneous emphysema overnight, with upper chest swelling swollen face and 

swollen right eyelid.  Patient currently stable, no active problems at this 

time no shortness of breath no chest pain no  or GI complaints








9/9: Subcutaneous emphysema has stabilized and clinically is better, still has 

swollen eyelid from subcutaneous emphysema, no shortness of breath no chest 

pain.  Patient complains of constipation.  No melena and hematochezia no 

shortness of breath.  Thoracostomy chest wall tube to suction





9/10: Patient remains on chest tube with to suction, subcutaneous emphysema 

still noted on the face right orbit and chest, no new complaints today no chest 

pain no nausea no vomiting or diarrhea. Chest x-ray only shows minimal left 

pleural effusion, no pneumothorax for catheter is to be discontinued today 

patient on Flomax





9/11: Patient was seen and evaluated today.  Patient still noted to have 

subcutaneous emphysema noted to right chest and right side of face and orbit 

area.  Left pleural chest tube was placed to waterseal, patient continues to 

use his incentive spirometer.  Repeat chest x-ray shows increasingly confluent 

left basilar and midlung opacities, trace left pleural effusion, and extensive 

subcutaneous emphysema.  Pathology results are still pending. 





9/12: Patient was noted to sitting up in his bedside chair, family at the 

bedside.  Subcutaneous emphysema has improved significantly, although he still 

does have some scattered subcutaneous emphysema to his anterior chest, back, 

neck, upper extremities, right face and eye.  His chest tube was clamped this 

morning with possibilities to pull it this afternoon.  Repeat chest x-ray 

showed left-sided chest tube is stable, again extensive subcutaneous emphysema 

but no evidence for pneumothorax or pleural effusion.  Pathology results are 

still pending. 





Objective





- Vital Signs


Vital signs: 


 Vital Signs











Temp  96.3 F L  09/12/17 08:00


 


Pulse  101 H  09/12/17 08:47


 


Resp  16   09/12/17 08:47


 


BP  134/63   09/12/17 08:00


 


Pulse Ox  95   09/12/17 08:47








 Intake & Output











 09/11/17 09/12/17 09/12/17





 18:59 06:59 18:59


 


Intake Total 660  380


 


Output Total 500 550 0


 


Balance 160 -550 380


 


Weight  84.5 kg 


 


Intake:   


 


  Oral 660  380


 


Output:   


 


  Chest Tube Drainage 0 0 0


 


    Chest Tube Left Lateral 0 0 0





    Chest   


 


  Urine 500 550 


 


Other:   


 


  Voiding Method  Urinal 














- Exam


- Constitutional


General appearance: Present: cooperative, no acute distress





- EENT


Eyes: Present: anicteric sclerae, EOMI, PERRLA, dentition normal, normal 

appearance


ENT: Present: NA/AT, normal oropharynx





- Neck


Neck: Present: normal ROM





- Respiratory


Respiratory: bilateral: CTA, negative: diminished, dullness, prolonged 

expiration, prolonged inspiration





- Cardiovascular


Rhythm: regular


Heart sounds: normal: S1, S2


Abnormal Heart Sounds: Absent: systolic murmur, diastolic murmur, rub, S3 Gallop

, S4 Gallop, click, other





- Gastrointestinal


General gastrointestinal: Present: normal bowel sounds, soft





- Integumentary


Integumentary: Present: normal, normal turgor





- Neurologic


Neurologic: Present: CNII-XII intact





- Musculoskeletal


Musculoskeletal: Present: gait normal, strength equal bilaterally





- Psychiatric


Psychiatric: Present: A&O x's 3, appropriate affect, intact judgment & insight











- Labs


CBC & Chem 7: 


 09/11/17 05:25





 09/10/17 05:31


Labs: 


 Microbiology - Last 24 Hours (Table)











 09/07/17 15:07 Anaerobic Culture - Final





 Lung - Left 


 


 09/07/17 15:07 Gram Stain - Final





 Lung - Left Tissue Culture - Final














Assessment and Plan


Plan: 


1. Status post thoracotomy and wedge resection of lingular mass.  Procedure 

performed 09/07/2017 pathology sent, and still pending. Followed by Dr. Gomez 

and pulmonary medicine. 





2. Subcutaneous emphysema with facial bloating and right eyelid swelling. 

Monitor for resolution and progression, subcutaneous emphysema has improved 

significantly today. Currently has left chest tube has been clamped with 

possibilities to DC this afternoon.





3.  CAD. Known to have coronary disease from previous heart cath with medical 

management, stable, asymptomatic on Imdur 30 daily, Coreg, Lipitor, Norvasc asa 

325 daily





4. COPD: No Current symptoms. Continue patient on updrafts of albuterol/

ipratropium.





5. Hypertension: Continue Carvedilol 12.5 mg g twice a day, Amlodipine 10 mg 

daily, and Imdur 30 mg daily





6. Hyperlipidemia: On atorvastatin 20 mg daily.





7. Hypothyroidism: Was on Synthroid 100 g daily, currently uncontrolled TSH of 

11, levothyroxine increased 125 g daily. 





8. Severe GERD: On Protonix 40 mg daily





9. Chronic supplementation with Florinef 0.05 mg daily possibly for autonomic 

dysfunction rather than adrenal insufficiency.





12.  Mild anemia possibly blood loss related, monitor for decline, CBC is 

stable. 





13. CKD stage II. Nephrotoxins will be avoided, will continue to monitor CMP 

closely.





14. Impaired tandem blood sugars, hemoglobin A1c 7.4.





15. Transient hypoxemic respiratory failure with hypoxemia. Currently on 2-3 L 

nasal cannula, patient to continue on incentive spirometry. 





GI prophylaxis Protonix





DVT prophylaxis with subcutaneous heparin and GLI hose





The above impression and plan of care have been discussed and directed by 

signing physician. Becca Lindsay nurse practitioner acting as scribe for 

signing physician.

## 2017-09-12 NOTE — XR
EXAMINATION TYPE: XR chest 1V portable

 

DATE OF EXAM: 9/12/2017

 

COMPARISON: Prior chest x-ray 9/11/2017

 

HISTORY: Status post thoracotomy, chest tube

 

TECHNIQUE: Single frontal view of the chest is obtained.

 

FINDINGS:  Left-sided chest tube is stable. There is extensive subcutaneous emphysema. No evident pne
umothorax or pleural effusion. Heart size is stable.

 

IMPRESSION:  No significant interval change. Stable findings.

## 2017-09-12 NOTE — XR
EXAMINATION TYPE: XR chest 1V portable

 

DATE OF EXAM: 9/12/2017

 

COMPARISON: Prior chest x-ray same date earlier time

 

HISTORY: Status post left thoracotomy

 

TECHNIQUE: Single frontal view of the chest is obtained.

 

FINDINGS:  Left-sided chest tube is stable. Extensive subcutaneous emphysema is again noted. No evide
nt pneumothorax or pleural effusion. Cardiac mediastinal silhouette, pulmonary vascularity and willis a
re stable. Patient is rotated.

 

IMPRESSION:  Stable postprocedural changes.

## 2017-09-12 NOTE — PN
PROGRESS NOTE



An 84-year-old male who is postop day #5 status post thoracoscopic biopsy of

mediastinal lymph nodes and a wedge resection of a mass in the lingula.  The patient

unfortunately developed some left-sided subcutaneous emphysema.  No evidence of

pneumothorax.  The patient seems to be doing relatively well.  Denies having any chest

pain, chest discomfort.  He is not short of breath.  Not coughing up any phlegm or

blood.  He is actually doing very well.  He has a history of hypertension,

hyperlipidemia, hypothyroidism, peripheral vascular occlusive disease, status post bi-

femoral bypass and has a history of underlying CAD and GERD.



Current vital signs include a temperature of 96.1, heart rate 60, respiratory rate 18,

blood pressure 127/60, room air saturation 93%.  Mean arterial pressure 82.  Appears in

no acute distress.

HEENT:  Grossly unremarkable.  Mucous membranes are moist.  No oral lesions.

NECK:  Supple.  Full range of motion.  No adenopathy or thyromegaly.  Neck veins are

flat.  Cardiovascular reveals regular rhythm and rate.  Heart rate is 60.  S1, S2

normal.  No S3, S4 or murmur.  Lungs reveal a few scattered rhonchi.  Breath sounds are

diminished.  There is prolongation.  No crackles.  No wheezes.

ABDOMEN:  Soft.  Bowel sounds are heard.

EXTREMITIES:  Intact.  No cyanosis, clubbing or edema.

SKIN:  Without rash.

NEUROLOGIC:  Nonfocal.



LABORATORY DATA:

Not yet available.  Nothing from today as yet.  Most recent chest x-ray was done on

September 12 and shows no significant interval change when compared to a chest x-ray

done on 09/11.  The left-sided chest tube is in place.  There is extensive subcutaneous

emphysema on the left side of the chest.



Microbiology is all negative.



ASSESSMENT:

1. Postoperative day #5 status post video-assisted thoracoscopic surgery, mediastinal

    lymph node biopsy and wedge resection of a mass in the lingula.

2. Subcutaneous emphysema.

3. Coronary artery disease.

4. Hypertension.

5. Hypothyroidism.

6. Hyperlipidemia.

7. Chronic obstructive pulmonary disease.



PLAN:

Will check pathology in the computer.  As of yesterday, it was not there.  No

additional recommendations are made.  Will continue to follow closely.  Will encourage

deep breathing, coughing, clearing of secretions.  Prognosis is guarded.





MMODL / IJN: [id number] / Job#: [id number]

## 2017-09-13 RX ADMIN — PREDNISOLONE ACETATE SCH DROPS: 10 SUSPENSION/ DROPS OPHTHALMIC at 20:09

## 2017-09-13 RX ADMIN — HYDROCODONE BITARTRATE AND ACETAMINOPHEN PRN EACH: 5; 325 TABLET ORAL at 17:28

## 2017-09-13 RX ADMIN — HEPARIN SODIUM SCH UNIT: 5000 INJECTION, SOLUTION INTRAVENOUS; SUBCUTANEOUS at 16:44

## 2017-09-13 RX ADMIN — TAMSULOSIN HYDROCHLORIDE SCH MG: 0.4 CAPSULE ORAL at 16:44

## 2017-09-13 RX ADMIN — IPRATROPIUM BROMIDE AND ALBUTEROL SULFATE SCH ML: .5; 3 SOLUTION RESPIRATORY (INHALATION) at 20:43

## 2017-09-13 RX ADMIN — FLUDROCORTISONE ACETATE SCH MG: 0.1 TABLET ORAL at 08:24

## 2017-09-13 RX ADMIN — HEPARIN SODIUM SCH UNIT: 5000 INJECTION, SOLUTION INTRAVENOUS; SUBCUTANEOUS at 23:36

## 2017-09-13 RX ADMIN — IPRATROPIUM BROMIDE AND ALBUTEROL SULFATE SCH ML: .5; 3 SOLUTION RESPIRATORY (INHALATION) at 08:07

## 2017-09-13 RX ADMIN — ISOSORBIDE MONONITRATE SCH MG: 30 TABLET, EXTENDED RELEASE ORAL at 08:24

## 2017-09-13 RX ADMIN — PREDNISOLONE ACETATE SCH DROPS: 10 SUSPENSION/ DROPS OPHTHALMIC at 08:25

## 2017-09-13 RX ADMIN — TEMAZEPAM PRN MG: 15 CAPSULE ORAL at 21:57

## 2017-09-13 RX ADMIN — IPRATROPIUM BROMIDE AND ALBUTEROL SULFATE SCH ML: .5; 3 SOLUTION RESPIRATORY (INHALATION) at 15:46

## 2017-09-13 RX ADMIN — ASPIRIN 325 MG ORAL TABLET SCH MG: 325 PILL ORAL at 08:24

## 2017-09-13 RX ADMIN — HYDROCODONE BITARTRATE AND ACETAMINOPHEN PRN EACH: 5; 325 TABLET ORAL at 08:30

## 2017-09-13 RX ADMIN — IPRATROPIUM BROMIDE AND ALBUTEROL SULFATE SCH: .5; 3 SOLUTION RESPIRATORY (INHALATION) at 12:24

## 2017-09-13 RX ADMIN — HYDROCODONE BITARTRATE AND ACETAMINOPHEN PRN EACH: 5; 325 TABLET ORAL at 21:57

## 2017-09-13 RX ADMIN — CARVEDILOL SCH MG: 12.5 TABLET, FILM COATED ORAL at 16:44

## 2017-09-13 RX ADMIN — LEVOTHYROXINE SODIUM SCH MCG: 0.12 TABLET ORAL at 06:41

## 2017-09-13 RX ADMIN — HEPARIN SODIUM SCH UNIT: 5000 INJECTION, SOLUTION INTRAVENOUS; SUBCUTANEOUS at 08:24

## 2017-09-13 RX ADMIN — HYDROCODONE BITARTRATE AND ACETAMINOPHEN PRN EACH: 5; 325 TABLET ORAL at 03:37

## 2017-09-13 RX ADMIN — CARVEDILOL SCH MG: 12.5 TABLET, FILM COATED ORAL at 06:41

## 2017-09-13 RX ADMIN — PANTOPRAZOLE SODIUM SCH MG: 40 TABLET, DELAYED RELEASE ORAL at 06:41

## 2017-09-13 RX ADMIN — ATORVASTATIN CALCIUM SCH MG: 20 TABLET, FILM COATED ORAL at 20:09

## 2017-09-13 RX ADMIN — DOCUSATE SODIUM SCH MG: 100 CAPSULE, LIQUID FILLED ORAL at 08:24

## 2017-09-13 NOTE — P.PN
<Rox Azul - Last Filed: 09/13/17 08:17>





Subjective


Principal diagnosis: 





Left lung mass, mediastinal adenopathy.  Hypertension, hyperlipidemia, 

hypothyroid, peripheral vascular disease with history of bifemoral bypass in 

the 1980s, history of mild coronary artery disease, chronic obstructive 

pulmonary disease, and severe gastroesophageal reflux disease.





POD #6 left thoracoscopy, biopsy of mediastinal lymph node, wedge resection of 

mass from left lingula











Patient's currently sitting up in the chair in no acute distress.  Denies pain, 

shortness of breath.  Still has subcu emphysema to his left arm and chest, 

swelling in the right eye has gone down significantly.  We did clamp the patient

's chest tube yesterday for a few hours with slight increase in subcu emphysema

, chest tube was unclamped





Objective





- Vital Signs


Vital signs: 


 Vital Signs











Temp  96.9 F L  09/13/17 03:33


 


Pulse  72   09/13/17 08:08


 


Resp  18   09/13/17 03:47


 


BP  120/86   09/13/17 03:33


 


Pulse Ox  92 L  09/13/17 03:34








 Intake & Output











 09/12/17 09/13/17 09/13/17





 18:59 06:59 18:59


 


Intake Total 1154 10 


 


Output Total 1210 400 275


 


Balance -56 -390 -275


 


Intake:   


 


  IV  10 


 


    0.9% NS FLUSH 10 mL  10 


 


  Oral 1154  


 


Output:   


 


  Chest Tube Drainage 10 0 


 


    Chest Tube Left Lateral 10 0 





    Chest   


 


  Urine 1200 400 275


 


Other:   


 


  Voiding Method  Urinal 


 


  # Voids  1 














- Constitutional


General appearance: Present: cooperative, no acute distress





- Respiratory


Details: 





Lungs sounds diminished bilaterally.  Respirations even, nonlabored.  Currently 

on 2 L nasal cannula with oxygen saturation 92%.  Able to achieve 1000 mL on 

his incentive spirometry.  Left pleural chest tube to waterseal, no drainage 

noted overnight, 50 mL of serous drainage in the last 24 hours.





- Cardiovascular


Details: 





S1, S2 present.  Regular rate and rhythm, normal sinus rhythm on telemetry.  

SCDs present.





- Gastrointestinal


Gastrointestinal Comment(s): 





Abdomen soft, nontender, nondistended.  Active bowel sounds 4 quadrants.  

Tolerating diet.





- Genitourinary


Genitourinary Comment(s): 





Continues to void clear, yellow urine.





- Neurologic


Neurologic: Present: CNII-XII intact





- Musculoskeletal


Musculoskeletal: Present: gait normal, strength equal bilaterally





- Psychiatric


Psychiatric: Present: A&O x's 3, appropriate affect, intact judgment & insight





- Allied health notes


Allied health notes reviewed: nursing





- Labs


CBC & Chem 7: 


 09/11/17 05:25





 09/10/17 05:31





- Imaging and Cardiology


Chest x-ray: report reviewed, image reviewed





Assessment and Plan


(1) COPD (chronic obstructive pulmonary disease)


Status: Acute   





(2) GERD (gastroesophageal reflux disease)


Status: Acute   





(3) History of coronary artery disease


Status: Acute   





(4) Hyperlipidemia


Status: Acute   





(5) Hypertension


Status: Acute   





(6) Mass of left lung


Status: Acute   





(7) Mediastinal lymphadenopathy


Status: Acute   





(8) Subcutaneous emphysema, postoperative


Status: Acute   





(9) Hypothyroid


Status: Acute   


Plan: 





1.  Left pleural chest tube clamped again this morning.  Will reassess in a 

couple of hours.  It is possible that chest tube will be discontinued today


2.  Will monitor daily chest x-rays.


3.  Wean O2 as tolerated.  Encourage incentive spirometer use.


4.  Increase activity, ambulate in hallway.


5.  GI/DVT prophylaxis.


6.  Medical comorbidities to be managed by primary care service.


7.  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 

lingula specimen.


8.  Pain management per ordered medications.


9.  More recommendations as patient progresses.


Time with Patient: Greater than 30





<Drew Miller - Last Filed: 09/13/17 12:40>





Objective





- Vital Signs


Vital signs: 


 Vital Signs











Temp  96.9 F L  09/13/17 12:00


 


Pulse  78   09/13/17 12:00


 


Resp  16   09/13/17 12:00


 


BP  116/65   09/13/17 12:00


 


Pulse Ox  93 L  09/13/17 12:00








 Intake & Output











 09/12/17 09/13/17 09/13/17





 18:59 06:59 18:59


 


Intake Total 1154 10 360


 


Output Total 1210 400 275


 


Balance -56 -390 85


 


Intake:   


 


  IV  10 


 


    0.9% NS FLUSH 10 mL  10 


 


  Oral 1154  360


 


Output:   


 


  Chest Tube Drainage 10 0 


 


    Chest Tube Left Lateral 10 0 





    Chest   


 


  Urine 1200 400 275


 


Other:   


 


  Voiding Method  Urinal Urinal


 


  # Voids  1 














- Labs


CBC & Chem 7: 


 09/11/17 05:25





 09/10/17 05:31





Assessment and Plan


Plan: 


The patient was seen and examined.  I agree with the above assessment and plan.

  His chest tube was placed back to water seal yesterday afternoon when his 

subcutaneous emphysema appeared to worsen with the tube clamped.  Today's chest 

x-ray reveals no obvious left pneumothorax.  From a clinical standpoint, his 

subcutaneous emphysema appears improved.  There is no air leak noted in the 

atrium.  We will plan on clamping the tube again early tomorrow morning with 

hopes of removing it if there is no complication.  His pathology is positive 

for squamous cell carcinoma.

## 2017-09-13 NOTE — XR
EXAMINATION TYPE: XR chest 1V portable

 

DATE OF EXAM: 9/13/2017

 

COMPARISON: Prior chest x-ray 9/12/2017

 

HISTORY: Status post thoracotomy

 

TECHNIQUE: Single frontal view of the chest is obtained.

 

FINDINGS:  The sided chest tube remains in place, subcutaneous emphysema is extensive. No evident pne
umothorax or sizable effusion. Cardiac mediastinal silhouette, pulmonary vascularity and willis are sta
ble. Pleural parenchymal changes not significantly changed, there may be underlying atelectasis. Subc
utaneous emphysema may limit the exam. Overlying cardiac leads are present.

 

IMPRESSION:  Similar findings to prior exam. Follow-up recommended.

## 2017-09-13 NOTE — P.PN
Subjective


Principal diagnosis: 





Left lung mass, mediastinal adenopathy.





Patient was reevaluated today on 9/9/2017, he is postoperative day #2, status 

post left thoracoscopy biopsy of mediastinal nodes and wedge resection of mass 

from lingula.  Patient is doing well, however he developed significant 

subcutaneous emphysema mostly because for some reason the chest tube was taken 

off suction and placed on waterseal.  No evidence of pneumothorax, but there is 

evidence of significant subcu emphysema on the chest x-ray today.  Patient 

denies any shortness of breath, but seems to be quite bothered with the 

puffiness and swelling in the chest wall and in the face.  Seems to be 

extending to the right orbital area.  Clinically the patient is feeling better, 

chest tube remains on suction, no cough no wheezing no shortness of breath, no 

chest pain.  Swelling seems to be improving.





Patient was reevaluated today on 9/10/2017, his postoperative day #3, status 

post left thoracoscopy, biopsy of mediastinal nodes and wedge resection of mass 

from lingula.  Patient developed subcutaneous emphysema postoperatively, but no 

evidence of pneumothorax.  His chest tube remains on suction.  The chest wall 

incision that was made by Dr. Gomez to relieve the subcutaneous emphysema 

seems to be intact.  Patient remains swollen, edematous, and there is 

significant subcutaneous emphysema noted.  Continues to have some leak in the 

[place].  However the lung seems to be quite expanded.  No cough no wheezing 

no shortness of breath.  Chest x-ray was reviewed.  CBC is relatively normal 

basic metabolic profile is normal.





The patient is seen again today 09/13/2017 in follow-up on the selective care 

unit.  This is postoperative day #6.  He is status post left thoracoscopy and 

biopsy of mediastinal nodes with wedge resection of the mass in the lingula.  

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  His left chest tube 

remains in place.  Currently clamped.  There is no evidence of air leak.  The 

patient has had ongoing issues with subcutaneous emphysema.  He is awake and 

alert sitting up in the chair at the bedside.  He denies any worsening 

shortness of breath, cough or congestion.  Today's chest x-ray reveals no 

evidence of pneumothorax or sizable effusion.





Objective





- Vital Signs


Vital signs: 


 Vital Signs











Temp  96.9 F L  09/13/17 03:33


 


Pulse  72   09/13/17 08:19


 


Resp  18   09/13/17 03:47


 


BP  120/86   09/13/17 03:33


 


Pulse Ox  92 L  09/13/17 03:34








 Intake & Output











 09/12/17 09/13/17 09/13/17





 18:59 06:59 18:59


 


Intake Total 1154 10 360


 


Output Total 1210 400 275


 


Balance -56 -390 85


 


Intake:   


 


  IV  10 


 


    0.9% NS FLUSH 10 mL  10 


 


  Oral 1154  360


 


Output:   


 


  Chest Tube Drainage 10 0 


 


    Chest Tube Left Lateral 10 0 





    Chest   


 


  Urine 1200 400 275


 


Other:   


 


  Voiding Method  Urinal 


 


  # Voids  1 














- Exam





GENERAL EXAM: Alert, comfortable in no apparent distress.


HEAD: Normocephalic.


EYES: Normal reaction of pupils, equal size.


NOSE: Clear with pink turbinates.


THROAT: No erythema or exudates.


NECK: No masses, no JVD.


CHEST: No chest wall deformity.  Left-sided chest tube remains in place.


LUNGS: Equal air entry with few scattered rhonchi in the left..


CVS: S1 and S2 normal with no audible murmurs, regular rhythm.


ABDOMEN: No hepatosplenomegaly, normal bowel sounds, no guarding or rigidity.


SPINE: No scoliosis or deformity


SKIN: Palpable subcutaneous emphysema of the upper chest, face and neck


CENTRAL NERVOUS SYSTEM: No focal deficits, tone is normal in all 4 extremities.


Extremities: There is trace peripheral edema.  No clubbing, no cyanosis.  

Peripheral pulses are intact.





- Labs


CBC & Chem 7: 


 09/11/17 05:25





 09/10/17 05:31





Assessment and Plan


Plan: 





Impression: 





#1 Status post left thoracoscopy, biopsy of mediastinal node and wedge 

resection of mass from left lingula.  Postoperative day #6


Pathology from the lung reveals squamous cell carcinoma.  There is also non-

caseating granulomas suspicious for sarcoidosis.





#2 Iatrogenic subcutaneous emphysema, however this will definitely improve 

knowing now that chest tube is placed back on suction.  Patient was reassured 

about the findings and he seems to be quite concerned.





#3 Coronary artery disease, 





#4 Hypertension, 





#5 Hypothyroidism, and 





#6 Hyperlipidemia. 





#7 History of suspected COPD considering his smoking history.





Plan:





the patient was seen and evaluated by Dr. Glasgow.  His chest x-ray and labs were 

reviewed.  His pathology report was noted and is positive for squamous cell 

carcinoma along with suspicion for sarcoidosis.  We'll consult oncology.  He'll 

have a repeat chest x-ray in the a.m.  Possible removal of chest tube then.  He'

ll follow-up with Dr. Glasgow in our office as well.

## 2017-09-13 NOTE — P.PN
Subjective


This is a pleasant 84-year-old  gentleman patient of Dr. Myah Walsh underlying history of COPD hypertension hyperlipidemia hyperglycemia BPH 

and mild CAD based on heart cath in 2014 requiring on the medical management, 

was noted to have a spot in the left lung based on an abnormal CT of the chest 

and abnormal PET/CT, on 9/72017 Patient underwent left thoracoscopy, biopsy of 

mediastinal lymph wedge resection of mass from left lingula, workup done in the 

past few weeks include a PET/CT performed 07/29/2017 showing hypermetabolic 

uptake in the left upper lobe nodule 12 mm pleural-based in the anterior 

mediastinum.  Patient currently has thoracostomy tube, and developed 

subcutaneous emphysema overnight, with upper chest swelling swollen face and 

swollen right eyelid.  Patient currently stable, no active problems at this 

time no shortness of breath no chest pain no  or GI complaints








9/9: Subcutaneous emphysema has stabilized and clinically is better, still has 

swollen eyelid from subcutaneous emphysema, no shortness of breath no chest 

pain.  Patient complains of constipation.  No melena and hematochezia no 

shortness of breath.  Thoracostomy chest wall tube to suction





9/10: Patient remains on chest tube with to suction, subcutaneous emphysema 

still noted on the face right orbit and chest, no new complaints today no chest 

pain no nausea no vomiting or diarrhea. Chest x-ray only shows minimal left 

pleural effusion, no pneumothorax for catheter is to be discontinued today 

patient on Flomax





9/11: Patient was seen and evaluated today.  Patient still noted to have 

subcutaneous emphysema noted to right chest and right side of face and orbit 

area.  Left pleural chest tube was placed to waterseal, patient continues to 

use his incentive spirometer.  Repeat chest x-ray shows increasingly confluent 

left basilar and midlung opacities, trace left pleural effusion, and extensive 

subcutaneous emphysema.  Pathology results are still pending. 





9/12: Patient was noted to sitting up in his bedside chair, family at the 

bedside.  Subcutaneous emphysema has improved significantly, although he still 

does have some scattered subcutaneous emphysema to his anterior chest, back, 

neck, upper extremities, right face and eye.  His chest tube was clamped this 

morning with possibilities to pull it this afternoon.  Repeat chest x-ray 

showed left-sided chest tube is stable, again extensive subcutaneous emphysema 

but no evidence for pneumothorax or pleural effusion.  Pathology results are 

still pending. 





9/13: Patients chest tube was clamped yesterday, within a few hours he started 

to have an increase in his subcutaneous emphysema.  Chest tube was unclamped.  

Patient was reevaluated this morning, his chest tube was clamped again per 

pulmonary, plan is to reassess in a few hours and if possible chest tube will 

be discontinued later on today.  Pathology reports were positive for squamous 

cell carcinoma from the left upper lobe lingula specimen.  Oncology consulted.





Objective





- Vital Signs


Vital signs: 


 Vital Signs











Temp  96.9 F L  09/13/17 03:33


 


Pulse  72   09/13/17 08:19


 


Resp  18   09/13/17 03:47


 


BP  120/86   09/13/17 03:33


 


Pulse Ox  92 L  09/13/17 03:34








 Intake & Output











 09/12/17 09/13/17 09/13/17





 18:59 06:59 18:59


 


Intake Total 1154 10 360


 


Output Total 1210 400 275


 


Balance -56 -390 85


 


Intake:   


 


  IV  10 


 


    0.9% NS FLUSH 10 mL  10 


 


  Oral 1154  360


 


Output:   


 


  Chest Tube Drainage 10 0 


 


    Chest Tube Left Lateral 10 0 





    Chest   


 


  Urine 1200 400 275


 


Other:   


 


  Voiding Method  Urinal 


 


  # Voids  1 














- Exam


- Constitutional


General appearance: Present: cooperative, no acute distress





- EENT


Eyes: Present: anicteric sclerae, EOMI, PERRLA, dentition normal, normal 

appearance


ENT: Present: NA/AT, normal oropharynx





- Neck


Neck: Present: normal ROM





- Respiratory


Respiratory: bilateral: CTA, negative: diminished, dullness, prolonged 

expiration, prolonged inspiration





- Cardiovascular


Rhythm: regular


Heart sounds: normal: S1, S2


Abnormal Heart Sounds: Absent: systolic murmur, diastolic murmur, rub, S3 Gallop

, S4 Gallop, click, other





- Gastrointestinal


General gastrointestinal: Present: normal bowel sounds, soft





- Integumentary


Integumentary: Present: normal, normal turgor





- Neurologic


Neurologic: Present: CNII-XII intact





- Musculoskeletal


Musculoskeletal: Present: gait normal, strength equal bilaterally





- Psychiatric


Psychiatric: Present: A&O x's 3, appropriate affect, intact judgment & insight











- Labs


CBC & Chem 7: 


 09/11/17 05:25





 09/10/17 05:31





Assessment and Plan


Plan: 


1. Status post thoracotomy and wedge resection of lingular mass.  Procedure 

performed 09/07/2017 pathology sent, and positive for squamous cell carcinoma.  

Oncology and pulmonary on consult.. 





2. Subcutaneous emphysema with facial bloating and right eyelid swelling. 

Monitor for resolution and progression, subcutaneous emphysema has improved 

significantly today. Currently has left chest tube has been clamped again with 

possibilities to DC this afternoon.





3.  CAD. Known to have coronary disease from previous heart cath with medical 

management, stable, asymptomatic on Imdur 30 daily, Coreg, Lipitor, Norvasc asa 

325 daily





4. COPD: No Current symptoms. Continue patient on updrafts of albuterol/

ipratropium.





5. Hypertension: Continue Carvedilol 12.5 mg g twice a day, Amlodipine 10 mg 

daily, and Imdur 30 mg daily





6. Hyperlipidemia: On atorvastatin 20 mg daily.





7. Hypothyroidism: Was on Synthroid 100 g daily, currently uncontrolled TSH of 

11, levothyroxine increased 125 g daily. 





8. Severe GERD: On Protonix 40 mg daily





9. Chronic supplementation with Florinef 0.05 mg daily possibly for autonomic 

dysfunction rather than adrenal insufficiency.





12.  Mild anemia possibly blood loss related, monitor for decline, CBC is 

stable. 





13. CKD stage II. Nephrotoxins will be avoided, will continue to monitor CMP 

closely.





14. Impaired tandem blood sugars, hemoglobin A1c 7.4.





15. Transient hypoxemic respiratory failure with hypoxemia. Currently on 2-3 L 

nasal cannula, patient to continue on incentive spirometry. 





GI prophylaxis Protonix 40mg QD 





DVT prophylaxis with subcutaneous heparin and GIL hose





The above impression and plan of care have been discussed and directed by 

signing physician. Becca Lindsay nurse practitioner acting as scribe for 

signing physician.

## 2017-09-14 RX ADMIN — HEPARIN SODIUM SCH UNIT: 5000 INJECTION, SOLUTION INTRAVENOUS; SUBCUTANEOUS at 16:14

## 2017-09-14 RX ADMIN — PREDNISOLONE ACETATE SCH DROPS: 10 SUSPENSION/ DROPS OPHTHALMIC at 08:14

## 2017-09-14 RX ADMIN — HYDROCODONE BITARTRATE AND ACETAMINOPHEN PRN EACH: 5; 325 TABLET ORAL at 12:07

## 2017-09-14 RX ADMIN — HYDROCODONE BITARTRATE AND ACETAMINOPHEN PRN EACH: 5; 325 TABLET ORAL at 03:04

## 2017-09-14 RX ADMIN — HYDROCODONE BITARTRATE AND ACETAMINOPHEN PRN EACH: 5; 325 TABLET ORAL at 08:06

## 2017-09-14 RX ADMIN — ASPIRIN 325 MG ORAL TABLET SCH MG: 325 PILL ORAL at 08:09

## 2017-09-14 RX ADMIN — ATORVASTATIN CALCIUM SCH MG: 20 TABLET, FILM COATED ORAL at 19:48

## 2017-09-14 RX ADMIN — IPRATROPIUM BROMIDE AND ALBUTEROL SULFATE SCH ML: .5; 3 SOLUTION RESPIRATORY (INHALATION) at 19:38

## 2017-09-14 RX ADMIN — DOCUSATE SODIUM SCH MG: 100 CAPSULE, LIQUID FILLED ORAL at 08:09

## 2017-09-14 RX ADMIN — IPRATROPIUM BROMIDE AND ALBUTEROL SULFATE SCH ML: .5; 3 SOLUTION RESPIRATORY (INHALATION) at 12:31

## 2017-09-14 RX ADMIN — PANTOPRAZOLE SODIUM SCH MG: 40 TABLET, DELAYED RELEASE ORAL at 06:40

## 2017-09-14 RX ADMIN — CARVEDILOL SCH MG: 12.5 TABLET, FILM COATED ORAL at 06:39

## 2017-09-14 RX ADMIN — LEVOTHYROXINE SODIUM SCH MCG: 0.12 TABLET ORAL at 06:40

## 2017-09-14 RX ADMIN — FLUDROCORTISONE ACETATE SCH MG: 0.1 TABLET ORAL at 08:10

## 2017-09-14 RX ADMIN — IPRATROPIUM BROMIDE AND ALBUTEROL SULFATE SCH ML: .5; 3 SOLUTION RESPIRATORY (INHALATION) at 15:52

## 2017-09-14 RX ADMIN — HYDROCODONE BITARTRATE AND ACETAMINOPHEN PRN EACH: 5; 325 TABLET ORAL at 16:56

## 2017-09-14 RX ADMIN — PREDNISOLONE ACETATE SCH DROPS: 10 SUSPENSION/ DROPS OPHTHALMIC at 19:47

## 2017-09-14 RX ADMIN — TEMAZEPAM PRN MG: 15 CAPSULE ORAL at 21:48

## 2017-09-14 RX ADMIN — HYDROCODONE BITARTRATE AND ACETAMINOPHEN PRN EACH: 5; 325 TABLET ORAL at 21:48

## 2017-09-14 RX ADMIN — ISOSORBIDE MONONITRATE SCH MG: 30 TABLET, EXTENDED RELEASE ORAL at 08:14

## 2017-09-14 RX ADMIN — TAMSULOSIN HYDROCHLORIDE SCH MG: 0.4 CAPSULE ORAL at 16:55

## 2017-09-14 RX ADMIN — HEPARIN SODIUM SCH UNIT: 5000 INJECTION, SOLUTION INTRAVENOUS; SUBCUTANEOUS at 08:18

## 2017-09-14 RX ADMIN — CARVEDILOL SCH MG: 12.5 TABLET, FILM COATED ORAL at 16:55

## 2017-09-14 RX ADMIN — IPRATROPIUM BROMIDE AND ALBUTEROL SULFATE SCH ML: .5; 3 SOLUTION RESPIRATORY (INHALATION) at 08:22

## 2017-09-14 NOTE — P.PN
Subjective


This is a pleasant 84-year-old  gentleman patient of Dr. Myah Walsh underlying history of COPD hypertension hyperlipidemia hyperglycemia BPH 

and mild CAD based on heart cath in 2014 requiring on the medical management, 

was noted to have a spot in the left lung based on an abnormal CT of the chest 

and abnormal PET/CT, on 9/72017 Patient underwent left thoracoscopy, biopsy of 

mediastinal lymph wedge resection of mass from left lingula, workup done in the 

past few weeks include a PET/CT performed 07/29/2017 showing hypermetabolic 

uptake in the left upper lobe nodule 12 mm pleural-based in the anterior 

mediastinum.  Patient currently has thoracostomy tube, and developed 

subcutaneous emphysema overnight, with upper chest swelling swollen face and 

swollen right eyelid.  Patient currently stable, no active problems at this 

time no shortness of breath no chest pain no  or GI complaints








9/9: Subcutaneous emphysema has stabilized and clinically is better, still has 

swollen eyelid from subcutaneous emphysema, no shortness of breath no chest 

pain.  Patient complains of constipation.  No melena and hematochezia no 

shortness of breath.  Thoracostomy chest wall tube to suction





9/10: Patient remains on chest tube with to suction, subcutaneous emphysema 

still noted on the face right orbit and chest, no new complaints today no chest 

pain no nausea no vomiting or diarrhea. Chest x-ray only shows minimal left 

pleural effusion, no pneumothorax for catheter is to be discontinued today 

patient on Flomax





9/11: Patient was seen and evaluated today.  Patient still noted to have 

subcutaneous emphysema noted to right chest and right side of face and orbit 

area.  Left pleural chest tube was placed to waterseal, patient continues to 

use his incentive spirometer.  Repeat chest x-ray shows increasingly confluent 

left basilar and midlung opacities, trace left pleural effusion, and extensive 

subcutaneous emphysema.  Pathology results are still pending. 





9/12: Patient was noted to sitting up in his bedside chair, family at the 

bedside.  Subcutaneous emphysema has improved significantly, although he still 

does have some scattered subcutaneous emphysema to his anterior chest, back, 

neck, upper extremities, right face and eye.  His chest tube was clamped this 

morning with possibilities to pull it this afternoon.  Repeat chest x-ray 

showed left-sided chest tube is stable, again extensive subcutaneous emphysema 

but no evidence for pneumothorax or pleural effusion.  Pathology results are 

still pending. 





9/13: Patients chest tube was clamped yesterday, within a few hours he started 

to have an increase in his subcutaneous emphysema.  Chest tube was unclamped.  

Patient was reevaluated this morning, his chest tube was clamped again per 

pulmonary, plan is to reassess in a few hours and if possible chest tube will 

be discontinued later on today.  Pathology reports were positive for squamous 

cell carcinoma from the left upper lobe lingula specimen.  Oncology consulted.





9/14: Patient was seen and evaluated today, he is sitting up in the chair 

resting comfortably.  His chest tube was again clamped yesterday, he again 

started to develop some subcutaneous emphysema, chest tube was unclamped.  

Chest tube has been clamped since 3 a.m. today with no increase in subcutaneous 

emphysema, possibility to discontinue the chest tube today per pulmonary if no 

other complications arise.  Overall his subcutaneous emphysema is improving 

significantly.  Pathology reports are positive for squamous cell carcinoma, 

oncology is on consult.





Objective





- Vital Signs


Vital signs: 


 Vital Signs











Temp  98.1 F   09/14/17 03:02


 


Pulse  69   09/14/17 08:33


 


Resp  18   09/14/17 03:55


 


BP  117/58   09/14/17 03:02


 


Pulse Ox  93 L  09/14/17 08:22








 Intake & Output











 09/13/17 09/14/17 09/14/17





 18:59 06:59 18:59


 


Intake Total 1400  


 


Output Total 275 825 


 


Balance 1125 -825 


 


Weight 84.5 kg 83.7 kg 


 


Intake:   


 


  Oral 1400  


 


Output:   


 


  Chest Tube Drainage  25 


 


    Chest Tube Left Lateral  25 





    Chest   


 


  Urine 275 800 


 


Other:   


 


  Voiding Method Urinal Urinal 


 


  # Voids  1 














- Exam


- Constitutional


General appearance: Present: cooperative, no acute distress





- EENT


Eyes: Present: anicteric sclerae, EOMI, PERRLA, dentition normal, normal 

appearance


ENT: Present: NA/AT, normal oropharynx





- Neck


Neck: Present: normal ROM





- Respiratory


Respiratory: bilateral: CTA, negative: diminished, dullness, prolonged 

expiration, prolonged inspiration





- Cardiovascular


Rhythm: regular


Heart sounds: normal: S1, S2


Abnormal Heart Sounds: Absent: systolic murmur, diastolic murmur, rub, S3 Gallop

, S4 Gallop, click, other





- Gastrointestinal


General gastrointestinal: Present: normal bowel sounds, soft





- Integumentary


Integumentary: Present: normal, normal turgor





- Neurologic


Neurologic: Present: CNII-XII intact





- Musculoskeletal


Musculoskeletal: Present: gait normal, strength equal bilaterally





- Psychiatric


Psychiatric: Present: A&O x's 3, appropriate affect, intact judgment & insight











- Labs


CBC & Chem 7: 


 09/11/17 05:25





 09/10/17 05:31





Assessment and Plan


Plan: 


1. Status post thoracotomy and wedge resection of lingular mass.  Procedure 

performed 09/07/2017 pathology sent, and positive for squamous cell carcinoma.  

Oncology and pulmonary on consult. 





2. Subcutaneous emphysema with facial bloating and right eyelid swelling. 

Monitor for resolution and progression, subcutaneous emphysema has improved 

significantly. Currently has left chest tube has been clamped again with 

possibilities to DC this afternoon.





3.  CAD. Known to have coronary disease from previous heart cath with medical 

management, stable, asymptomatic on Imdur 30 daily, Coreg, Lipitor, Norvasc asa 

325 daily





4. COPD: No Current symptoms. Continue patient on updrafts of albuterol/

ipratropium.





5. Hypertension: Continue Carvedilol 12.5 mg g twice a day, Amlodipine 10 mg 

daily, and Imdur 30 mg daily





6. Hyperlipidemia: On atorvastatin 20 mg daily.





7. Hypothyroidism: Was on Synthroid 100 g daily, currently uncontrolled TSH of 

11, levothyroxine increased 125 g daily. 





8. Severe GERD: On Protonix 40 mg daily





9. Chronic supplementation with Florinef 0.05 mg daily possibly for autonomic 

dysfunction rather than adrenal insufficiency.





12.  Mild anemia possibly blood loss related, monitor for decline, CBC is 

stable. 





13. CKD stage II. Nephrotoxins will be avoided, will continue to monitor CMP 

closely.





14. Impaired tandem blood sugars, hemoglobin A1c 7.4.





15. Transient hypoxemic respiratory failure with hypoxemia. Currently on 2-3 L 

nasal cannula, patient to continue on incentive spirometry. 





GI prophylaxis Protonix 40mg QD 





DVT prophylaxis with subcutaneous heparin and GIL hose





The above impression and plan of care have been discussed and directed by 

signing physician. Becca Lindsay nurse practitioner acting as scribe for 

signing physician.

## 2017-09-14 NOTE — P.PN
Addendum entered and electronically signed by Rox Azul, [email or web address]  09/14/17 16:29

: 





Addendum: left pleural chest tube discontinued without incident.  Pt nick well. 

CXR in AM





Original Note:








<Rox Azul - Last Filed: 09/14/17 08:05>





Subjective


Principal diagnosis: 





Left lung mass, mediastinal adenopathy.  Hypertension, hyperlipidemia, 

hypothyroid, peripheral vascular disease with history of bifemoral bypass in 

the 1980s, history of mild coronary artery disease, chronic obstructive 

pulmonary disease, and severe gastroesophageal reflux disease.





POD #7 left thoracoscopy, biopsy of mediastinal lymph node, wedge resection of 

mass from left lingula











Patient's currently sitting up in the chair in no acute distress.  Denies pain, 

shortness of breath.  Still has subcu emphysema to his left arm, swelling in 

the right eye has gone down significantly.  Chest tube has been clamped since 3 

AM with no increase in subcu emphysema.





Objective





- Vital Signs


Vital signs: 


 Vital Signs











Temp  98.1 F   09/14/17 03:02


 


Pulse  66   09/14/17 03:55


 


Resp  18   09/14/17 03:55


 


BP  117/58   09/14/17 03:02


 


Pulse Ox  90 L  09/14/17 03:02








 Intake & Output











 09/13/17 09/14/17 09/14/17





 18:59 06:59 18:59


 


Intake Total 1400  


 


Output Total 275 825 


 


Balance 1125 -825 


 


Weight 84.5 kg 83.7 kg 


 


Intake:   


 


  Oral 1400  


 


Output:   


 


  Chest Tube Drainage  25 


 


    Chest Tube Left Lateral  25 





    Chest   


 


  Urine 275 800 


 


Other:   


 


  Voiding Method Urinal Urinal 


 


  # Voids  1 














- Constitutional


General appearance: Present: cooperative, no acute distress





- Respiratory


Details: 





Lungs sounds diminished bilaterally.  Respirations even, nonlabored.  Currently 

on 2 L nasal cannula with oxygen saturation 90%.  Able to achieve 1250 mL on 

his incentive spirometry.  Left pleural chest tube clamped, no drainage 

overnight.





- Cardiovascular


Details: 





S1, S2 present.  Regular rate and rhythm, normal sinus rhythm on telemetry.





- Gastrointestinal


Gastrointestinal Comment(s): 





Abdomen soft, nontender, nondistended.  Active bowel sounds 4 quadrants.  

Tolerating diet.





- Genitourinary


Genitourinary Comment(s): 





Continues to void clear, yellow urine per urinal.





- Integumentary


Integumentary Comment(s): 





Left lateral chest wall incision well approximated and covered with dry intact 

dressing.





- Neurologic


Neurologic: Present: CNII-XII intact





- Musculoskeletal


Musculoskeletal: Present: gait normal, strength equal bilaterally





- Psychiatric


Psychiatric: Present: A&O x's 3, appropriate affect, intact judgment & insight





- Allied health notes


Allied health notes reviewed: nursing





- Labs


CBC & Chem 7: 


 09/11/17 05:25





 09/10/17 05:31





- Imaging and Cardiology


Chest x-ray: image reviewed





Assessment and Plan


(1) COPD (chronic obstructive pulmonary disease)


Status: Acute   





(2) GERD (gastroesophageal reflux disease)


Status: Acute   





(3) History of coronary artery disease


Status: Acute   





(4) Hyperlipidemia


Status: Acute   





(5) Hypertension


Status: Acute   





(6) Mass of left lung


Status: Acute   





(7) Mediastinal lymphadenopathy


Status: Acute   





(8) Subcutaneous emphysema, postoperative


Status: Acute   





(9) Hypothyroid


Status: Acute   


Plan: 





1.  Left pleural chest tube clamped again this morning.  Likely will 

discontinue chest tube today.


2.  Will monitor daily chest x-rays.


3.  Wean O2 as tolerated.  Encourage incentive spirometer use.


4.  Increase activity, ambulate in hallway.


5.  GI/DVT prophylaxis.


6.  Medical comorbidities to be managed by primary care service.


7.  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 

lingula specimen.  Oncology consulted for recommendations.


8.  Pain management per ordered medications.


9.  More recommendations as patient progresses.





<Drew Miller - Last Filed: 09/15/17 14:05>





Objective





- Vital Signs


Vital signs: 


 Vital Signs











Temp  97 F L  09/15/17 12:00


 


Pulse  90   09/15/17 12:00


 


Resp  20   09/15/17 12:00


 


BP  132/62   09/15/17 12:00


 


Pulse Ox  97   09/15/17 08:00








 Intake & Output











 09/14/17 09/15/17 09/15/17





 18:59 06:59 18:59


 


Intake Total 910  316


 


Output Total  400 


 


Balance 910 -400 316


 


Weight  83.6 kg 


 


Intake:   


 


  IV 10  


 


    0.9% NS FLUSH 10 mL 10  


 


  Oral 900  316


 


Output:   


 


  Urine  400 


 


Other:   


 


  Voiding Method  Urinal 


 


  # Voids 0 1 0














- Labs


CBC & Chem 7: 


 09/11/17 05:25





 09/10/17 05:31





Assessment and Plan


Plan: 


The patient was seen and examined.  I agree with the above assessment and plan.

  His chest tube was clamped early this morning.  There is no change in his 

subcutaneous emphysema.  Follow-up chest x-ray does not reveal any significant 

pneumothorax.  We will remove the chest tube today.  Pathology report was 

consistent with squamous cell carcinoma.  Oncology has been consulted.

## 2017-09-14 NOTE — XR
EXAMINATION TYPE: XR chest 2V

 

DATE OF EXAM: 9/14/2017

 

COMPARISON: 9/13/2017

 

TECHNIQUE: PA and lateral views submitted.

 

HISTORY: Postthoracotomy

 

FINDINGS:

Significant soft tissue emphysema. No sizable pneumothorax. Hyperinflation suggests COPD. Heart size 
stable. Atherosclerotic change aorta. No significant consolidation. Hypertrophic and degenerative jaki
nges spine noted. Evidence of pneumomediastinum noted.

 

IMPRESSION:

1. Extensive subcutaneous emphysema with no sizable pneumothorax

2. Pneumomediastinum

3. COPD.

## 2017-09-14 NOTE — P.CONS
History of Present Illness





- Reason for Consult


Consult date: 09/14/17


squamous cell lung cancer





- History of Present Illness





 Mr. Denny is an 84-year-old  gentleman with multiple medical 

problems.  He was admitted in 3/17 with acute coronary syndrome.  At that time 

CTA of the chest was negative for pulmonary embolus, but incidentally revealed 

a left lingular nodule, as well as mediastinal adenopathy.  CT of the abdomen 

and pelvis in 6/17 confirmed the presence of lower mediastinal adenopathy, as 

well as mildly enlarged pericaval node in the upper abdomen.  The patient had a 

PET scan in 7/17, which showed suspicious uptake in multiple mediastinal nodes, 

left lingular nodule, as well as an upper abdominal peripancreatic nodes.  The 

patient was seen by cardiology thoracic surgery, and underwent thoracoscopic 

wedge biopsy of the liver nodule, and a mediastinal node.  He subsequently 

developed subcu emphysema, and was admitted to the hospital.


     The lingular nodule came back positive for squamous cell carcinoma.  

However the lymph node biopsy showed noncaseating granulomas and was negative 

for malignancy.


     Consult was placed for further evaluation and recommendations





Review of Systems


Constitutional: Reports fatigue


Eyes: denies blurred vision, denies pain


Ears: deny: decreased hearing, ear discharge, earache, tinnitus


Ears, nose, mouth and throat: Denies headache, Denies sore throat


Cardiovascular: Reports as per HPI, Reports chest pain, Reports dyspnea on 

exertion


Respiratory: Reports cough, Reports dyspnea


Gastrointestinal: Denies abdominal pain, Denies diarrhea, Denies nausea, Denies 

vomiting


Genitourinary: Reports as per HPI (No specific complaints)


Musculoskeletal: Denies myalgias


Integumentary: Denies pruritus, Denies rash


Neurological: Reports weakness


Psychiatric: Denies anxiety, Denies depression


Endocrine: Denies fatigue, Denies weight change


Hematologic/Lymphatic: Reports as per HPI, Reports lymphadenopathy





Past Medical History


Past Medical History: Hyperlipidemia, Hypertension, Thyroid Disorder


Additional Past Medical History / Comment(s): MACULAR DEGENERATION- POOR VISION.

, STATES IRREGULAR HEART BEAT.


History of Any Multi-Drug Resistant Organisms: None Reported


Past Surgical History: Appendectomy, Cholecystectomy, Heart Catheterization, 

Orthopedic Surgery


Additional Past Surgical History / Comment(s): Bifemoral bypass grafting (1980's

)


Past Anesthesia/Blood Transfusion Reactions: No Reported Reaction


Smoking Status: Current every day smoker


Past Alcohol Use History: None Reported





- Past Family History


  ** Mother


Family Medical History: CVA/TIA, Hypertension





  ** Father


Family Medical History: Cancer


Additional Family Medical History / Comment(s): CANCER OF LIP





Medications and Allergies


 Home Medications











 Medication  Instructions  Recorded  Confirmed  Type


 


Aspirin EC [Ecotrin] 325 mg PO DAILY 05/28/15 09/07/17 History


 


Isosorbide Mononitrate ER [Imdur] 30 mg PO DAILY 05/28/15 09/07/17 History


 


Omeprazole [PriLOSEC] 20 mg PO DAILY 05/28/15 09/07/17 History


 


Temazepam [Restoril] 15 mg PO HS PRN 05/28/15 09/07/17 History


 


Vits A,C,E/Lutein/Minerals 1 tab PO BID 05/28/15 09/07/17 History





[Ocuvite with Lutein Tablet]    


 


Fludrocortisone [Florinef] 0.05 mg PO DAILY 03/04/17 09/07/17 History


 


Levothyroxine Sodium [Synthroid] 100 mcg PO DAILY 03/04/17 09/07/17 History


 


Simvastatin [Zocor] 40 mg PO HS 03/04/17 09/07/17 History


 


amLODIPine [Norvasc] 10 mg PO DAILY #30 tab 03/05/17 09/07/17 Rx


 


Carvedilol [Coreg] 12.5 mg PO BID 08/29/17 09/07/17 History


 


Ipratropium/Albuterol Sulfate 1 puff INHALATION RT-QID 08/29/17 09/07/17 History





[Combivent Respimat Inhaler]    


 


prednisoLONE ACETATE 1% OPHTH 1 drops RIGHT EYE BID 08/29/17 09/07/17 History





[Pred Forte 1%]    











 Allergies











Allergy/AdvReac Type Severity Reaction Status Date / Time


 


povidone-iodine Allergy Unknown Red Skin Verified 09/07/17 15:53





[From Betadine]     


 


soap [From Betadine] Allergy Unknown Red Skin Verified 09/07/17 15:53














Physical Exam


Vitals: 


 Vital Signs











  Temp Pulse Pulse Pulse Resp BP BP


 


 09/14/17 19:53   70     


 


 09/14/17 19:48    67  68  19  


 


 09/14/17 19:41   70     


 


 09/14/17 16:05   72     


 


 09/14/17 15:53   72     


 


 09/14/17 15:00  97 F L    68  18   123/58


 


 09/14/17 12:44   68     


 


 09/14/17 12:31   65     


 


 09/14/17 11:00  96.8 F L    69  18   102/54


 


 09/14/17 08:33   69     


 


 09/14/17 08:22   65     


 


 09/14/17 07:00  96.4 F L   65  69  18  124/59 


 


 09/14/17 03:55     66  18  


 


 09/14/17 03:02  98.1 F    66  18   117/58


 


 09/14/17 00:00      16  


 


 09/13/17 23:37  98.1 F    70  16   118/56














  Pulse Ox


 


 09/14/17 19:53 


 


 09/14/17 19:48 


 


 09/14/17 19:41 


 


 09/14/17 16:05 


 


 09/14/17 15:53 


 


 09/14/17 15:00  93 L


 


 09/14/17 12:44 


 


 09/14/17 12:31 


 


 09/14/17 11:00  92 L


 


 09/14/17 08:33 


 


 09/14/17 08:22  93 L


 


 09/14/17 07:00  95


 


 09/14/17 03:55 


 


 09/14/17 03:02  90 L


 


 09/14/17 00:00 


 


 09/13/17 23:37 








 Intake and Output











 09/14/17 09/14/17 09/14/17





 06:59 14:59 22:59


 


Intake Total  730 180


 


Output Total 600  


 


Balance -600 730 180


 


Intake:   


 


  IV  10 


 


    0.9% NS FLUSH 10 mL  10 


 


  Oral  720 180


 


Output:   


 


  Urine 600  


 


Other:   


 


  Voiding Method Urinal  Urinal


 


  # Voids   0


 


  Weight 83.7 kg  














- Constitutional


General appearance: no acute distress





- EENT


Eyes: EOMI, PERRLA


ENT: hearing grossly normal, normal oropharynx





- Neck


Neck: no lymphadenopathy


Thyroid: bilateral: normal size





- Respiratory


Respiratory: left: diminished





- Cardiovascular


Rhythm: regular


Heart sounds: normal: S1, S2





- Gastrointestinal


General gastrointestinal: normal bowel sounds, soft





- Integumentary





  Subcu emphysema, left chest wall and left upper flank





- Neurologic


Neurologic: CNII-XII intact





- Musculoskeletal


Musculoskeletal: generalized weakness, strength equal bilaterally





- Psychiatric


Psychiatric: A&O x's 3, appropriate affect





Results


CBC & Chem 7: 


 09/11/17 05:25





 09/10/17 05:31


Comments: 





 PET scan report reviewed


Pathology report reviewed


Chest x-ray: report reviewed


CT scan - abdomen: report reviewed


CT scan - chest: report reviewed


CT scan - pelvis: report reviewed





Assessment and Plan


(1) Squamous cell lung cancer


Narrative/Plan: 


  The patient had been found to have a lingular nodule, as well as mediastinal 

adenopathy, initially in 3/17, with further diagnostic tests revealing 

suspicious findings as noted in the HPI.  The patient thus underwent the above-

mentioned procedure .  The lingular nodule revealed a squamous cell carcinoma 

with invasion into the visceral pleura.  The size of the tumor is about 1.1 cm.


 Interestingly, the biopsied lymph node is negative for malignancy but does 

show noncaseating melanomas which would account for the PET positivity.  Based 

on the radiologic imaging, it was reasonable to assume that the patient likely 

had incurable metastatic disease.  However the finding of granulomatous 

inflammation in the lymph nodes raises the possibility that they may not be 

involved with malignancy, and the lingular nodule is therefore an isolated T1 

tumor.  This would be curable.


   The surgical procedure was done for diagnosis, and is not a definitive 

oncologic surgery .  I'm not sure if the patient would be a candidate for the 

same, if lymph node involvement can be further ruled out.  We will check that 

that with  pulmonology.


   In any case, the patient will need to recover from his acute condition, 

specifically the postop subcutaneous emphysema.  Follow-up in the office for 

further recommendations.


   If the patient is not a candidate for more extensive surgery, and option 

could be further mediastinal staging with endobronchial ultrasound.  If this 

was negative for malignancy, and the patient could potentially have definitive 

radiation to the area of the cancer


  Though he has multiple medical issues, he states that these were reasonably 

well-controlled and up to his admission he was quite functional.


Status: Acute   





(2) Mediastinal lymphadenopathy


Narrative/Plan: 


  This was presumed to be malignant, based on CT and PET appearance.  However 

the lymph node that was biopsied showed granulomatous inflammation, which can 

explain the PET positivity.  This raises the possibility that the mediastinal 

and upper abdominal lymph nodes may not be malignant at all.  In that case, the 

left lingular squamous cell cancer and actually be a curable tumor.


  We will therefore need some additional mediastinal staging.  I will discuss 

with CT surgery, as well as pulmonary medicine asked what would be the best 

modality for the same, whether repeat mediastinoscopy or potentially EBUS.  


  Check ACE level


Status: Acute

## 2017-09-14 NOTE — P.PN
Subjective


Principal diagnosis: 





Left lung mass, mediastinal adenopathy.





Patient was reevaluated today on 9/9/2017, he is postoperative day #2, status 

post left thoracoscopy biopsy of mediastinal nodes and wedge resection of mass 

from lingula.  Patient is doing well, however he developed significant 

subcutaneous emphysema mostly because for some reason the chest tube was taken 

off suction and placed on waterseal.  No evidence of pneumothorax, but there is 

evidence of significant subcu emphysema on the chest x-ray today.  Patient 

denies any shortness of breath, but seems to be quite bothered with the 

puffiness and swelling in the chest wall and in the face.  Seems to be 

extending to the right orbital area.  Clinically the patient is feeling better, 

chest tube remains on suction, no cough no wheezing no shortness of breath, no 

chest pain.  Swelling seems to be improving.





Patient was reevaluated today on 9/10/2017, his postoperative day #3, status 

post left thoracoscopy, biopsy of mediastinal nodes and wedge resection of mass 

from lingula.  Patient developed subcutaneous emphysema postoperatively, but no 

evidence of pneumothorax.  His chest tube remains on suction.  The chest wall 

incision that was made by Dr. Gomez to relieve the subcutaneous emphysema 

seems to be intact.  Patient remains swollen, edematous, and there is 

significant subcutaneous emphysema noted.  Continues to have some leak in the 

[place].  However the lung seems to be quite expanded.  No cough no wheezing 

no shortness of breath.  Chest x-ray was reviewed.  CBC is relatively normal 

basic metabolic profile is normal.





The patient is seen again today 09/13/2017 in follow-up on the selective care 

unit.  This is postoperative day #6.  He is status post left thoracoscopy and 

biopsy of mediastinal nodes with wedge resection of the mass in the lingula.  

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  His left chest tube 

remains in place.  Currently clamped.  There is no evidence of air leak.  The 

patient has had ongoing issues with subcutaneous emphysema.  He is awake and 

alert sitting up in the chair at the bedside.  He denies any worsening 

shortness of breath, cough or congestion.  Today's chest x-ray reveals no 

evidence of pneumothorax or sizable effusion.





The patient is seen again today 09/14/2017 in follow-up on the selective care 

unit.  This is postoperative day #7.  He is currently sitting up in the chair 

at the bedside.  He is awake and alert in no acute distress.  His subcutaneous 

emphysema is subsiding.  There is no sizable pneumothorax on the chest x-ray.  

There is mediastinum.  Evidence of COPD.  His chest tube had been clamped since 

3 AM.  There is no noted leak.  No worsening subcutaneous emphysema.





Objective





- Vital Signs


Vital signs: 


 Vital Signs











Temp  96.8 F L  09/14/17 11:00


 


Pulse  68   09/14/17 12:44


 


Resp  18   09/14/17 11:00


 


BP  102/54   09/14/17 11:00


 


Pulse Ox  92 L  09/14/17 11:00








 Intake & Output











 09/13/17 09/14/17 09/14/17





 18:59 06:59 18:59


 


Intake Total 1400  10


 


Output Total 275 825 


 


Balance 1125 -825 10


 


Weight 84.5 kg 83.7 kg 


 


Intake:   


 


  IV   10


 


    0.9% NS FLUSH 10 mL   10


 


  Oral 1400  


 


Output:   


 


  Chest Tube Drainage  25 


 


    Chest Tube Left Lateral  25 





    Chest   


 


  Urine 275 800 


 


Other:   


 


  Voiding Method Urinal Urinal 


 


  # Voids  1 














- Exam





GENERAL EXAM: Alert, comfortable in no apparent distress.


HEAD: Normocephalic.


EYES: Normal reaction of pupils, equal size.


NOSE: Clear with pink turbinates.


THROAT: No erythema or exudates.


NECK: No masses, no JVD.


CHEST: No chest wall deformity.  Left-sided chest tube remains in place.


LUNGS: Equal air entry with few scattered rhonchi in the left..


CVS: S1 and S2 normal with no audible murmurs, regular rhythm.


ABDOMEN: No hepatosplenomegaly, normal bowel sounds, no guarding or rigidity.


SPINE: No scoliosis or deformity


SKIN: Palpable subcutaneous emphysema of the upper chest, face and neck, 

improved today compared to yesterday.


CENTRAL NERVOUS SYSTEM: No focal deficits, tone is normal in all 4 extremities.


Extremities: There is trace peripheral edema.  No clubbing, no cyanosis.  

Peripheral pulses are intact.





- Labs


CBC & Chem 7: 


 09/11/17 05:25





 09/10/17 05:31





Assessment and Plan


Plan: 





Impression: 





#1 Status post left thoracoscopy, biopsy of mediastinal node and wedge 

resection of mass from left lingula.  Postoperative day #7


Pathology from the lung reveals squamous cell carcinoma.  There is also non-

caseating granulomas suspicious for sarcoidosis.





#2 Iatrogenic subcutaneous emphysema, however this will definitely improve 

knowing now that chest tube is placed back on suction.  Patient was reassured 

about the findings and he seems to be quite concerned.





#3 Coronary artery disease, 





#4 Hypertension, 





#5 Hypothyroidism, and 





#6 Hyperlipidemia. 





#7 History of suspected COPD considering his smoking history.





Plan:





the patient was seen and evaluated by Dr. Glasgow.  His chest x-ray and labs were 

reviewed.  His pathology report was noted and is positive for squamous cell 

carcinoma along with suspicion for sarcoidosis.  Oncology has been consulted.  

He'll have a repeat chest x-ray.  Possible removal of chest tube then. We will 

continue to follow and make further recommendations based on his clinical 

status.

## 2017-09-15 VITALS — DIASTOLIC BLOOD PRESSURE: 62 MMHG | TEMPERATURE: 97 F | HEART RATE: 90 BPM | SYSTOLIC BLOOD PRESSURE: 132 MMHG

## 2017-09-15 VITALS — RESPIRATION RATE: 20 BRPM

## 2017-09-15 RX ADMIN — FLUDROCORTISONE ACETATE SCH MG: 0.1 TABLET ORAL at 08:36

## 2017-09-15 RX ADMIN — HYDROCODONE BITARTRATE AND ACETAMINOPHEN PRN EACH: 5; 325 TABLET ORAL at 08:58

## 2017-09-15 RX ADMIN — PREDNISOLONE ACETATE SCH DROPS: 10 SUSPENSION/ DROPS OPHTHALMIC at 08:37

## 2017-09-15 RX ADMIN — IPRATROPIUM BROMIDE AND ALBUTEROL SULFATE SCH ML: .5; 3 SOLUTION RESPIRATORY (INHALATION) at 08:00

## 2017-09-15 RX ADMIN — LEVOTHYROXINE SODIUM SCH MCG: 0.12 TABLET ORAL at 06:31

## 2017-09-15 RX ADMIN — ISOSORBIDE MONONITRATE SCH MG: 30 TABLET, EXTENDED RELEASE ORAL at 08:37

## 2017-09-15 RX ADMIN — IPRATROPIUM BROMIDE AND ALBUTEROL SULFATE SCH: .5; 3 SOLUTION RESPIRATORY (INHALATION) at 15:00

## 2017-09-15 RX ADMIN — CARVEDILOL SCH MG: 12.5 TABLET, FILM COATED ORAL at 06:31

## 2017-09-15 RX ADMIN — DOCUSATE SODIUM SCH MG: 100 CAPSULE, LIQUID FILLED ORAL at 08:36

## 2017-09-15 RX ADMIN — ASPIRIN 325 MG ORAL TABLET SCH MG: 325 PILL ORAL at 08:34

## 2017-09-15 RX ADMIN — PANTOPRAZOLE SODIUM SCH MG: 40 TABLET, DELAYED RELEASE ORAL at 06:31

## 2017-09-15 RX ADMIN — HEPARIN SODIUM SCH UNIT: 5000 INJECTION, SOLUTION INTRAVENOUS; SUBCUTANEOUS at 08:34

## 2017-09-15 RX ADMIN — HEPARIN SODIUM SCH UNIT: 5000 INJECTION, SOLUTION INTRAVENOUS; SUBCUTANEOUS at 01:34

## 2017-09-15 NOTE — P.PN
Subjective





Progress note dated 09/11/2017





This is a 84-year-old male postop day #4 status post left thoracoscopy biopsy 

of mediastinal nodes a wedge resection of a mass from the lingula.  The patient 

developed some subcutaneous emphysema postoperatively.  No evidence of 

pneumothorax.  Patient seemed be doing relatively well.  Denies any chest pain 

chest discomfort shortness of breath cough wheezing or any complaints for that 

matter.  The patient still has evidence of subcutaneous emphysema on chest x-

ray.  Other than that the patient seems reasonably stable.  He has a history of 

hypertension hyperlipidemia hypothyroidism peripheral vascular disease and 

status post bifemoral bypass in the 1980s.  And also has a history of CAD COPD 

and GERD.





Progress note dated 09/15/2017





84-year-old gentleman who is postop left thoracoscopy biopsy admitted to the 

lymph nodes and wedge resection of a mass in the lingula.  Unfortunately his 

biopsy came back showing squamous cell carcinoma.  Lymph node evaluation showed 

noncaseating granuloma contact, consistent with sarcoidosis.  Special stains 

were negative.  Unfortunately, the patient developed significant subcutaneous 

emphysema which is been slow to resolve.  Even on today's chest x-ray is 

significant subcutaneous emphysema but chest tube was removed been removed 

there is no pneumothorax and he does not have tension subcutaneous emphysema.  

Hence, the patient be discharged home.  I the patient will be sent home on not 

requiring oxygen therapy.  I'll see him in the office.  He has an updraft 

machine with albuterol and Atrovent and also Combivent rest from that.  He'll 

be evaluated further in the office when I see him next week.  He has history of 

CAD and GERD hypertension hyperlipidemia hypothyroidism and peripheral last 

occlusive disease as well as previous bifemoral bypass in the 80s.





Objective





- Vital Signs


Vital signs: 


 Vital Signs











Temp  97.4 F L  09/15/17 08:00


 


Pulse  72   09/15/17 08:18


 


Resp  20   09/15/17 08:00


 


BP  111/53   09/15/17 08:00


 


Pulse Ox  97   09/15/17 08:00








 Intake & Output











 09/14/17 09/15/17 09/15/17





 18:59 06:59 18:59


 


Intake Total 910  80


 


Output Total  400 


 


Balance 910 -400 80


 


Weight  83.6 kg 


 


Intake:   


 


  IV 10  


 


    0.9% NS FLUSH 10 mL 10  


 


  Oral 900  80


 


Output:   


 


  Urine  400 


 


Other:   


 


  Voiding Method  Urinal 


 


  # Voids 0 1 0














- Exam





No acute distress, oriented 3.





H EENT examination is grossly unremarkable.  Mucous membranes are moist.  No 

oral lesions.





Neck supple.  Full range of motion.  No adenopathy or thyromegaly.  Neck veins 

are flat.





Cardio vascular examination reveals regular rhythm rate.  S1-S2 normal.  No S3-

S4.  No murmur appreciated.





Lungs reveal diminished breath sounds throughout.  Multiple breath sounds are 

relatively clear.  Evidence of palpable subcutaneous emphysema on the left 

side.  No wheezes.  No rhonchi.





Abdomen soft bowel sounds are heard.





Extremities are intact.  No cyanosis clubbing or edema.





Skin without rash.





Neurologic examination is brief but nonfocal.





- Labs


CBC & Chem 7: 


 09/11/17 05:25





 09/10/17 05:31





Assessment and Plan


Plan: 





Plan dated 09/11/2017





This patient is postop day #4 status post thoracoscopy and biopsy of 

mediastinal lymph nodes a wedge resection of the mass in the left lingula.  The 

patient's pathology earlier today was still pending.  The patient did develop 

some subcutaneous emphysema.  He seems stable.  Respiratory status hemodynamics 

are all stable.  His other medical problems increased CAD hypertension 

hypothyroidism hyperlipidemia all seem to be relatively safe stable.  He does 

have suspected COPD from tobacco use.





Plan dated 09/15/2017





The patient be discharged home.  Does not currently qualify for oxygen therapy.

  The  will get him an updraft machine to be used with albuterol 

and Atrovent 3-4 times a day and when necessary.  He also has a Combivent 

inhaler.  I'll see him in the office.  We did ask oncology to see him while he 

was here in the hospital for his recent diagnosis of squamous cell carcinoma 

the lung.  He'll need further staging with a PET scan.  Additional 

recommendations suggestions are forthcoming.  Prognosis is guarded.


Time with Patient: Less than 30

## 2017-09-15 NOTE — P.PN
<Rox Azul - Last Filed: 09/15/17 07:47>





Subjective


Principal diagnosis: 





Left lung mass, mediastinal adenopathy.  Hypertension, hyperlipidemia, 

hypothyroid, peripheral vascular disease with history of bifemoral bypass in 

the 1980s, history of mild coronary artery disease, chronic obstructive 

pulmonary disease, and severe gastroesophageal reflux disease.





POD #8 left thoracoscopy, biopsy of mediastinal lymph node, wedge resection of 

mass from left lingula











Patient's currently sitting up in the chair in no acute distress.  Denies pain, 

shortness of breath.  Still has small amount subcu emphysema to his left arm.  

Chest tube discontinued yesterday.





Objective





- Vital Signs


Vital signs: 


 Vital Signs











Temp  97.5 F L  09/15/17 04:00


 


Pulse  72   09/15/17 04:00


 


Resp  17   09/15/17 04:00


 


BP  152/67   09/15/17 04:00


 


Pulse Ox  96   09/15/17 04:00








 Intake & Output











 09/14/17 09/15/17 09/15/17





 18:59 06:59 18:59


 


Intake Total 910  80


 


Output Total  400 


 


Balance 910 -400 80


 


Weight  83.6 kg 


 


Intake:   


 


  IV 10  


 


    0.9% NS FLUSH 10 mL 10  


 


  Oral 900  80


 


Output:   


 


  Urine  400 


 


Other:   


 


  Voiding Method  Urinal 


 


  # Voids 0 1 














- Constitutional


General appearance: Present: cooperative, no acute distress





- Respiratory


Details: 





Lungs sounds clear bilaterally.  Respirations even, nonlabored.  Currently on 3 

L nasal cannula with excellent saturation 96%.  Able to achieve 1000 mL on his 

incentive spirometry.





- Cardiovascular


Details: 





S1, S2 present.  Regular rate and rhythm, normal sinus rhythm on telemetry.  

Palpable pulses bilaterally.  No edema present.





- Gastrointestinal


Gastrointestinal Comment(s): 





Abdomen soft, nontender, nondistended.  Active bowel sounds 4 quadrants.  

Tolerating diet.





- Genitourinary


Genitourinary Comment(s): 





Continues to void clear, yellow urine per urinal.





- Integumentary


Integumentary Comment(s): 





Left lateral chest incision well approximated and covered with dry intact 

dressing.





- Neurologic


Neurologic: Present: CNII-XII intact





- Musculoskeletal


Musculoskeletal: Present: gait normal, strength equal bilaterally





- Psychiatric


Psychiatric: Present: A&O x's 3, appropriate affect, intact judgment & insight





- Allied health notes


Allied health notes reviewed: nursing





- Labs


CBC & Chem 7: 


 09/11/17 05:25





 09/10/17 05:31





- Imaging and Cardiology


Chest x-ray: image reviewed





Assessment and Plan


(1) COPD (chronic obstructive pulmonary disease)


Status: Acute   





(2) GERD (gastroesophageal reflux disease)


Status: Acute   





(3) History of coronary artery disease


Status: Acute   





(4) Hyperlipidemia


Status: Acute   





(5) Hypertension


Status: Acute   





(6) Mass of left lung


Status: Acute   





(7) Mediastinal lymphadenopathy


Status: Acute   





(8) Subcutaneous emphysema, postoperative


Status: Acute   





(9) Hypothyroid


Status: Acute   


Plan: 





1.  Chest tube discontinued yesterday with no increase in subcu emphysema or 

shortness of breath.


2.  Oxygen saturation only 87-90% on room air, patient will need home oxygen at 

least for a short period of time.


3.  Encourage incentive spirometer use.


4.  Increase activity, ambulate in hallway.


5.  GI/DVT prophylaxis.


6.  Medical comorbidities to be managed by primary care service.


7.  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 

lingula specimen.  Oncology recommendations noted.


8.  Pain management per ordered medications.


9.  Likely will discharge to home with follow-up appointments for Dr. Gomez, 

Dr. Walker, and Dr. Glasgow.


Time with Patient: Greater than 30





<Drew Miller - Last Filed: 09/15/17 14:04>





Objective





- Vital Signs


Vital signs: 


 Vital Signs











Temp  97 F L  09/15/17 12:00


 


Pulse  90   09/15/17 12:00


 


Resp  20   09/15/17 12:00


 


BP  132/62   09/15/17 12:00


 


Pulse Ox  97   09/15/17 08:00








 Intake & Output











 09/14/17 09/15/17 09/15/17





 18:59 06:59 18:59


 


Intake Total 910  316


 


Output Total  400 


 


Balance 910 -400 316


 


Weight  83.6 kg 


 


Intake:   


 


  IV 10  


 


    0.9% NS FLUSH 10 mL 10  


 


  Oral 900  316


 


Output:   


 


  Urine  400 


 


Other:   


 


  Voiding Method  Urinal 


 


  # Voids 0 1 0














- Labs


CBC & Chem 7: 


 09/11/17 05:25





 09/10/17 05:31





Assessment and Plan


Plan: 


The patient was seen and examined.  I agree with the above assessment and plan.

  The patient's chest tube was removed yesterday.  Follow-up chest x-ray this 

morning reveals no evidence of pneumothorax.  He does continue to have some 

subcutaneous emphysema but it has not progressed.  The patient will be 

discharged home today

## 2017-09-15 NOTE — P.DS
Providers


Date of admission: 


09/07/17 11:15





Attending physician: 


Hector Gomez





Consults: 





 





09/07/17 15:45


Consult Physician Routine 


   Consulting Provider: Eber Glasgow


   Consult Reason/Comments: Pulmonary Management


   Do you want consulting provider notified?: Yes





09/07/17 18:40


Consult Physician Routine 


   Consulting Provider: Sonido Weber


   Consult Reason/Comments: Medical Management


   Do you want consulting provider notified?: Yes





09/13/17 12:21


Consult Physician Routine 


   Consulting Provider: Art Walker


   Consult Reason/Comments: Squamous cell carcinoma of the lung


   Do you want consulting provider notified?: Yes











Primary care physician: 


Stated None








- Discharge Diagnosis(es)


(1) COPD (chronic obstructive pulmonary disease)


Current Visit: Yes   Status: Acute   





(2) GERD (gastroesophageal reflux disease)


Current Visit: Yes   Status: Acute   





(3) History of coronary artery disease


Current Visit: Yes   Status: Acute   





(4) Hyperlipidemia


Current Visit: Yes   Status: Acute   





(5) Hypertension


Current Visit: Yes   Status: Acute   





(6) Mass of left lung


Current Visit: Yes   Status: Acute   





(7) Mediastinal lymphadenopathy


Current Visit: Yes   Status: Acute   





(8) Subcutaneous emphysema, postoperative


Current Visit: Yes   Status: Acute   





(9) Hypothyroid


Current Visit: No   Status: Acute   


Hospital Course: 





FINAL DIAGNOSIS: 


1.  Squamous cell carcinoma


2.  L5 mediastinal lymph node with confluent non-caseating granulomas


3.  Hypertension


4.  Hyperlipidemia


5.  Hypothyroid


6.  Peripheral vascular disease with history of bifemoral bypass in the 1980s


7.  History of mild coronary artery disease


8.  Chronic obstructive pulmonary disease


9.  Severe gastroesophageal reflux disease





PRINCIPAL PROCEDURE: 


1.  Left thoracoscopy


2.  Biopsy of mediastinal lymph node


3.  Wedge resection of mass from left lingula





HISTORY OF PRESENT ILLNESS: This 84-year-old gentleman who follows with Dr. Glasgow was recently found to have an abnormal CT of the chest and abnormal PET 

scan.  Apparently in March of this year he had been shoveling snow and had some 

shortness of breath and chest pain.  He came into the emergency room and 

received a full cardiac workup including a heart catheterization.  Part of his 

evaluation was a CTA of the chest secondary to an elevated d-dimer which 

demonstrated no pulmonary embolism but there was evidence of mediastinal 

lymphadenopathy as well as lung nodules.  He was recommended to have a repeat 

CAT scan, and in fact he had a PET scan in July 2017 which demonstrated 

mediastinal adenopathy as well as subcarinal and bilateral hilar nodes with 

uptake on PET.  He was referred to Dr. Gomez for surgical recommendations.  It 

was recommended that patient undergo thoracoscopy with biopsy and wedge 

resection.  An extensive discussion was had with the patient and the family, 

all risks and benefits were reviewed, the patient agreed to proceed with 

surgery.





HOSPITAL COURSE: The patient was brought to the hospital on 09/07/2017, taken 

to the preoperative area, prepared in the usual fashion and subsequently taken 

to the operating room where Dr. Gomez performed an elective left thoracoscopy, 

biopsy of mediastinal lymph node, and wedge resection of the mass from the left 

lingula.  Upon completion of surgery the patient was extubated, taken to the 

recovery room where he was monitored closely, and was eventually admitted to [address] [place] [address] for further monitoring.  He developed significant subcu 

emphysema requiring his chest tube to remain until postop day #7.  Pathology 

results demonstrated squamous cell carcinoma.  Dr. Walker from oncology was 

consulted and will follow with the patient.  He was tolerating his diet, 

ambulating in the hallway, his vital signs were stable, chest x-ray was improved

, and he was ready for discharge to home with [facility] on postop day #

8.  He received written and verbal instruction regarding his medications, signs 

and symptoms requiring physician notification, in follow-up appointments.





COMPLICATIONS: There were no postoperative complications.











DISCHARGE INSTRUCTIONS: 


1.  No driving until physician gives their ok.


2.  No lifting, pushing, or pulling more than 10 pounds for 2 weeks.  The 

physician will advise of any restriction changes.


3.  Continue pain control per as needed orders.


4.  Continue with incentive spirometry until otherwise directed by the 

physician.


5.  May shower daily using liquid antibacterial soap starting Saturday, September 16.


6.  Routine incision care. No powders, lotions, ointments on incisions.


7.  Please call surgeon/NP for temp greater than 101 F or purulent drainage 

from incisions.








HOME HEALTH SERVICES TO PROVIDE:  


   MEDICATION RECONCILIATION WITH EDUCATION AS NEEDED ON FIRST HOME VISIT


   ENCOURAGE USE OF INCENTIVE SPIROMETER 10 X EVERY HOUR WHILE AWAKE


   ENCOURAGE AMBULATION 3-5x/day  INCREASING AS TOLERATES, WHILE AVOID 

EXTREMES IN TEMPERATURE





FREQUENCY: RN TO OPEN THE PATIENT WITHIN 24 HOURS OF DISCHARGE FROM THE 

HOSPITAL WITH TELEHEALTH INSTALLED AT [facility], RN TO VISIT 2-3 X A WEEK FOR 4 WEEKS 

AS ESTABLISHED BY PATIENT NEEDS.




















Plan - Discharge Summary


New Discharge Prescriptions: 


New


   Docusate [Colace] 100 mg PO DAILY  cap


   HYDROcodone/APAP 5-325MG [[place]co 5-325] 1 each PO Q4HR PRN #60 tab


     PRN Reason: Pain


   Levothyroxine Sodium [Synthroid] 125 mcg PO DAILY@0630 #30 tab


   Tamsulosin [Flomax] 0.4 mg PO PC-SUPPER #30 cap


   Ipratropium-Albuterol Nebulize [Duoneb 0.5 mg-3 mg/3 ml Soln] 3 ml IH RT-QID 

#120 neb





Continue


   Omeprazole [PriLOSEC] 20 mg PO DAILY


   Isosorbide Mononitrate ER [Imdur] 30 mg PO DAILY


   Vits A,C,E/Lutein/Minerals [Ocuvite with Lutein Tablet] 1 tab PO BID


   Temazepam [Restoril] 15 mg PO HS PRN


     PRN Reason: Insomnia


   Aspirin EC [Ecotrin] 325 mg PO DAILY


   Simvastatin [Zocor] 40 mg PO HS


   Fludrocortisone [Florinef] 0.05 mg PO DAILY


   amLODIPine [Norvasc] 10 mg PO DAILY #30 tab


   Carvedilol [Coreg] 12.5 mg PO BID


   prednisoLONE ACETATE 1% OPHTH [Pred Forte 1%] 1 drops RIGHT EYE BID


   Ipratropium/Albuterol Sulfate [Combivent Respimat Inhaler] 1 puff INHALATION 

RT-QID





Discontinued


   Levothyroxine Sodium [Synthroid] 100 mcg PO DAILY


Discharge Medication List





Aspirin EC [Ecotrin] 325 mg PO DAILY 05/28/15 [History]


Isosorbide Mononitrate ER [Imdur] 30 mg PO DAILY 05/28/15 [History]


Omeprazole [PriLOSEC] 20 mg PO DAILY 05/28/15 [History]


Temazepam [Restoril] 15 mg PO HS PRN 05/28/15 [History]


Vits A,C,E/Lutein/Minerals [Ocuvite with Lutein Tablet] 1 tab PO BID 05/28/15 [

History]


Fludrocortisone [Florinef] 0.05 mg PO DAILY 03/04/17 [History]


Simvastatin [Zocor] 40 mg PO HS 03/04/17 [History]


amLODIPine [Norvasc] 10 mg PO DAILY #30 tab 03/05/17 [Rx]


Carvedilol [Coreg] 12.5 mg PO BID 08/29/17 [History]


Ipratropium/Albuterol Sulfate [Combivent Respimat Inhaler] 1 puff INHALATION RT-

QID 08/29/17 [History]


prednisoLONE ACETATE 1% OPHTH [Pred Forte 1%] 1 drops RIGHT EYE BID 08/29/17 [

History]


Docusate [Colace] 100 mg PO DAILY  cap 09/15/17 [Rx]


HYDROcodone/APAP 5-325MG [[place] 5-325] 1 each PO Q4HR PRN #60 tab 09/15/17 [Rx]


Ipratropium-Albuterol Nebulize [Duoneb 0.5 mg-3 mg/3 ml Soln] 3 ml IH RT-QID #

120 neb 09/15/17 [Rx]


Levothyroxine Sodium [Synthroid] 125 mcg PO DAILY@0630 #30 tab 09/15/17 [Rx]


Tamsulosin [Flomax] 0.4 mg PO PC-SUPPER #30 cap 09/15/17 [Rx]








Follow up Appointment(s)/Referral(s): 


Art Walker MD [STAFF PHYSICIAN] - 1 Week (Dr. Walker's office will call with 

appointment time.)


Hector Gomez MD [STAFF PHYSICIAN] - 09/20/17 10:30 am


Eber Glasgow DO [Doctor of Osteopathic Medicine] - 09/25/17 1:15 pm


[facility], [NON-STAFF] - 


Sonido Weber MD [Medical Doctor] - 09/22/17 9:30 am


Activity/Diet/Wound Care/Special Instructions: 


DISCHARGE INSTRUCTIONS: 


1.  No driving until physician gives their ok.


2.  No lifting, pushing, or pulling more than 10 pounds for 2 weeks.  The 

physician will advise of any restriction changes.


3.  Continue pain control per as needed orders.


4.  Continue with incentive spirometry until otherwise directed by the 

physician.


5.  May shower daily using liquid antibacterial soap starting Saturday, September 16.


6.  Routine incision care. No powders, lotions, ointments on incisions.


7.  Please call surgeon/NP for temp greater than 101 F or purulent drainage 

from incisions.








HOME HEALTH SERVICES TO PROVIDE:  


   MEDICATION RECONCILIATION WITH EDUCATION AS NEEDED ON FIRST HOME VISIT


   ENCOURAGE USE OF INCENTIVE SPIROMETER 10 X EVERY HOUR WHILE AWAKE


   ENCOURAGE AMBULATION 3-5x/day  INCREASING AS TOLERATES, WHILE AVOID 

EXTREMES IN TEMPERATURE





FREQUENCY: RN TO OPEN THE PATIENT WITHIN 24 HOURS OF DISCHARGE FROM THE 

HOSPITAL WITH TELEHEALTH INSTALLED AT [facility], RN TO VISIT 2-3 X A WEEK FOR 4 WEEKS 

AS ESTABLISHED BY PATIENT NEEDS.


Discharge Disposition: HOME WITH HOME HEALTH SERVICES

## 2017-09-15 NOTE — P.PN
Subjective


This is a pleasant 84-year-old  gentleman patient of Dr. Myah Walsh underlying history of COPD hypertension hyperlipidemia hyperglycemia BPH 

and mild CAD based on heart cath in 2014 requiring on the medical management, 

was noted to have a spot in the left lung based on an abnormal CT of the chest 

and abnormal PET/CT, on 9/72017 Patient underwent left thoracoscopy, biopsy of 

mediastinal lymph wedge resection of mass from left lingula, workup done in the 

past few weeks include a PET/CT performed 07/29/2017 showing hypermetabolic 

uptake in the left upper lobe nodule 12 mm pleural-based in the anterior 

mediastinum.  Patient currently has thoracostomy tube, and developed 

subcutaneous emphysema overnight, with upper chest swelling swollen face and 

swollen right eyelid.  Patient currently stable, no active problems at this 

time no shortness of breath no chest pain no  or GI complaints








9/9: Subcutaneous emphysema has stabilized and clinically is better, still has 

swollen eyelid from subcutaneous emphysema, no shortness of breath no chest 

pain.  Patient complains of constipation.  No melena and hematochezia no 

shortness of breath.  Thoracostomy chest wall tube to suction





9/10: Patient remains on chest tube with to suction, subcutaneous emphysema 

still noted on the face right orbit and chest, no new complaints today no chest 

pain no nausea no vomiting or diarrhea. Chest x-ray only shows minimal left 

pleural effusion, no pneumothorax for catheter is to be discontinued today 

patient on Flomax





9/11: Patient was seen and evaluated today.  Patient still noted to have 

subcutaneous emphysema noted to right chest and right side of face and orbit 

area.  Left pleural chest tube was placed to waterseal, patient continues to 

use his incentive spirometer.  Repeat chest x-ray shows increasingly confluent 

left basilar and midlung opacities, trace left pleural effusion, and extensive 

subcutaneous emphysema.  Pathology results are still pending. 





9/12: Patient was noted to sitting up in his bedside chair, family at the 

bedside.  Subcutaneous emphysema has improved significantly, although he still 

does have some scattered subcutaneous emphysema to his anterior chest, back, 

neck, upper extremities, right face and eye.  His chest tube was clamped this 

morning with possibilities to pull it this afternoon.  Repeat chest x-ray 

showed left-sided chest tube is stable, again extensive subcutaneous emphysema 

but no evidence for pneumothorax or pleural effusion.  Pathology results are 

still pending. 





9/13: Patients chest tube was clamped yesterday, within a few hours he started 

to have an increase in his subcutaneous emphysema.  Chest tube was unclamped.  

Patient was reevaluated this morning, his chest tube was clamped again per 

pulmonary, plan is to reassess in a few hours and if possible chest tube will 

be discontinued later on today.  Pathology reports were positive for squamous 

cell carcinoma from the left upper lobe lingula specimen.  Oncology consulted.





9/14: Patient was seen and evaluated today, he is sitting up in the chair 

resting comfortably.  His chest tube was again clamped yesterday, he again 

started to develop some subcutaneous emphysema, chest tube was unclamped.  

Chest tube has been clamped since 3 a.m. today with no increase in subcutaneous 

emphysema, possibility to discontinue the chest tube today per pulmonary if no 

other complications arise.  Overall his subcutaneous emphysema is improving 

significantly.  Pathology reports are positive for squamous cell carcinoma, 

oncology is on consult.





9/15: Patient was seen and evaluated today, he is noted to be sitting up in his 

chair resting comfortably, denies any increased shortness of breath.  The 

patient's chest tube was discontinued yesterday.  He still does have a small 

amount of subcutaneous emphysema in his left arm, otherwise it has improved 

significantly.  Oncology is on consult for the positive pathology reports of 

squamous cell carcinoma.  Pulmonology on consult as well, there is also noted 

to be some non-casing granulomas are suspicious for sarcoidosis on pathology 

report.  Repeat chest x-ray was negative for pneumothorax and some pleural 

thickening. 





Objective





- Vital Signs


Vital signs: 


 Vital Signs











Temp  97.4 F L  09/15/17 08:00


 


Pulse  72   09/15/17 08:18


 


Resp  20   09/15/17 08:00


 


BP  111/53   09/15/17 08:00


 


Pulse Ox  97   09/15/17 08:00








 Intake & Output











 09/14/17 09/15/17 09/15/17





 18:59 06:59 18:59


 


Intake Total 910  80


 


Output Total  400 


 


Balance 910 -400 80


 


Weight  83.6 kg 


 


Intake:   


 


  IV 10  


 


    0.9% NS FLUSH 10 mL 10  


 


  Oral 900  80


 


Output:   


 


  Urine  400 


 


Other:   


 


  Voiding Method  Urinal 


 


  # Voids 0 1 














- Exam


- Constitutional


General appearance: Present: cooperative, no acute distress





- EENT


Eyes: Present: anicteric sclerae, EOMI, PERRLA, dentition normal, normal 

appearance


ENT: Present: NA/AT, normal oropharynx





- Neck


Neck: Present: normal ROM





- Respiratory


Respiratory: bilateral: CTA, negative: diminished, dullness, prolonged 

expiration, prolonged inspiration





- Cardiovascular


Rhythm: regular


Heart sounds: normal: S1, S2


Abnormal Heart Sounds: Absent: systolic murmur, diastolic murmur, rub, S3 Gallop

, S4 Gallop, click, other





- Gastrointestinal


General gastrointestinal: Present: normal bowel sounds, soft





- Integumentary


Integumentary: Present: normal, normal turgor





- Neurologic


Neurologic: Present: CNII-XII intact





- Musculoskeletal


Musculoskeletal: Present: gait normal, strength equal bilaterally





- Psychiatric


Psychiatric: Present: A&O x's 3, appropriate affect, intact judgment & insight











- Labs


CBC & Chem 7: 


 09/11/17 05:25





 09/10/17 05:31





Assessment and Plan


Plan: 


1. Status post thoracotomy and wedge resection of lingular mass.  Procedure 

performed 09/07/2017 pathology sent, and positive for squamous cell carcinoma.  

There is also some non-caseating granulomas which could be consistent with 

sarcoidosis.  Oncology and pulmonary on consult. 





2. Subcutaneous emphysema with facial bloating and right eyelid swelling. 

Monitor for resolution and progression, subcutaneous emphysema has improved 

significantly.  Chest tube discontinued





3.  CAD. Known to have coronary disease from previous heart cath with medical 

management, stable, asymptomatic on Imdur 30 daily, Coreg, Lipitor, Norvasc asa 

325 daily





4. COPD: No Current symptoms. Continue patient on updrafts of albuterol/

ipratropium.





5. Hypertension: Continue Carvedilol 12.5 mg g twice a day, Amlodipine 10 mg 

daily, and Imdur 30 mg daily





6. Hyperlipidemia: On atorvastatin 20 mg daily.





7. Hypothyroidism: Was on Synthroid 100 g daily, currently uncontrolled TSH of 

11, levothyroxine increased 125 g daily. 





8. Severe GERD: On Protonix 40 mg daily





9. Chronic supplementation with Florinef 0.05 mg daily possibly for autonomic 

dysfunction rather than adrenal insufficiency.





12.  Mild anemia possibly blood loss related, monitor for decline, CBC is 

stable. 





13. CKD stage II. Nephrotoxins will be avoided, will continue to monitor CMP 

closely.





14. Impaired tandem blood sugars, hemoglobin A1c 7.4.





15. Acute hypoxemic respiratory failure with hypoxemia that has resolved. 

Currently on 2-3 L nasal cannula, patient to continue on incentive spirometry. 





GI prophylaxis Protonix 40mg QD 





DVT prophylaxis with subcutaneous heparin and GIL hose





The above impression and plan of care have been discussed and directed by 

signing physician. Becca Lindsay nurse practitioner acting as scribe for 

signing physician.

## 2017-11-28 ENCOUNTER — HOSPITAL ENCOUNTER (OUTPATIENT)
Dept: HOSPITAL 47 - RADCTMAIN | Age: 82
Discharge: HOME | End: 2017-11-28
Payer: MEDICARE

## 2017-11-28 DIAGNOSIS — J43.9: ICD-10-CM

## 2017-11-28 DIAGNOSIS — I27.21: ICD-10-CM

## 2017-11-28 DIAGNOSIS — Z98.890: ICD-10-CM

## 2017-11-28 DIAGNOSIS — C34.90: Primary | ICD-10-CM

## 2017-11-28 LAB
BUN SERPL-SCNC: 17 MG/DL (ref 9–20)
NON-AFRICAN AMERICAN GFR(MDRD): >60

## 2017-11-28 PROCEDURE — 36415 COLL VENOUS BLD VENIPUNCTURE: CPT

## 2017-11-28 PROCEDURE — 84520 ASSAY OF UREA NITROGEN: CPT

## 2017-11-28 PROCEDURE — 82565 ASSAY OF CREATININE: CPT

## 2017-11-28 PROCEDURE — 71260 CT THORAX DX C+: CPT

## 2017-11-28 NOTE — CT
EXAMINATION TYPE: CT chest w con

 

DATE OF EXAM: 11/28/2017

 

COMPARISON: 7/29/2017 and 5/28/2015

 

HISTORY: 84-year-old male follow-up, history of lung cancer

 

TECHNIQUE: Contiguous axial scanning of the chest after the administration of 100 mL of Omnipaque 300
.  Coronal/sagittal reconstructions performed.

 

CT DLP: 628mGycm. Automatic exposure control utilized for a dose reduction.

 

 

FINDINGS:

The heart is normal size without pericardial effusion. Extensive coronary vascular calcifications are
 present in remarkable for coronary artery disease.

 

Moderate to severe atherosclerotic calcifications throughout the aorta.

 

Borderline to mildly enlarged caliber to the main right and left pulmonary arteries are 2.7 and 2.5 c
m, respectively, suggesting underlying pulmonary arterial hypertension.

 

Mediastinal lymphadenopathy is again demonstrated measuring up to 2.0 cm right tracheobronchial angle
, unchanged, 9 mm, left tracheobronchial angle, 1.5 cm left infrahilar, and 2.1 cm subcarinal. This m
ay be slightly smaller measuring 2.3 cm, previously.

 

Evaluation of the lungs shows interval surgical changes of wedge resection anteriorly at the left mid
 lung. Some minimal scarring here at the site of previous neoplasm. No new pulmonary nodule occasiona
l emphysematous changes noted

 

Visualized upper abdomen shows stable exophytic lesion laterally from the right kidney, probable cyst
 and clear adrenal glands.

 

In the visualized upper abdomen, some borderline to mildly enlarged lymph nodes are present such as i
n the marlo hepatis measuring 1.1 cm, portacaval measuring 1.5 cm, and gastrohepatic measuring 1.0 cm
.

 

Bones: Degenerative disc disease mid and lower thoracic spine. No osseous destructive process seen.

 

 

 

IMPRESSION:  

 

1. Interval wedge resection anterior left midlung. No suspicious pulmonary nodules seen.

2. Overall stable mediastinal, left hilar, and upper abdominal lymphadenopathy largest measuring 2.1 
cm. The subcarinal lymph node may be a couple millimeters smaller. Likely secondary to an inflammator
y process like sarcoidosis.

3. COPD, mild emphysema, and pulmonary arterial hypertension.

## 2017-12-15 ENCOUNTER — HOSPITAL ENCOUNTER (OUTPATIENT)
Dept: HOSPITAL 47 - ORWHC2ENDO | Age: 82
Discharge: HOME | End: 2017-12-15
Payer: MEDICARE

## 2017-12-15 VITALS — SYSTOLIC BLOOD PRESSURE: 167 MMHG | DIASTOLIC BLOOD PRESSURE: 74 MMHG | HEART RATE: 80 BPM

## 2017-12-15 VITALS — TEMPERATURE: 97 F

## 2017-12-15 VITALS — BODY MASS INDEX: 29.8 KG/M2

## 2017-12-15 VITALS — RESPIRATION RATE: 18 BRPM

## 2017-12-15 DIAGNOSIS — Z88.8: ICD-10-CM

## 2017-12-15 DIAGNOSIS — Z79.899: ICD-10-CM

## 2017-12-15 DIAGNOSIS — E78.5: ICD-10-CM

## 2017-12-15 DIAGNOSIS — E07.9: ICD-10-CM

## 2017-12-15 DIAGNOSIS — J44.9: ICD-10-CM

## 2017-12-15 DIAGNOSIS — R59.0: Primary | ICD-10-CM

## 2017-12-15 DIAGNOSIS — I10: ICD-10-CM

## 2017-12-15 DIAGNOSIS — K21.9: ICD-10-CM

## 2017-12-15 LAB — GLUCOSE BLD-MCNC: 120 MG/DL (ref 75–99)

## 2017-12-15 PROCEDURE — 88312 SPECIAL STAINS GROUP 1: CPT

## 2017-12-15 PROCEDURE — 94640 AIRWAY INHALATION TREATMENT: CPT

## 2017-12-15 PROCEDURE — 31629 BRONCHOSCOPY/NEEDLE BX EACH: CPT

## 2017-12-15 PROCEDURE — 88173 CYTOPATH EVAL FNA REPORT: CPT

## 2017-12-15 PROCEDURE — 88305 TISSUE EXAM BY PATHOLOGIST: CPT

## 2017-12-15 NOTE — P.PCN
Date of Procedure: 12/15/17


Preoperative Diagnosis: 


Paratracheal and subcarinal lymphadenopathy


Postoperative Diagnosis: 


I paratracheal lymphadenopathy/subcarinal lymphadenopathy


Procedure(s) Performed: 


 bronchoscopy, transbronchial needle aspirate


Anesthesia: MAC


Surgeon: Mayte Maldonado


Estimated Blood Loss (ml): 0


Pathology: other


Condition: stable


Disposition: same day


Operative Findings: 


This procedure was done under conscious sedation with anesthetic agents being 

administered by anesthesia the bedside.  After achieving adequate sedation 

flexible bronchoscope was inserted to the right nostril was advanced upper 

airway.  Upper airway structures included the pharynx, larynx, epiglottis, 

vallecula, arytenoids and vocal cords and all of these upper airway structures 

were within normal limits.  A total of 2 mL of 1% lidocaine was applied to the 

vocal cords and following that the bronchoscope was advanced into the upper 

trachea and examination of the airways was done.  Examination showed no 

abnormalities.  The visualized airways included the trachea , bilateral 

mainstem bronchi, right upper lobe bronchus, right middle lobe bronchus, right 

lower lobe bronchus, left upper lobe bronchus, left lower lobe bronchus, along 

with segments and subsegments at all of these airways were patent and within 

normal limits.  The bronchoscope was moved in to the distal trachea and 

transbronchial needle aspirate was done using a 19-gauge she started to needle.

  A total of 4 passes were obtained sampling the right paratracheal 

lymphadenopathy.  No endobronchial bleeding was encountered.  The bronchoscope 

was removed and the patient was transferred recovery in stable condition.  The 

samples will be sent for pathologic evaluation.  Discharge once cleared by 

medicine.

## 2018-07-17 ENCOUNTER — HOSPITAL ENCOUNTER (EMERGENCY)
Dept: HOSPITAL 47 - EC | Age: 83
Discharge: HOME | End: 2018-07-17
Payer: MEDICARE

## 2018-07-17 VITALS
DIASTOLIC BLOOD PRESSURE: 65 MMHG | SYSTOLIC BLOOD PRESSURE: 142 MMHG | HEART RATE: 72 BPM | RESPIRATION RATE: 16 BRPM | TEMPERATURE: 98.3 F

## 2018-07-17 DIAGNOSIS — I10: ICD-10-CM

## 2018-07-17 DIAGNOSIS — G51.0: Primary | ICD-10-CM

## 2018-07-17 DIAGNOSIS — R40.2362: ICD-10-CM

## 2018-07-17 DIAGNOSIS — E07.9: ICD-10-CM

## 2018-07-17 DIAGNOSIS — Z90.49: ICD-10-CM

## 2018-07-17 DIAGNOSIS — Z79.52: ICD-10-CM

## 2018-07-17 DIAGNOSIS — R40.2142: ICD-10-CM

## 2018-07-17 DIAGNOSIS — Z91.048: ICD-10-CM

## 2018-07-17 DIAGNOSIS — Z98.890: ICD-10-CM

## 2018-07-17 DIAGNOSIS — Z87.891: ICD-10-CM

## 2018-07-17 DIAGNOSIS — K21.9: ICD-10-CM

## 2018-07-17 DIAGNOSIS — Z79.899: ICD-10-CM

## 2018-07-17 DIAGNOSIS — E78.5: ICD-10-CM

## 2018-07-17 DIAGNOSIS — Z88.8: ICD-10-CM

## 2018-07-17 DIAGNOSIS — J44.9: ICD-10-CM

## 2018-07-17 DIAGNOSIS — Z79.82: ICD-10-CM

## 2018-07-17 DIAGNOSIS — R40.2252: ICD-10-CM

## 2018-07-17 DIAGNOSIS — Z85.118: ICD-10-CM

## 2018-07-17 LAB
ALBUMIN SERPL-MCNC: 3.8 G/DL (ref 3.5–5)
ALP SERPL-CCNC: 68 U/L (ref 38–126)
ALT SERPL-CCNC: 30 U/L (ref 21–72)
ANION GAP SERPL CALC-SCNC: 10 MMOL/L
APTT BLD: 25.5 SEC (ref 22–30)
AST SERPL-CCNC: 27 U/L (ref 17–59)
BASOPHILS # BLD AUTO: 0 K/UL (ref 0–0.2)
BASOPHILS NFR BLD AUTO: 1 %
BUN SERPL-SCNC: 16 MG/DL (ref 9–20)
CALCIUM SPEC-MCNC: 8.8 MG/DL (ref 8.4–10.2)
CHLORIDE SERPL-SCNC: 103 MMOL/L (ref 98–107)
CK SERPL-CCNC: 55 U/L (ref 55–170)
CO2 SERPL-SCNC: 26 MMOL/L (ref 22–30)
EOSINOPHIL # BLD AUTO: 0.2 K/UL (ref 0–0.7)
EOSINOPHIL NFR BLD AUTO: 5 %
ERYTHROCYTE [DISTWIDTH] IN BLOOD BY AUTOMATED COUNT: 4.31 M/UL (ref 4.3–5.9)
ERYTHROCYTE [DISTWIDTH] IN BLOOD: 14.1 % (ref 11.5–15.5)
GLUCOSE SERPL-MCNC: 164 MG/DL (ref 74–99)
HCT VFR BLD AUTO: 39.1 % (ref 39–53)
HGB BLD-MCNC: 13 GM/DL (ref 13–17.5)
INR PPP: 1.2 (ref ?–1.2)
LYMPHOCYTES # SPEC AUTO: 0.9 K/UL (ref 1–4.8)
LYMPHOCYTES NFR SPEC AUTO: 21 %
MCH RBC QN AUTO: 30.1 PG (ref 25–35)
MCHC RBC AUTO-ENTMCNC: 33.2 G/DL (ref 31–37)
MCV RBC AUTO: 90.7 FL (ref 80–100)
MONOCYTES # BLD AUTO: 0.5 K/UL (ref 0–1)
MONOCYTES NFR BLD AUTO: 12 %
NEUTROPHILS # BLD AUTO: 2.6 K/UL (ref 1.3–7.7)
NEUTROPHILS NFR BLD AUTO: 58 %
PLATELET # BLD AUTO: 178 K/UL (ref 150–450)
POTASSIUM SERPL-SCNC: 4.9 MMOL/L (ref 3.5–5.1)
PROT SERPL-MCNC: 6.6 G/DL (ref 6.3–8.2)
PT BLD: 11.5 SEC (ref 9–12)
SODIUM SERPL-SCNC: 139 MMOL/L (ref 137–145)
TROPONIN I SERPL-MCNC: <0.012 NG/ML (ref 0–0.03)
WBC # BLD AUTO: 4.6 K/UL (ref 3.8–10.6)

## 2018-07-17 PROCEDURE — 93005 ELECTROCARDIOGRAM TRACING: CPT

## 2018-07-17 PROCEDURE — 36415 COLL VENOUS BLD VENIPUNCTURE: CPT

## 2018-07-17 PROCEDURE — 82553 CREATINE MB FRACTION: CPT

## 2018-07-17 PROCEDURE — 82550 ASSAY OF CK (CPK): CPT

## 2018-07-17 PROCEDURE — 71046 X-RAY EXAM CHEST 2 VIEWS: CPT

## 2018-07-17 PROCEDURE — 80053 COMPREHEN METABOLIC PANEL: CPT

## 2018-07-17 PROCEDURE — 85025 COMPLETE CBC W/AUTO DIFF WBC: CPT

## 2018-07-17 PROCEDURE — 85730 THROMBOPLASTIN TIME PARTIAL: CPT

## 2018-07-17 PROCEDURE — 70450 CT HEAD/BRAIN W/O DYE: CPT

## 2018-07-17 PROCEDURE — 85610 PROTHROMBIN TIME: CPT

## 2018-07-17 PROCEDURE — 99284 EMERGENCY DEPT VISIT MOD MDM: CPT

## 2018-07-17 PROCEDURE — 84484 ASSAY OF TROPONIN QUANT: CPT

## 2018-07-17 NOTE — ED
Neuro HPI





<AryanPoncho - Last Filed: 07/17/18 11:09>





- General


Source: patient, RN notes reviewed


Mode of arrival: wheelchair


Limitations: no limitations





- History of Present Illness


Is the patient presenting with stroke symptoms?: No


Last Known Well Date: 07/16/18


Last Known Well Time: 15:59





<Presley Morrison - Last Filed: 07/17/18 11:23>





- General


Chief Complaint: Neuro Symptoms/Deficit


Stated Complaint: poss CVA


Time Seen by Provider: 07/17/18 09:36





- History of Present Illness


Initial Comments: 





This is an 85-year-old male presents emergency Department with chief complaint 

of right-sided facial droop.  Patient states it started yesterday.  He states 

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 

degeneration and states that he went to use somebody's eyedrops.  He states he 

also went to [place] to his mild noticed that the water fell out the right side 

of his mouth.  He does complain of mild right-sided headache at this time 

states is unable to lay on his right side secondary to the headache.  Patient 

denies any rashes.  Denies any extremity weakness, paresthesias, chest pain or 

shortness of breath.  Patient states that he does have an appointment with his 

PCP and was given a weight but was concerned with the continuation of the 

facial droop that started at 4 PM yesterday.  Patient denies any head trauma 

denies fever, chills, nausea or vomiting.  Patient has a history of 

hypertension hyperlipidemia, and MI (Presley Morrison)





- Related Data


Home Medications: 


 Home Medications











 Medication  Instructions  Recorded  Confirmed


 


Aspirin EC [Ecotrin] 325 mg PO DAILY 05/28/15 07/17/18


 


Isosorbide Mononitrate ER [Imdur] 30 mg PO DAILY 05/28/15 07/17/18


 


Omeprazole [PriLOSEC] 20 mg PO BID 05/28/15 07/17/18


 


Temazepam [Restoril] 15 mg PO HS PRN 05/28/15 07/17/18


 


Vits A,C,E/Lutein/Minerals 1 tab PO BID 05/28/15 07/17/18





[Ocuvite with Lutein Tablet]   


 


Fludrocortisone [Florinef] 0.01 mg PO DAILY 03/04/17 07/17/18


 


Simvastatin [Zocor] 40 mg PO HS 03/04/17 07/17/18


 


Carvedilol [Coreg] 12.5 mg PO BID 08/29/17 07/17/18


 


Ipratropium/Albuterol Sulfate 1 puff INHALATION RT-QID PRN 08/29/17 07/17/18





[Combivent Respimat Inhaler]   


 


Vit C/E/Zn/Coppr/Lutein/Zeaxan 1 cap PO DAILY 07/17/18 07/17/18





[Preservision Areds 2 Softgel]   








 Previous Rx's











 Medication  Instructions  Recorded


 


Levothyroxine Sodium [Synthroid] 125 mcg PO DAILY@0630 #30 tab 09/15/17


 


predniSONE 50 mg PO DAILY #5 tab 07/17/18


 


valACYclovir HCL [Valtrex] 1,000 mg PO Q8HR #30 tab 07/17/18











Allergies/Adverse Reactions: 


 Allergies











Allergy/AdvReac Type Severity Reaction Status Date / Time


 


povidone-iodine Allergy Unknown Red Skin Verified 07/17/18 09:51





[From Betadine]     


 


soap [From Betadine] Allergy Unknown Red Skin Verified 07/17/18 09:25














Review of Systems


ROS Other: All systems not noted in ROS Statement are negative.





<Poncho Baeza - Last Filed: 07/17/18 11:09>


ROS Other: All systems not noted in ROS Statement are negative.





<Presley Morrison - Last Filed: 07/17/18 11:23>


ROS Statement: 


Those systems with pertinent positive or pertinent negative responses have been 

documented in the HPI.








General Exam


Limitations: no limitations


General appearance: alert, in no apparent distress


Head exam: Present: atraumatic, normocephalic, normal inspection


Eye exam: Present: normal appearance, PERRL, EOMI, other (Unable to completely 

close right eye).  Absent: scleral icterus, conjunctival injection, periorbital 

swelling


ENT exam: Present: normal exam, mucous membranes moist, other (Noted right-

sided facial droop, does not spare right-sided of forehead)


Neck exam: Present: normal inspection, full ROM.  Absent: tenderness, 

meningismus, lymphadenopathy


Respiratory exam: Present: normal lung sounds bilaterally.  Absent: respiratory 

distress, wheezes, rales, rhonchi, stridor


Cardiovascular Exam: Present: regular rate, normal rhythm, normal heart sounds.

  Absent: systolic murmur, diastolic murmur, rubs, gallop, clicks


Extremities exam: Present: other (Extremity strength upper and lower extremity 

equal bilaterally 5/5)


Neurological exam: Present: alert, oriented X3, CN II-XII intact, reflexes 

normal, other (Finger to nose intact bilaterally).  Absent: motor sensory 

deficit


  ** Expanded


Patient oriented to: Present: person, place, time


Speech: Present: fluid speech


Cranial nerves: EOM's Intact: Normal, Gag Reflex: Normal, Tongue Deviation: 

Normal, Facial Sensation: Normal, Facial Palsy without Forehead Movement: 

Abnormal Right


Cerebellar function: Finger to Nose: Normal, Heel to Shin: Normal


Sensory exam: Upper Extremity Light Touch: Normal, Upper Extremity Pin Prick: 

Normal, Upper Extremity Temperature: Normal


Motor strength exam: RUE: 5, LUE: 5, RLE: 5, LLE: 5


Eye Response: (4) open spontaneously


Motor Response: (6) obeys commands


Verbal Response: (5) oriented


[place] Total: 15 (NIH 2)


Skin exam: Present: warm, dry, intact, normal color.  Absent: rash





<Presley Morrison - Last Filed: 07/17/18 11:23>





Stroke MDM





- Lab Data


Result diagrams: 


 07/17/18 09:55





 07/17/18 09:55





<Poncho Baeza - Last Filed: 07/17/18 11:09>





- Lab Data


Result diagrams: 


 07/17/18 09:55





 07/17/18 09:55





<Presley Morrison - Last Filed: 07/17/18 11:23>





- Lab Data


 Lab Results











  07/17/18 07/17/18 07/17/18 Range/Units





  09:55 09:55 09:55 


 


WBC   4.6   (3.8-10.6)  k/uL


 


RBC   4.31   (4.30-5.90)  m/uL


 


Hgb   13.0   (13.0-17.5)  gm/dL


 


Hct   39.1   (39.0-53.0)  %


 


MCV   90.7   (80.0-100.0)  fL


 


MCH   30.1   (25.0-35.0)  pg


 


MCHC   33.2   (31.0-37.0)  g/dL


 


RDW   14.1   (11.5-15.5)  %


 


Plt Count   178   (150-450)  k/uL


 


Neutrophils %   58   %


 


Lymphocytes %   21   %


 


Monocytes %   12   %


 


Eosinophils %   5   %


 


Basophils %   1   %


 


Neutrophils #   2.6   (1.3-7.7)  k/uL


 


Lymphocytes #   0.9 L   (1.0-4.8)  k/uL


 


Monocytes #   0.5   (0-1.0)  k/uL


 


Eosinophils #   0.2   (0-0.7)  k/uL


 


Basophils #   0.0   (0-0.2)  k/uL


 


PT     (9.0-12.0)  sec


 


INR     (<1.2)  


 


APTT     (22.0-30.0)  sec


 


Sodium    139  (137-145)  mmol/L


 


Potassium    4.9  (3.5-5.1)  mmol/L


 


Chloride    103  ()  mmol/L


 


Carbon Dioxide    26  (22-30)  mmol/L


 


Anion Gap    10  mmol/L


 


BUN    16  (9-20)  mg/dL


 


Creatinine    1.07  (0.66-1.25)  mg/dL


 


Est GFR (CKD-EPI)AfAm    73  (>60 ml/min/1.73 sqM)  


 


Est GFR (CKD-EPI)NonAf    64  (>60 ml/min/1.73 sqM)  


 


Glucose    164 H  (74-99)  mg/dL


 


Calcium    8.8  (8.4-10.2)  mg/dL


 


Total Bilirubin    0.5  (0.2-1.3)  mg/dL


 


AST    27  (17-59)  U/L


 


ALT    30  (21-72)  U/L


 


Alkaline Phosphatase    68  ()  U/L


 


Total Creatine Kinase  55    ()  U/L


 


CK-MB (CK-2)  1.9    (0.0-2.4)  ng/mL


 


CK-MB (CK-2) Rel Index  3.5    


 


Troponin I  <0.012    (0.000-0.034)  ng/mL


 


Total Protein    6.6  (6.3-8.2)  g/dL


 


Albumin    3.8  (3.5-5.0)  g/dL














  07/17/18 Range/Units





  09:55 


 


WBC   (3.8-10.6)  k/uL


 


RBC   (4.30-5.90)  m/uL


 


Hgb   (13.0-17.5)  gm/dL


 


Hct   (39.0-53.0)  %


 


MCV   (80.0-100.0)  fL


 


MCH   (25.0-35.0)  pg


 


MCHC   (31.0-37.0)  g/dL


 


RDW   (11.5-15.5)  %


 


Plt Count   (150-450)  k/uL


 


Neutrophils %   %


 


Lymphocytes %   %


 


Monocytes %   %


 


Eosinophils %   %


 


Basophils %   %


 


Neutrophils #   (1.3-7.7)  k/uL


 


Lymphocytes #   (1.0-4.8)  k/uL


 


Monocytes #   (0-1.0)  k/uL


 


Eosinophils #   (0-0.7)  k/uL


 


Basophils #   (0-0.2)  k/uL


 


PT  11.5  (9.0-12.0)  sec


 


INR  1.2 H  (<1.2)  


 


APTT  25.5  (22.0-30.0)  sec


 


Sodium   (137-145)  mmol/L


 


Potassium   (3.5-5.1)  mmol/L


 


Chloride   ()  mmol/L


 


Carbon Dioxide   (22-30)  mmol/L


 


Anion Gap   mmol/L


 


BUN   (9-20)  mg/dL


 


Creatinine   (0.66-1.25)  mg/dL


 


Est GFR (CKD-EPI)AfAm   (>60 ml/min/1.73 sqM)  


 


Est GFR (CKD-EPI)NonAf   (>60 ml/min/1.73 sqM)  


 


Glucose   (74-99)  mg/dL


 


Calcium   (8.4-10.2)  mg/dL


 


Total Bilirubin   (0.2-1.3)  mg/dL


 


AST   (17-59)  U/L


 


ALT   (21-72)  U/L


 


Alkaline Phosphatase   ()  U/L


 


Total Creatine Kinase   ()  U/L


 


CK-MB (CK-2)   (0.0-2.4)  ng/mL


 


CK-MB (CK-2) Rel Index   


 


Troponin I   (0.000-0.034)  ng/mL


 


Total Protein   (6.3-8.2)  g/dL


 


Albumin   (3.5-5.0)  g/dL














- Medical Decision Making





85-year-old male presented for right-sided facial drooping.  Patient has 

symptoms consistent with Bell palsy.  He did have a complete workup including CT

, lab work.  Patient is stable at this time he will be discharged to follow-up 

with his PCP today. (Presley Morrison)





07/17/18 10:36


EKG performed at 10:22 normal sinus rhythm with left axis deviation and right 

bundle there are some T-wave inversions noted with a rate of 73  , 

QT//456 (Presley Morrison)





Past Medical History


Past Medical History: Cancer, COPD, Eye Disorder, GERD/Reflux, Hyperlipidemia, 

Hypertension, Thyroid Disorder


Additional Past Medical History / Comment(s): MACULAR DEGENERATION- POOR VISION.

, STATES IRREGULAR HEART BEAT, dx. lung cancer Sept. 2017


History of Any Multi-Drug Resistant Organisms: None Reported


Past Surgical History: Appendectomy, Cholecystectomy, Heart Catheterization, 

Orthopedic Surgery


Additional Past Surgical History / Comment(s): several heart cath's, Bifemoral 

bypass grafting (1980's), wrist surg., wedge resection lung in Sept, 2017


Past Anesthesia/Blood Transfusion Reactions: No Reported Reaction


Past Psychological History: No Psychological Hx Reported


Smoking Status: Former smoker


Past Alcohol Use History: None Reported


Past Drug Use History: None Reported





- Past Family History


  ** Mother


Family Medical History: CVA/TIA, Hypertension





  ** Father


Family Medical History: Cancer


Additional Family Medical History / Comment(s): CANCER OF LIP





<Presley Morrison - Last Filed: 07/17/18 11:23>





Course





<Poncho Baeza - Last Filed: 07/17/18 11:09>





<Presley Morrison - Last Filed: 07/17/18 11:23>


 Vital Signs











  07/17/18 07/17/18





  09:22 10:24


 


Temperature 97.9 F 


 


Pulse Rate 81 78


 


Respiratory 18 18





Rate  


 


Blood Pressure 179/77 142/64


 


O2 Sat by Pulse 96 99





Oximetry  














- Reevaluation(s)


Reevaluation #1: 





07/17/18 11:09


Patient reevaluated by myself, Dr. Baeza.  Patient resting comfortably in bed.  

Onset of symptoms was yesterday and progressively worsened since that time.  

Patient does have right facial droop.  Patient is unable to fully shut his 

right eye.  No facial loss of sensation.  Extremity sensation intact.  

Extremity strength 5/5 throughout.  Patient has symptoms of classic Bell's 

palsy.  Patient and family updated on results and need for follow-up.  They 

state patient has an appointment today with his primary care physician and will 

still go to that.  I reviewed and agree to PA findings.  This includes all 

diagnostic interpretations and treatment plan. (Poncho Baeza)





Disposition





<Poncho Baeza - Last Filed: 07/17/18 11:09>


Is patient prescribed a controlled substance at d/c from ED?: No


Time of Disposition: 11:23





<Presley Morrison - Last Filed: 07/17/18 11:23>


Clinical Impression: 


 Bell's palsy





Disposition: HOME SELF-CARE


Condition: Stable


Instructions:  Bell Palsy (ED)


Additional Instructions: 


Please return to the Emergency Department if symptoms worsen or any other 

concerns.


Prescriptions: 


predniSONE 50 mg PO DAILY #5 tab


valACYclovir HCL [Valtrex] 1,000 mg PO Q8HR #30 tab


Referrals: 


Sonido Weber MD [Primary Care Provider] - 1-2 days

## 2018-07-17 NOTE — CT
EXAMINATION TYPE: CT brain wo con

 

DATE OF EXAM: 7/17/2018

 

COMPARISON: CT brain 5/20/2015

 

HISTORY: Right sided facial droop and pain

 

CT DLP: 1144.7 mGycm

Automated exposure control for dose reduction was used. Helical acquisition through the brain.

 

FINDINGS: 

Cerebral vascular calcifications are present. Brain shows a similar appearance. There is cortical atr
ophy and evidence of chronic small vessel ischemic change, possible small lacunar infarcts in the bas
al ganglia. Question low-attenuation within the brainstem. Periventricular white matter shows low att
enuation. There is no hemorrhage or hydrocephalus. Calvarium is intact. Inflammatory change present i
n the ethmoid air cells and frontal sinus.

 

IMPRESSION: 

EVIDENCE OF CHRONIC SMALL VESSEL ISCHEMIA, AGE RELATED ATROPHY. ADDITIONAL FINDINGS ABOVE. CONSIDER B
RAIN MRI FOR BETTER EVALUATION.

## 2018-07-17 NOTE — XR
EXAMINATION TYPE: XR chest 2V

 

DATE OF EXAM: 7/17/2018

 

COMPARISON: 9/15/2017

 

HISTORY: 85 year-old male altered mental status

 

TECHNIQUE:  Frontal and lateral views

 

FINDINGS:  

A hypoplastic right-sided rib or prior thoracotomy change. Some chronic appearing left lateral rib de
formities also appear to be present. Heart upper limits of normal in size. Other scattered calcificat
ions within the aorta. Strandy atelectasis left base. No consolidation or pleural effusion.

 

 

IMPRESSION:  

No acute cardiopulmonary process.

## 2018-08-03 ENCOUNTER — HOSPITAL ENCOUNTER (INPATIENT)
Dept: HOSPITAL 47 - EC | Age: 83
LOS: 3 days | Discharge: HOME | DRG: 392 | End: 2018-08-06
Attending: INTERNAL MEDICINE | Admitting: INTERNAL MEDICINE
Payer: MEDICARE

## 2018-08-03 DIAGNOSIS — I12.9: ICD-10-CM

## 2018-08-03 DIAGNOSIS — Z79.890: ICD-10-CM

## 2018-08-03 DIAGNOSIS — Z85.118: ICD-10-CM

## 2018-08-03 DIAGNOSIS — N18.2: ICD-10-CM

## 2018-08-03 DIAGNOSIS — Z79.82: ICD-10-CM

## 2018-08-03 DIAGNOSIS — K21.9: ICD-10-CM

## 2018-08-03 DIAGNOSIS — Z98.62: ICD-10-CM

## 2018-08-03 DIAGNOSIS — Z90.49: ICD-10-CM

## 2018-08-03 DIAGNOSIS — Z88.8: ICD-10-CM

## 2018-08-03 DIAGNOSIS — Z95.1: ICD-10-CM

## 2018-08-03 DIAGNOSIS — G90.9: ICD-10-CM

## 2018-08-03 DIAGNOSIS — E86.0: ICD-10-CM

## 2018-08-03 DIAGNOSIS — H35.30: ICD-10-CM

## 2018-08-03 DIAGNOSIS — J44.9: ICD-10-CM

## 2018-08-03 DIAGNOSIS — Z87.891: ICD-10-CM

## 2018-08-03 DIAGNOSIS — A09: ICD-10-CM

## 2018-08-03 DIAGNOSIS — K86.89: ICD-10-CM

## 2018-08-03 DIAGNOSIS — Z79.899: ICD-10-CM

## 2018-08-03 DIAGNOSIS — E03.9: ICD-10-CM

## 2018-08-03 DIAGNOSIS — Z80.8: ICD-10-CM

## 2018-08-03 DIAGNOSIS — E78.5: ICD-10-CM

## 2018-08-03 DIAGNOSIS — D50.0: ICD-10-CM

## 2018-08-03 DIAGNOSIS — E11.9: ICD-10-CM

## 2018-08-03 DIAGNOSIS — K57.32: Primary | ICD-10-CM

## 2018-08-03 DIAGNOSIS — H54.7: ICD-10-CM

## 2018-08-03 DIAGNOSIS — I25.10: ICD-10-CM

## 2018-08-03 DIAGNOSIS — Z82.49: ICD-10-CM

## 2018-08-03 LAB
ALBUMIN SERPL-MCNC: 3.5 G/DL (ref 3.5–5)
ALP SERPL-CCNC: 102 U/L (ref 38–126)
ALT SERPL-CCNC: 32 U/L (ref 21–72)
AMYLASE SERPL-CCNC: <30 U/L (ref 30–110)
ANION GAP SERPL CALC-SCNC: 8 MMOL/L
AST SERPL-CCNC: 27 U/L (ref 17–59)
BASOPHILS # BLD AUTO: 0 K/UL (ref 0–0.2)
BASOPHILS NFR BLD AUTO: 1 %
BUN SERPL-SCNC: 15 MG/DL (ref 9–20)
CALCIUM SPEC-MCNC: 8.8 MG/DL (ref 8.4–10.2)
CHLORIDE SERPL-SCNC: 106 MMOL/L (ref 98–107)
CO2 SERPL-SCNC: 23 MMOL/L (ref 22–30)
EOSINOPHIL # BLD AUTO: 0.2 K/UL (ref 0–0.7)
EOSINOPHIL NFR BLD AUTO: 3 %
ERYTHROCYTE [DISTWIDTH] IN BLOOD BY AUTOMATED COUNT: 4.17 M/UL (ref 4.3–5.9)
ERYTHROCYTE [DISTWIDTH] IN BLOOD: 15.1 % (ref 11.5–15.5)
GLUCOSE SERPL-MCNC: 143 MG/DL (ref 74–99)
HCT VFR BLD AUTO: 37.6 % (ref 39–53)
HGB BLD-MCNC: 12.5 GM/DL (ref 13–17.5)
HYALINE CASTS UR QL AUTO: 44 /LPF (ref 0–2)
LIPASE SERPL-CCNC: 40 U/L (ref 23–300)
LYMPHOCYTES # SPEC AUTO: 0.8 K/UL (ref 1–4.8)
LYMPHOCYTES NFR SPEC AUTO: 13 %
MCH RBC QN AUTO: 29.9 PG (ref 25–35)
MCHC RBC AUTO-ENTMCNC: 33.3 G/DL (ref 31–37)
MCV RBC AUTO: 90.1 FL (ref 80–100)
MONOCYTES # BLD AUTO: 0.5 K/UL (ref 0–1)
MONOCYTES NFR BLD AUTO: 9 %
NEUTROPHILS # BLD AUTO: 4.1 K/UL (ref 1.3–7.7)
NEUTROPHILS NFR BLD AUTO: 70 %
PH UR: 5.5 [PH] (ref 5–8)
PLATELET # BLD AUTO: 180 K/UL (ref 150–450)
POTASSIUM SERPL-SCNC: 4.4 MMOL/L (ref 3.5–5.1)
PROT SERPL-MCNC: 6.3 G/DL (ref 6.3–8.2)
PROT UR QL: (no result)
RBC UR QL: 3 /HPF (ref 0–5)
SODIUM SERPL-SCNC: 137 MMOL/L (ref 137–145)
SP GR UR: 1.02 (ref 1–1.03)
UROBILINOGEN UR QL STRIP: 2 MG/DL (ref ?–2)
WBC # BLD AUTO: 5.8 K/UL (ref 3.8–10.6)
WBC #/AREA URNS HPF: 4 /HPF (ref 0–5)
WBC CASTS #/AREA URNS LPF: 1 /LPF

## 2018-08-03 PROCEDURE — 81001 URINALYSIS AUTO W/SCOPE: CPT

## 2018-08-03 PROCEDURE — 80053 COMPREHEN METABOLIC PANEL: CPT

## 2018-08-03 PROCEDURE — 96361 HYDRATE IV INFUSION ADD-ON: CPT

## 2018-08-03 PROCEDURE — 74177 CT ABD & PELVIS W/CONTRAST: CPT

## 2018-08-03 PROCEDURE — 87086 URINE CULTURE/COLONY COUNT: CPT

## 2018-08-03 PROCEDURE — 83605 ASSAY OF LACTIC ACID: CPT

## 2018-08-03 PROCEDURE — 36415 COLL VENOUS BLD VENIPUNCTURE: CPT

## 2018-08-03 PROCEDURE — 96375 TX/PRO/DX INJ NEW DRUG ADDON: CPT

## 2018-08-03 PROCEDURE — 96366 THER/PROPH/DIAG IV INF ADDON: CPT

## 2018-08-03 PROCEDURE — 82150 ASSAY OF AMYLASE: CPT

## 2018-08-03 PROCEDURE — 83690 ASSAY OF LIPASE: CPT

## 2018-08-03 PROCEDURE — 99285 EMERGENCY DEPT VISIT HI MDM: CPT

## 2018-08-03 PROCEDURE — 85025 COMPLETE CBC W/AUTO DIFF WBC: CPT

## 2018-08-03 PROCEDURE — 83036 HEMOGLOBIN GLYCOSYLATED A1C: CPT

## 2018-08-03 PROCEDURE — 96365 THER/PROPH/DIAG IV INF INIT: CPT

## 2018-08-03 RX ADMIN — ATORVASTATIN CALCIUM SCH MG: 20 TABLET, FILM COATED ORAL at 20:50

## 2018-08-03 RX ADMIN — METRONIDAZOLE SCH MLS/HR: 500 INJECTION, SOLUTION INTRAVENOUS at 22:40

## 2018-08-03 RX ADMIN — I-VITE, TAB 1000-60-2MG (60/BT) SCH EACH: TAB at 20:50

## 2018-08-03 RX ADMIN — LEVOFLOXACIN SCH: 750 INJECTION, SOLUTION INTRAVENOUS at 15:14

## 2018-08-03 RX ADMIN — METRONIDAZOLE SCH MLS/HR: 500 INJECTION, SOLUTION INTRAVENOUS at 18:12

## 2018-08-03 RX ADMIN — PANTOPRAZOLE SODIUM SCH MG: 40 TABLET, DELAYED RELEASE ORAL at 20:50

## 2018-08-03 NOTE — P.HPIM
History of Present Illness


H&P Date: 08/03/18


Chief Complaint: abdominal pain





this is a pleasant 85-year-old gentleman patient of Dr. Gil.  He has 

underlying history of hyperlipidemia hypertension hypothyroidism P VD, CAD, COPD

, severe GERD, squamous cell carcinoma left lung, with wedge resection of mass 

from the left lingula August 2017 admitted to the emergency room secondary to 

abdominal pain and diarrhea.patient has had symptoms of approximately 3-4 

dayswith worsening of symptoms, has persistence of diarrhea, no sick contacts 

patient denies any fever, has some nausea without vomiting, and was 

subsequently seen in the emergency room.patient denies any previous C. diff 

colitis in the past





Emergency room, white cell count of 5.8,hemoglobin of 12.5, creatinine of 1.0, 

liver function test is unremarkable, urinalysis was negative for hematuria, has 

3 rbc's, 4 wbc's, and hyaline casts,CAT scan of the abdomen and pelvis shows 

fatty liver, no hepatic masses noted however evaluation is limited, mild 

pancreatic atrophy,with short segment sigmoid diverticulitis noted, findings on 

the transverse ascending and splenic flexure: Without any pneumoperitoneum or 

pericolonic fluid collection to suggest abscess.  There is also an increasing 

size of the known mediastinal adenopathy seen as previous CT July 2017 now 

measuring 2.7 cm.  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 

well as proximal internal iliac arteries with fem-fem bypass there is also 

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  Patient 

currently is admitted for diverticulitis, stools has been sent for C. diff 

toxin evaluation patient started on IV Levaquin and I be the Flagyl stools 

currently pending





Review of Systems


Constitutional: Reports as per HPI, Reports anorexia, Reports chills, Reports 

fatigue, Denies chronic headaches, Denies chronic pain, Denies daytime 

sleepiness, Denies fever, Denies lethargy, Denies malaise, Denies night sweats, 

Denies poor appetite, Denies sweats, Denies weakness, Denies weight gain, 

Denies weight loss


Ears, nose, mouth and throat: Reports as per HPI, Denies ant. neck pain, Denies 

bleeding gums, Denies dental pain, Denies dysphagia, Denies epistaxis, Denies 

headache, Denies hoarseness, Denies mouth pain, Denies nasal congestion, Denies 

nasal discharge, Denies neck fullness/pressure, Denies neck lump, Denies nose 

pain, Denies odynophagia, Denies post-nasal drip, Denies sinus pain, Denies 

sinus pressure, Denies swelling in mouth, Denies swelling in throat, Denies 

sore throat, Denies vertigo, Denies voice changes


Cardiovascular: Reports as per HPI, Denies chest pain, Denies claudication, 

Denies decreased exercise tolerance, Denies dyspnea on exertion, Denies edema, 

Denies high blood pressure, Denies irregular heart beat, Denies leg edema, 

Denies lightheadedness, Denies orthopnea, Denies palpitations, Denies 

paroxysmal nocturnal dyspnea, Denies phlebitis, Denies rapid heart beat, Denies 

shortness of breath, Denies syncope


Respiratory: Reports as per HPI, Denies congestion, Denies cough, Denies cough 

with sputum, Denies dyspnea, Denies excessive sputum, Denies hemoptysis, Denies 

home oxygen, Denies pain, Denies pain on inspiration, Denies pleurisy, Denies 

respiratory infections, Denies sleep apnea, Denies snoring, Denies wheezing


Gastrointestinal: Reports as per HPI, Reports abdominal pain, Reports diarrhea, 

Reports indigestion, Reports nausea, Denies belching, Denies bloating, Denies 

BRBPR, Denies change in bowel habits, Denies coffee ground emesis, Denies 

constipation, Denies dyspepsia, Denies early satiety, Denies excessive gas, 

Denies heartburn, Denies hematemesis, Denies hematochezia, Denies jaundice, 

Denies lactose intolerance, Denies loss of appetite, Denies melena, Denies 

vomiting


Genitourinary: Reports as per HPI, Denies decreased libido, Denies difficulties 

fathering child, Denies discharge, Denies dysuria, Denies erectile dysfunction, 

Denies flank pain, Denies genital pain, Denies genital sores, Denies hematuria, 

Denies impotence, Denies incontinence, Denies kidney stones, Denies nocturia, 

Denies polyuria, Denies testicular lump, Denies testicular pain, Denies urinary 

frequency, Denies urinary hesitancy, Denies urinary retention


Musculoskeletal: Reports as per HPI


Integumentary: Reports as per HPI, Denies acne, Denies boils, Denies brittle 

nails, Denies change in hair/nails, Denies color changes, Denies darkening of 

skin, Denies depigmentation, Denies dryness, Denies foot/leg ulcers, Denies 

growths, Denies hirsutism, Denies lesions, Denies onychomycosis, Denies pruritus

, Denies rash, Denies sores, Denies striae, Denies unusual bruising, Denies 

wounds


Neurological: Reports as per HPI, Denies aphasia, Denies ataxia, Denies balance 

difficulties, Denies burning pain, Denies change in mentation, Denies change in 

smell/taste, Denies change in speech, Denies confusion, Denies convulsions, 

Denies double vision, Denies gait dysfunction, Denies head injury, Denies 

headaches, Denies hearing difficulties, Denies lack of coordination, Denies 

loss of vision, Denies memory loss, Denies migraines, Denies motor disturbance, 

Denies numbness, Denies paralysis, Denies paresthesias, Denies seizures, Denies 

sensory deficit, Denies spasticity, Denies syncope, Denies tic, Denies tingling

, Denies transient paralysis, Denies tremors, Denies vertigo, Denies weakness, 

Denies visual changes


Psychiatric: Reports as per HPI, Denies anhedonia, Denies anxiety, Denies 

anxiety attacks, Denies change in appetite, Denies change in libido, Denies 

change in sleep habits, Denies confusion, Denies depression, Denies difficulty 

concentrating, Denies disorientation, Denies hallucinations, Denies hopelessness

, Denies hypersomnia, Denies insomnia, Denies irritability, Denies memory loss, 

Denies mood swings, Denies paranoia, Denies sadness/tearfulness, Denies sleep 

disturbances, Denies suicidal ideation


Endocrine: Reports as per HPI


Hematologic/Lymphatic: Reports as per HPI


Allergic/Immunologic: Reports as per HPI





Past Medical History


Past Medical History: Cancer, COPD, Eye Disorder, GERD/Reflux, Hyperlipidemia, 

Hypertension, Thyroid Disorder


Additional Past Medical History / Comment(s): MACULAR DEGENERATION- POOR VISION.

, STATES IRREGULAR HEART BEAT, dx. lung cancer Sept. 2017, diverticular disease

, bells palsy, had pne vaccine few years ago  not sure of ran, write unable to 

verify date at time of admit.


History of Any Multi-Drug Resistant Organisms: None Reported


Past Surgical History: Appendectomy, Cholecystectomy, Heart Catheterization, 

Orthopedic Surgery


Additional Past Surgical History / Comment(s): several heart cath's, Bifemoral 

bypass grafting (1980's), wrist surg., wedge resection lung in Sept, 2017, 

bronch w/bx  12-15-17


Past Anesthesia/Blood Transfusion Reactions: No Reported Reaction


Smoking Status: Former smoker





- Past Family History


  ** Mother


Family Medical History: CVA/TIA, Hypertension





  ** Father


Family Medical History: Cancer


Additional Family Medical History / Comment(s): CANCER OF LIP





Medications and Allergies


 Home Medications











 Medication  Instructions  Recorded  Confirmed  Type


 


Aspirin EC [Ecotrin] 325 mg PO DAILY 05/28/15 08/03/18 History


 


Isosorbide Mononitrate ER [Imdur] 30 mg PO DAILY 05/28/15 08/03/18 History


 


Omeprazole [PriLOSEC] 20 mg PO BID 05/28/15 08/03/18 History


 


Temazepam [Restoril] 15 mg PO HS PRN 05/28/15 08/03/18 History


 


Fludrocortisone [Florinef] 0.05 mg PO DAILY 03/04/17 08/03/18 History


 


Simvastatin [Zocor] 40 mg PO HS 03/04/17 08/03/18 History


 


Carvedilol [Coreg] 12.5 mg PO BID 08/29/17 08/03/18 History


 


Ipratropium/Albuterol Sulfate 1 puff INHALATION RT-QID PRN 08/29/17 08/03/18 

History





[Combivent Respimat Inhaler]    


 


Vit C/E/Zn/Coppr/Lutein/Zeaxan 1 cap PO BID 07/17/18 08/03/18 History





[Preservision Areds 2 Softgel]    


 


Levothyroxine Sodium [Synthroid] 125 mcg PO DAILY 08/03/18 08/03/18 History











 Allergies











Allergy/AdvReac Type Severity Reaction Status Date / Time


 


povidone-iodine Allergy Unknown Red Skin Verified 08/03/18 14:55





[From Betadine]     


 


soap [From Betadine] Allergy Unknown Red Skin Verified 08/03/18 14:55














Physical Exam


Vitals: 


 Vital Signs











  Temp Pulse Resp BP Pulse Ox


 


 08/03/18 14:54   71  16  129/63  95


 


 08/03/18 11:34  97.4 F L  77  18  105/62  95








 Intake and Output











 08/03/18 08/03/18 08/03/18





 06:59 14:59 22:59


 


Other:   


 


  Weight  81.647 kg 














- Constitutional


General appearance: average body habitus, cooperative, no acute distress





- EENT


Eyes: anicteric sclerae, EOMI, PERRLA, dentition normal


ENT: hearing grossly normal, normal oropharynx





- Neck


Neck: normal ROM





- Respiratory


Respiratory: bilateral: CTA, negative: diminished





- Cardiovascular


Rhythm: regular


Heart sounds: normal: S1, S2


Abnormal Heart Sounds: systolic murmur, no diastolic murmur, no rub, no S3 

Gallop, no S4 Gallop, no click, no other





- Gastrointestinal


General gastrointestinal: decreased bowel sounds, soft, tenderness





- Integumentary


Integumentary: decreased turgor, normal





- Neurologic


Neurologic: CNII-XII intact





- Musculoskeletal


Musculoskeletal: gait normal, strength equal bilaterally





- Psychiatric


Psychiatric: A&O x's 3, appropriate affect, intact judgment & insight





Results


CBC & Chem 7: 


 08/03/18 11:55





 08/03/18 11:55


Labs: 


 Abnormal Lab Results - Last 24 Hours (Table)











  08/03/18 08/03/18 08/03/18 Range/Units





  11:55 11:55 12:00 


 


RBC   4.17 L   (4.30-5.90)  m/uL


 


Hgb   12.5 L   (13.0-17.5)  gm/dL


 


Hct   37.6 L   (39.0-53.0)  %


 


Lymphocytes #   0.8 L   (1.0-4.8)  k/uL


 


Glucose  143 H    (74-99)  mg/dL


 


Amylase  <30 L    ()  U/L


 


Urine Protein    1+ H  (Negative)  


 


Urine Bacteria    Rare H  (None)  /hpf


 


Hyaline Casts    44 H  (0-2)  /lpf


 


Urine Mucus    Few H  (None)  /hpf








 Laboratory Results











WBC  5.8 k/uL (3.8-10.6)   08/03/18  11:55    


 


RBC  4.17 m/uL (4.30-5.90)  L  08/03/18  11:55    


 


Hgb  12.5 gm/dL (13.0-17.5)  L  08/03/18  11:55    


 


Hct  37.6 % (39.0-53.0)  L  08/03/18  11:55    


 


MCV  90.1 fL (80.0-100.0)   08/03/18  11:55    


 


MCH  29.9 pg (25.0-35.0)   08/03/18  11:55    


 


MCHC  33.3 g/dL (31.0-37.0)   08/03/18  11:55    


 


RDW  15.1 % (11.5-15.5)   08/03/18  11:55    


 


Plt Count  180 k/uL (150-450)   08/03/18  11:55    


 


Neutrophils %  70 %  08/03/18  11:55    


 


Lymphocytes %  13 %  08/03/18  11:55    


 


Monocytes %  9 %  08/03/18  11:55    


 


Eosinophils %  3 %  08/03/18  11:55    


 


Basophils %  1 %  08/03/18  11:55    


 


Neutrophils #  4.1 k/uL (1.3-7.7)   08/03/18  11:55    


 


Lymphocytes #  0.8 k/uL (1.0-4.8)  L  08/03/18  11:55    


 


Monocytes #  0.5 k/uL (0-1.0)   08/03/18  11:55    


 


Eosinophils #  0.2 k/uL (0-0.7)   08/03/18  11:55    


 


Basophils #  0.0 k/uL (0-0.2)   08/03/18  11:55    


 


Sodium  137 mmol/L (137-145)   08/03/18  11:55    


 


Potassium  4.4 mmol/L (3.5-5.1)   08/03/18  11:55    


 


Chloride  106 mmol/L ()   08/03/18  11:55    


 


Carbon Dioxide  23 mmol/L (22-30)   08/03/18  11:55    


 


Anion Gap  8 mmol/L  08/03/18  11:55    


 


BUN  15 mg/dL (9-20)   08/03/18  11:55    


 


Creatinine  1.00 mg/dL (0.66-1.25)   08/03/18  11:55    


 


Est GFR (CKD-EPI)AfAm  79  (>60 ml/min/1.73 sqM)   08/03/18  11:55    


 


Est GFR (CKD-EPI)NonAf  68  (>60 ml/min/1.73 sqM)   08/03/18  11:55    


 


Glucose  143 mg/dL (74-99)  H  08/03/18  11:55    


 


Plasma Lactic Acid Scott  1.5 mmol/L (0.7-2.0)   08/03/18  11:55    


 


Calcium  8.8 mg/dL (8.4-10.2)   08/03/18  11:55    


 


Total Bilirubin  0.9 mg/dL (0.2-1.3)   08/03/18  11:55    


 


AST  27 U/L (17-59)   08/03/18  11:55    


 


ALT  32 U/L (21-72)   08/03/18  11:55    


 


Alkaline Phosphatase  102 U/L ()   08/03/18  11:55    


 


Total Protein  6.3 g/dL (6.3-8.2)   08/03/18  11:55    


 


Albumin  3.5 g/dL (3.5-5.0)   08/03/18  11:55    


 


Amylase  <30 U/L ()  L  08/03/18  11:55    


 


Lipase  40 U/L ()   08/03/18  11:55    


 


Urine Color  Yellow   08/03/18  12:00    


 


Urine Appearance  Clear  (Clear)   08/03/18  12:00    


 


Urine pH  5.5  (5.0-8.0)   08/03/18  12:00    


 


Ur Specific Gravity  1.021  (1.001-1.035)   08/03/18  12:00    


 


Urine Protein  1+  (Negative)  H  08/03/18  12:00    


 


Urine Glucose (UA)  Negative  (Negative)   08/03/18  12:00    


 


Urine Ketones  Negative  (Negative)   08/03/18  12:00    


 


Urine Blood  Negative  (Negative)   08/03/18  12:00    


 


Urine Nitrite  Negative  (Negative)   08/03/18  12:00    


 


Urine Bilirubin  Negative  (Negative)   08/03/18  12:00    


 


Urine Urobilinogen  2.0 mg/dL (<2.0)   08/03/18  12:00    


 


Ur Leukocyte Esterase  Negative  (Negative)   08/03/18  12:00    


 


Urine RBC  3 /hpf (0-5)   08/03/18  12:00    


 


Urine WBC  4 /hpf (0-5)   08/03/18  12:00    


 


Urine Bacteria  Rare /hpf (None)  H  08/03/18  12:00    


 


Hyaline Casts  44 /lpf (0-2)  H  08/03/18  12:00    


 


WBC Casts  1 /lpf (0)   08/03/18  12:00    


 


Urine Mucus  Few /hpf (None)  H  08/03/18  12:00    














Thrombosis Risk Factor Assmnt





- DVT/VTE Prophylaxis


DVT/VTE Prophylaxis: Pharmacologic Prophylaxis ordered





- Choose All That Apply


Each Risk Factor Represents 2 Points: Age 61-74 years


Thrombosis Risk Factor Assessment Total Risk Factor Score: 2


Thrombosis Risk Factor Assessment Level: Low Risk





Assessment and Plan


Plan: 





1. acute abdominal pain with diverticulitis, presenting with diarrhea, and 

dehydration.  Patient is on Levaquin and Flagyl, IV treatments, IV fluid, 

stools to be sent for C. diff toxin, electric to be replace, monitor CBC, no 

evidence of acute peritonitis or small bowel obstruction, or small bowel 

perforation, continue to monitor, patient might need general surgeon should 

this happen.  Bowel rest with clear liquid diet, advance as tolerated








2.  Historyleft lung squamous cell carcinoma with wedge resection Status post 

thoracotomy and wedge resection of lingular mass. last 09/07/2017 pathology sent

,    There is also some non-caseating granulomas which could be consistent with 

sarcoidosis.   





3.  CAD. Known to have coronary disease from previous heart cath with medical 

management, stable, asymptomatic on Imdur 30 daily, Coreg, Lipitor, Norvasc asa 

325 daily





4. COPD: No Current symptoms. Continue patient on updrafts of albuterol/

ipratropium.





5. Hypertension: Continue Carvedilol 12.5 mg g twice a day, Amlodipine 10 mg 

daily, and Imdur 30 mg daily





6. Hyperlipidemia:Zocor 40 at home  initial meds





7. Hypothyroidism:  levothyroxine increased 125 g daily. no changes





8. Severe GERD: On Protonix 40 mg daily





9. Chronic supplementation with Florinef 0.05 mg daily possibly for autonomic 

dysfunction rather than adrenal insufficiency.





12.  Mild anemia possibly blood loss related, monitor for decline, CBC is 

stable. 





13. CKD stage II. Nephrotoxins will be avoided, will continue to monitor CMP 

closely.





14. Impaired random blood sugars suspect diabetes mellitus type 2 last, 

hemoglobin A1c known at7.4.we'll get a new level XLs








GI prophylaxis Protonix 40mg QD 





DVT prophylaxis with subcutaneous heparin and GIL hose

## 2018-08-03 NOTE — ED
General Adult HPI





- General


Chief complaint: Abdominal Pain


Stated complaint: Diarrhea


Time Seen by Provider: 08/03/18 11:39


Source: patient, RN notes reviewed, old records reviewed


Mode of arrival: ambulatory


Limitations: no limitations





- History of Present Illness


Initial comments: 





This is a 85-year-old male to the ER for evaluation.  This patient has a for 

evaluation of bowel pain.  Patient is had recent diarrheal episodes, diarrhea 

has been persistent.  No recent travel history no known sick contacts.  Patient 

with persistent abdominal pain.  Again no fevers.  Mild nausea no vomiting.  No 

significant history of abdominal surgery.  No change in medications





- Related Data


 Home Medications











 Medication  Instructions  Recorded  Confirmed


 


Aspirin EC [Ecotrin] 325 mg PO DAILY 05/28/15 08/03/18


 


Isosorbide Mononitrate ER [Imdur] 30 mg PO DAILY 05/28/15 08/03/18


 


Omeprazole [PriLOSEC] 20 mg PO BID 05/28/15 08/03/18


 


Temazepam [Restoril] 15 mg PO HS PRN 05/28/15 08/03/18


 


Fludrocortisone [Florinef] 0.05 mg PO DAILY 03/04/17 08/03/18


 


Simvastatin [Zocor] 40 mg PO HS 03/04/17 08/03/18


 


Carvedilol [Coreg] 12.5 mg PO BID 08/29/17 08/03/18


 


Ipratropium/Albuterol Sulfate 1 puff INHALATION RT-QID PRN 08/29/17 08/03/18





[Combivent Respimat Inhaler]   


 


Vit C/E/Zn/Coppr/Lutein/Zeaxan 1 cap PO BID 07/17/18 08/03/18





[Preservision Areds 2 Softgel]   


 


Levothyroxine Sodium [Synthroid] 125 mcg PO DAILY 08/03/18 08/03/18











 Allergies











Allergy/AdvReac Type Severity Reaction Status Date / Time


 


povidone-iodine Allergy Unknown Red Skin Verified 08/03/18 14:55





[From Betadine]     


 


soap [From Betadine] Allergy Unknown Red Skin Verified 08/03/18 14:55














Review of Systems


ROS Statement: 


Those systems with pertinent positive or pertinent negative responses have been 

documented in the HPI.





ROS Other: All systems not noted in ROS Statement are negative.





Past Medical History


Past Medical History: Cancer, COPD, Eye Disorder, GERD/Reflux, Hyperlipidemia, 

Hypertension, Thyroid Disorder


Additional Past Medical History / Comment(s): MACULAR DEGENERATION- POOR VISION.

, STATES IRREGULAR HEART BEAT, dx. lung cancer Sept. 2017


History of Any Multi-Drug Resistant Organisms: None Reported


Past Surgical History: Appendectomy, Cholecystectomy, Heart Catheterization, 

Orthopedic Surgery


Additional Past Surgical History / Comment(s): several heart cath's, Bifemoral 

bypass grafting (1980's), wrist surg., wedge resection lung in Sept, 2017


Past Anesthesia/Blood Transfusion Reactions: No Reported Reaction


Past Psychological History: No Psychological Hx Reported


Smoking Status: Former smoker


Past Alcohol Use History: None Reported


Past Drug Use History: None Reported





- Past Family History


  ** Mother


Family Medical History: CVA/TIA, Hypertension





  ** Father


Family Medical History: Cancer


Additional Family Medical History / Comment(s): CANCER OF LIP





General Exam


Limitations: no limitations


General appearance: alert, in no apparent distress


Head exam: Present: atraumatic, normocephalic, normal inspection


Eye exam: Present: normal appearance, PERRL, EOMI.  Absent: scleral icterus, 

conjunctival injection, periorbital swelling


ENT exam: Present: normal exam, mucous membranes moist


Neck exam: Present: normal inspection.  Absent: tenderness, meningismus, 

lymphadenopathy


Respiratory exam: Present: normal lung sounds bilaterally.  Absent: respiratory 

distress, wheezes, rales, rhonchi, stridor


Cardiovascular Exam: Present: regular rate, normal rhythm, normal heart sounds.

  Absent: systolic murmur, diastolic murmur, rubs, gallop, clicks


GI/Abdominal exam: Present: soft, normal bowel sounds.  Absent: distended, 

tenderness, guarding, rebound, rigid


Extremities exam: Present: normal inspection, full ROM, normal capillary 

refill.  Absent: tenderness, pedal edema, joint swelling, calf tenderness


Back exam: Present: normal inspection


Neurological exam: Present: alert, oriented X3, CN II-XII intact


Psychiatric exam: Present: normal affect, normal mood


Skin exam: Present: warm, dry, intact, normal color.  Absent: rash





Course


 Vital Signs











  08/03/18 08/03/18





  11:34 14:54


 


Temperature 97.4 F L 


 


Pulse Rate 77 71


 


Respiratory 18 16





Rate  


 


Blood Pressure 105/62 129/63


 


O2 Sat by Pulse 95 95





Oximetry  














Medical Decision Making





- Medical Decision Making





85 male the ER for evaluation diarrhea, positive diverticulitis and colitis and 

CAT scan.  Patient be admitted for nothing by mouth IV fluids





- Lab Data


Result diagrams: 


 08/03/18 11:55





 08/03/18 11:55


 Lab Results











  08/03/18 08/03/18 08/03/18 Range/Units





  11:55 11:55 11:55 


 


WBC   5.8   (3.8-10.6)  k/uL


 


RBC   4.17 L   (4.30-5.90)  m/uL


 


Hgb   12.5 L   (13.0-17.5)  gm/dL


 


Hct   37.6 L   (39.0-53.0)  %


 


MCV   90.1   (80.0-100.0)  fL


 


MCH   29.9   (25.0-35.0)  pg


 


MCHC   33.3   (31.0-37.0)  g/dL


 


RDW   15.1   (11.5-15.5)  %


 


Plt Count   180   (150-450)  k/uL


 


Neutrophils %   70   %


 


Lymphocytes %   13   %


 


Monocytes %   9   %


 


Eosinophils %   3   %


 


Basophils %   1   %


 


Neutrophils #   4.1   (1.3-7.7)  k/uL


 


Lymphocytes #   0.8 L   (1.0-4.8)  k/uL


 


Monocytes #   0.5   (0-1.0)  k/uL


 


Eosinophils #   0.2   (0-0.7)  k/uL


 


Basophils #   0.0   (0-0.2)  k/uL


 


Sodium  137    (137-145)  mmol/L


 


Potassium  4.4    (3.5-5.1)  mmol/L


 


Chloride  106    ()  mmol/L


 


Carbon Dioxide  23    (22-30)  mmol/L


 


Anion Gap  8    mmol/L


 


BUN  15    (9-20)  mg/dL


 


Creatinine  1.00    (0.66-1.25)  mg/dL


 


Est GFR (CKD-EPI)AfAm  79    (>60 ml/min/1.73 sqM)  


 


Est GFR (CKD-EPI)NonAf  68    (>60 ml/min/1.73 sqM)  


 


Glucose  143 H    (74-99)  mg/dL


 


Plasma Lactic Acid Scott    1.5  (0.7-2.0)  mmol/L


 


Calcium  8.8    (8.4-10.2)  mg/dL


 


Total Bilirubin  0.9    (0.2-1.3)  mg/dL


 


AST  27    (17-59)  U/L


 


ALT  32    (21-72)  U/L


 


Alkaline Phosphatase  102    ()  U/L


 


Total Protein  6.3    (6.3-8.2)  g/dL


 


Albumin  3.5    (3.5-5.0)  g/dL


 


Amylase  <30 L    ()  U/L


 


Lipase  40    ()  U/L


 


Urine Color     


 


Urine Appearance     (Clear)  


 


Urine pH     (5.0-8.0)  


 


Ur Specific Gravity     (1.001-1.035)  


 


Urine Protein     (Negative)  


 


Urine Glucose (UA)     (Negative)  


 


Urine Ketones     (Negative)  


 


Urine Blood     (Negative)  


 


Urine Nitrite     (Negative)  


 


Urine Bilirubin     (Negative)  


 


Urine Urobilinogen     (<2.0)  mg/dL


 


Ur Leukocyte Esterase     (Negative)  


 


Urine RBC     (0-5)  /hpf


 


Urine WBC     (0-5)  /hpf


 


Urine Bacteria     (None)  /hpf


 


Hyaline Casts     (0-2)  /lpf


 


WBC Casts     (0)  /lpf


 


Urine Mucus     (None)  /hpf














  08/03/18 Range/Units





  12:00 


 


WBC   (3.8-10.6)  k/uL


 


RBC   (4.30-5.90)  m/uL


 


Hgb   (13.0-17.5)  gm/dL


 


Hct   (39.0-53.0)  %


 


MCV   (80.0-100.0)  fL


 


MCH   (25.0-35.0)  pg


 


MCHC   (31.0-37.0)  g/dL


 


RDW   (11.5-15.5)  %


 


Plt Count   (150-450)  k/uL


 


Neutrophils %   %


 


Lymphocytes %   %


 


Monocytes %   %


 


Eosinophils %   %


 


Basophils %   %


 


Neutrophils #   (1.3-7.7)  k/uL


 


Lymphocytes #   (1.0-4.8)  k/uL


 


Monocytes #   (0-1.0)  k/uL


 


Eosinophils #   (0-0.7)  k/uL


 


Basophils #   (0-0.2)  k/uL


 


Sodium   (137-145)  mmol/L


 


Potassium   (3.5-5.1)  mmol/L


 


Chloride   ()  mmol/L


 


Carbon Dioxide   (22-30)  mmol/L


 


Anion Gap   mmol/L


 


BUN   (9-20)  mg/dL


 


Creatinine   (0.66-1.25)  mg/dL


 


Est GFR (CKD-EPI)AfAm   (>60 ml/min/1.73 sqM)  


 


Est GFR (CKD-EPI)NonAf   (>60 ml/min/1.73 sqM)  


 


Glucose   (74-99)  mg/dL


 


Plasma Lactic Acid Scott   (0.7-2.0)  mmol/L


 


Calcium   (8.4-10.2)  mg/dL


 


Total Bilirubin   (0.2-1.3)  mg/dL


 


AST   (17-59)  U/L


 


ALT   (21-72)  U/L


 


Alkaline Phosphatase   ()  U/L


 


Total Protein   (6.3-8.2)  g/dL


 


Albumin   (3.5-5.0)  g/dL


 


Amylase   ()  U/L


 


Lipase   ()  U/L


 


Urine Color  Yellow  


 


Urine Appearance  Clear  (Clear)  


 


Urine pH  5.5  (5.0-8.0)  


 


Ur Specific Gravity  1.021  (1.001-1.035)  


 


Urine Protein  1+ H  (Negative)  


 


Urine Glucose (UA)  Negative  (Negative)  


 


Urine Ketones  Negative  (Negative)  


 


Urine Blood  Negative  (Negative)  


 


Urine Nitrite  Negative  (Negative)  


 


Urine Bilirubin  Negative  (Negative)  


 


Urine Urobilinogen  2.0  (<2.0)  mg/dL


 


Ur Leukocyte Esterase  Negative  (Negative)  


 


Urine RBC  3  (0-5)  /hpf


 


Urine WBC  4  (0-5)  /hpf


 


Urine Bacteria  Rare H  (None)  /hpf


 


Hyaline Casts  44 H  (0-2)  /lpf


 


WBC Casts  1  (0)  /lpf


 


Urine Mucus  Few H  (None)  /hpf














- Radiology Data


Radiology results: report reviewed (CTA abdomen pelvis positive for colitis and 

diverticulitis no abscess), image reviewed





Disposition


Clinical Impression: 


 Diverticulitis, Colitis





Disposition: ADMITTED AS IP TO THIS [facility]


Condition: Good


Is patient prescribed a controlled substance at d/c from ED?: No


Referrals: 


Sonido Weber MD [Primary Care Provider] - 1-2 days

## 2018-08-03 NOTE — CT
EXAMINATION TYPE: CT abdomen pelvis w con

 

DATE OF EXAM: 8/3/2018

 

COMPARISON: PET/CT dated 7/29/2017 and CT abdomen pelvis dated 6/26/2017

 

HISTORY: Diarrhea x 4 days.

 

CT DLP: 1010.6 mGycm

Automated exposure control for dose reduction was used.

 

TECHNIQUE:  Helical acquisition of images was performed from the lung bases through the pelvis.

 

CONTRAST: 

Performed without Oral Contrast and with IV Contrast, patient injected with 100 mL of Isovue M300.

 

FINDINGS: 

 

LUNG BASES: Right paraesophageal adenopathy and left infrahilar adenopathy has enlarged from the prio
r PET/CT now measuring 2.7 cm in short axis and 1.4 cm in short axis respectively

 

LIVER/GB: Hepatic parenchyma is diffusely hypoattenuated in comparison to that of the spleen, most co
mmonly seen in hepatic steatosis. This finding limits evaluation for hepatic masses. No gross evidenc
e of hepatic mass is seen. No intrahepatic biliary ductal dilatation. Gallbladder surgically absent.

 

PANCREAS: Mild pancreatic atrophy without ductal dilatation.

 

SPLEEN: No splenomegaly.

 

ADRENALS: No significant abnormality is seen.

 

KIDNEYS: Right upper pole 1.5 cm renal cyst is present. Otherwise kidneys enhance symmetrically witho
ut hydronephrosise.

 

FREE AIR:  No free air is visualized.

 

ADENOPATHY:  No greater than 1 cm short axis lymph nodes are seen within the abdomen or pelvis.

 

URINARY BLADDER: There is a small right lateral urinary bladder diverticulum on series 3 image 78.

 

OSSEOUS STRUCTURES:  Degenerative change of the right sacroiliac joint is similar to the prior. Multi
level degenerative change of the spine is again seen. Mild levoscoliotic curvature of the thoracolumb
ar spine is also noted.

 

BOWEL:  Sigmoid colon is decompressed approximately. There is very mild pericolonic fat stranding redd
rounding few sigmoid colon diverticula compatible with mild acute uncomplicated sigmoid diverticulosi
s. Additionally air-fluid levels are seen within the colon proximally in the transverse colon as well
 as ascending colon appears to have mucosal hyperemia and prominent surrounding vasa recta also sugge
stive of colitis.

 

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
rteries with femoral-femoral bypass. 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
femoral bypass anastomosis is similar in size. This was described on the exam of 6/26/2017.

 

IMPRESSION: 

1. FINDINGS SUGGEST BOTH LONG SEGMENT TRANSVERSE COLON, ASCENDING COLON, AND SPLENIC FLEXURE COLITIS 
AS WELL AS ACUTE UNCOMPLICATED SHORT SEGMENT SIGMOID DIVERTICULITIS. NO PERICOLONIC FLUID COLLECTION 
TO SUGGEST ABSCESS OR PNEUMOPERITONEUM.

2. INCREASING SIZE OF THE KNOWN MEDIASTINAL ADENOPATHY SEEN ON PRIOR PET/CT OF 7/29/2017.

## 2018-08-04 LAB
ALBUMIN SERPL-MCNC: 3.3 G/DL (ref 3.5–5)
ALP SERPL-CCNC: 95 U/L (ref 38–126)
ALT SERPL-CCNC: 36 U/L (ref 21–72)
ANION GAP SERPL CALC-SCNC: 7 MMOL/L
AST SERPL-CCNC: 27 U/L (ref 17–59)
BASOPHILS # BLD AUTO: 0 K/UL (ref 0–0.2)
BASOPHILS NFR BLD AUTO: 0 %
BUN SERPL-SCNC: 16 MG/DL (ref 9–20)
CALCIUM SPEC-MCNC: 8.4 MG/DL (ref 8.4–10.2)
CHLORIDE SERPL-SCNC: 109 MMOL/L (ref 98–107)
CO2 SERPL-SCNC: 23 MMOL/L (ref 22–30)
EOSINOPHIL # BLD AUTO: 0 K/UL (ref 0–0.7)
EOSINOPHIL NFR BLD AUTO: 0 %
ERYTHROCYTE [DISTWIDTH] IN BLOOD BY AUTOMATED COUNT: 3.86 M/UL (ref 4.3–5.9)
ERYTHROCYTE [DISTWIDTH] IN BLOOD: 15.2 % (ref 11.5–15.5)
GLUCOSE SERPL-MCNC: 169 MG/DL (ref 74–99)
HBA1C MFR BLD: 8 % (ref 4–6)
HCT VFR BLD AUTO: 35.6 % (ref 39–53)
HGB BLD-MCNC: 11.7 GM/DL (ref 13–17.5)
LYMPHOCYTES # SPEC AUTO: 0.8 K/UL (ref 1–4.8)
LYMPHOCYTES NFR SPEC AUTO: 14 %
MCH RBC QN AUTO: 30.2 PG (ref 25–35)
MCHC RBC AUTO-ENTMCNC: 32.7 G/DL (ref 31–37)
MCV RBC AUTO: 92.1 FL (ref 80–100)
MONOCYTES # BLD AUTO: 0.3 K/UL (ref 0–1)
MONOCYTES NFR BLD AUTO: 5 %
NEUTROPHILS # BLD AUTO: 4.3 K/UL (ref 1.3–7.7)
NEUTROPHILS NFR BLD AUTO: 79 %
PLATELET # BLD AUTO: 164 K/UL (ref 150–450)
POTASSIUM SERPL-SCNC: 4.3 MMOL/L (ref 3.5–5.1)
PROT SERPL-MCNC: 5.9 G/DL (ref 6.3–8.2)
SODIUM SERPL-SCNC: 139 MMOL/L (ref 137–145)
WBC # BLD AUTO: 5.4 K/UL (ref 3.8–10.6)

## 2018-08-04 RX ADMIN — PANTOPRAZOLE SODIUM SCH MG: 40 TABLET, DELAYED RELEASE ORAL at 08:31

## 2018-08-04 RX ADMIN — ACETAMINOPHEN PRN MG: 325 TABLET, FILM COATED ORAL at 00:31

## 2018-08-04 RX ADMIN — ATORVASTATIN CALCIUM SCH MG: 20 TABLET, FILM COATED ORAL at 20:57

## 2018-08-04 RX ADMIN — ACETAMINOPHEN PRN MG: 325 TABLET, FILM COATED ORAL at 20:18

## 2018-08-04 RX ADMIN — CARVEDILOL SCH MG: 12.5 TABLET, FILM COATED ORAL at 15:08

## 2018-08-04 RX ADMIN — ASPIRIN 325 MG ORAL TABLET SCH MG: 325 PILL ORAL at 20:57

## 2018-08-04 RX ADMIN — METRONIDAZOLE SCH MLS/HR: 500 INJECTION, SOLUTION INTRAVENOUS at 23:45

## 2018-08-04 RX ADMIN — METRONIDAZOLE SCH MLS/HR: 500 INJECTION, SOLUTION INTRAVENOUS at 17:27

## 2018-08-04 RX ADMIN — ISOSORBIDE MONONITRATE SCH MG: 30 TABLET, EXTENDED RELEASE ORAL at 08:32

## 2018-08-04 RX ADMIN — LEVOTHYROXINE SODIUM SCH MCG: 0.12 TABLET ORAL at 06:12

## 2018-08-04 RX ADMIN — I-VITE, TAB 1000-60-2MG (60/BT) SCH EACH: TAB at 20:57

## 2018-08-04 RX ADMIN — METRONIDAZOLE SCH MLS/HR: 500 INJECTION, SOLUTION INTRAVENOUS at 08:31

## 2018-08-04 RX ADMIN — FLUDROCORTISONE ACETATE SCH MG: 0.1 TABLET ORAL at 08:32

## 2018-08-04 RX ADMIN — LEVOFLOXACIN SCH MLS/HR: 750 INJECTION, SOLUTION INTRAVENOUS at 14:56

## 2018-08-04 RX ADMIN — ENOXAPARIN SODIUM SCH MG: 40 INJECTION SUBCUTANEOUS at 08:31

## 2018-08-04 RX ADMIN — I-VITE, TAB 1000-60-2MG (60/BT) SCH EACH: TAB at 08:31

## 2018-08-04 RX ADMIN — PANTOPRAZOLE SODIUM SCH MG: 40 TABLET, DELAYED RELEASE ORAL at 20:57

## 2018-08-04 RX ADMIN — CARVEDILOL SCH MG: 12.5 TABLET, FILM COATED ORAL at 08:32

## 2018-08-04 NOTE — P.PN
Subjective


Progress Note Date: 08/04/18





this is a pleasant 85-year-old gentleman patient of Dr. Gil.  He has 

underlying history of hyperlipidemia hypertension hypothyroidism P VD, CAD, COPD

, severe GERD, squamous cell carcinoma left lung, with wedge resection of mass 

from the left lingula August 2017 admitted to the emergency room secondary to 

abdominal pain and diarrhea.patient has had symptoms of approximately 3-4 

dayswith worsening of symptoms, has persistence of diarrhea, no sick contacts 

patient denies any fever, has some nausea without vomiting, and was 

subsequently seen in the emergency room.patient denies any previous C. diff 

colitis in the past





Emergency room, white cell count of 5.8,hemoglobin of 12.5, creatinine of 1.0, 

liver function test is unremarkable, urinalysis was negative for hematuria, has 

3 rbc's, 4 wbc's, and hyaline casts,CAT scan of the abdomen and pelvis shows 

fatty liver, no hepatic masses noted however evaluation is limited, mild 

pancreatic atrophy,with short segment sigmoid diverticulitis noted, findings on 

the transverse ascending and splenic flexure: Without any pneumoperitoneum or 

pericolonic fluid collection to suggest abscess.  There is also an increasing 

size of the known mediastinal adenopathy seen as previous CT July 2017 now 

measuring 2.7 cm.  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 

well as proximal internal iliac arteries with fem-fem bypass there is also 

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  Patient 

currently is admitted for diverticulitis, stools has been sent for C. diff 

toxin evaluation patient started on IV Levaquin and I be the Flagyl stools 

currently pending





8/4 Patient examined at bedside. Patient denies any abdominal pain , or any 

episode of diarrhoea since yesterday. Initiated on clear liquid diet, tolersted 

well.  Gastroenterology consult for diverticulitis. 





Objective





- Vital Signs


Vital signs: 


 Vital Signs











Temp  97.5 F L  08/04/18 06:16


 


Pulse  91   08/04/18 06:16


 


Resp  18   08/04/18 06:16


 


BP  162/73   08/04/18 06:16


 


Pulse Ox  94 L  08/04/18 06:16








 Intake & Output











 08/03/18 08/04/18 08/04/18





 18:59 06:59 18:59


 


Weight 80.8 kg  


 


Other:   


 


  Voiding Method  Toilet 


 


  # Voids  2 














- Exam





- Constitutional


General appearance: average body habitus, cooperative, no acute distress





- EENT


Eyes: anicteric sclerae, EOMI, PERRLA, dentition normal


ENT: hearing grossly normal, normal oropharynx





- Neck


Neck: normal ROM





- Respiratory


Respiratory: bilateral: CTA, negative: diminished





- Cardiovascular


Rhythm: regular


Heart sounds: normal: S1, S2


Abnormal Heart Sounds: systolic murmur, no diastolic murmur, no rub, no S3 

Gallop, no S4 Gallop, no click, no other





- Gastrointestinal


General gastrointestinal: decreased bowel sounds, soft, tenderness on left abd 

on deep palpation





- Integumentary


Integumentary: decreased turgor, normal





- Neurologic


Neurologic: CNII-XII intact





- Musculoskeletal


Musculoskeletal: gait normal, strength equal bilaterally





- Psychiatric


Psychiatric: A&O x's 3, appropriate affect, intact judgment & insight





- Labs


CBC & Chem 7: 


 08/04/18 07:45





 08/04/18 07:45


Labs: 


 Abnormal Lab Results - Last 24 Hours (Table)











  08/04/18 08/04/18 Range/Units





  07:45 07:45 


 


RBC  3.86 L   (4.30-5.90)  m/uL


 


Hgb  11.7 L   (13.0-17.5)  gm/dL


 


Hct  35.6 L   (39.0-53.0)  %


 


Lymphocytes #  0.8 L   (1.0-4.8)  k/uL


 


Chloride   109 H  ()  mmol/L


 


Glucose   169 H  (74-99)  mg/dL


 


Total Protein   5.9 L  (6.3-8.2)  g/dL


 


Albumin   3.3 L  (3.5-5.0)  g/dL








 Microbiology - Last 24 Hours (Table)











 08/03/18 12:00 Urine Culture - Preliminary





 Urine,Voided 














Assessment and Plan


Plan: 





1. acute abdominal pain with diverticulitis, presenting with diarrhea, and 

dehydration.  Patient is on Levaquin and Flagyl, IV treatments, IV fluid, 

stools to be sent for C. diff toxin, electric to be replace, monitor CBC, no 

evidence of acute peritonitis or small bowel obstruction, or small bowel 

perforation, continue to monitor, patient might need general surgeon should 

this happen.  Bowel rest with clear liquid diet, advance as tolerated








2.  Historyleft lung squamous cell carcinoma with wedge resection Status post 

thoracotomy and wedge resection of lingular mass. last 09/07/2017 pathology sent

,    There is also some non-caseating granulomas which could be consistent with 

sarcoidosis.   





3.  CAD. Known to have coronary disease from previous heart cath with medical 

management, stable, asymptomatic on Imdur 30 daily, Coreg, Lipitor, Norvasc asa 

325 daily





4. COPD: No Current symptoms. Continue patient on updrafts of albuterol/

ipratropium.





5. Hypertension: Continue Carvedilol 12.5 mg g twice a day, Amlodipine 10 mg 

daily, and Imdur 30 mg daily





6. Hyperlipidemia:Zocor 40 at home  initial meds





7. Hypothyroidism:  levothyroxine increased 125 g daily. no changes





8. Severe GERD: On Protonix 40 mg daily





9. Chronic supplementation with Florinef 0.05 mg daily possibly for autonomic 

dysfunction rather than adrenal insufficiency.





12.  Mild anemia possibly blood loss related, monitor for decline, CBC is 

stable. 





13. CKD stage II. Nephrotoxins will be avoided, will continue to monitor CMP 

closely.





14. Impaired random blood sugars suspect diabetes mellitus type 2 last, 

hemoglobin A1c known at7.4.we'll get a new level XLs








GI prophylaxis Protonix 40mg QD 





DVT prophylaxis with subcutaneous heparin and GIL hose

## 2018-08-05 VITALS — RESPIRATION RATE: 16 BRPM

## 2018-08-05 LAB
ALBUMIN SERPL-MCNC: 2.9 G/DL (ref 3.5–5)
ALP SERPL-CCNC: 80 U/L (ref 38–126)
ALT SERPL-CCNC: 31 U/L (ref 21–72)
ANION GAP SERPL CALC-SCNC: 6 MMOL/L
AST SERPL-CCNC: 25 U/L (ref 17–59)
BASOPHILS # BLD AUTO: 0 K/UL (ref 0–0.2)
BASOPHILS NFR BLD AUTO: 1 %
BUN SERPL-SCNC: 16 MG/DL (ref 9–20)
CALCIUM SPEC-MCNC: 8.1 MG/DL (ref 8.4–10.2)
CHLORIDE SERPL-SCNC: 111 MMOL/L (ref 98–107)
CO2 SERPL-SCNC: 22 MMOL/L (ref 22–30)
EOSINOPHIL # BLD AUTO: 0.1 K/UL (ref 0–0.7)
EOSINOPHIL NFR BLD AUTO: 3 %
ERYTHROCYTE [DISTWIDTH] IN BLOOD BY AUTOMATED COUNT: 3.76 M/UL (ref 4.3–5.9)
ERYTHROCYTE [DISTWIDTH] IN BLOOD: 15.4 % (ref 11.5–15.5)
GLUCOSE SERPL-MCNC: 106 MG/DL (ref 74–99)
HCT VFR BLD AUTO: 34.8 % (ref 39–53)
HGB BLD-MCNC: 11.3 GM/DL (ref 13–17.5)
LYMPHOCYTES # SPEC AUTO: 0.8 K/UL (ref 1–4.8)
LYMPHOCYTES NFR SPEC AUTO: 17 %
MCH RBC QN AUTO: 30.1 PG (ref 25–35)
MCHC RBC AUTO-ENTMCNC: 32.5 G/DL (ref 31–37)
MCV RBC AUTO: 92.4 FL (ref 80–100)
MONOCYTES # BLD AUTO: 0.4 K/UL (ref 0–1)
MONOCYTES NFR BLD AUTO: 8 %
NEUTROPHILS # BLD AUTO: 3.1 K/UL (ref 1.3–7.7)
NEUTROPHILS NFR BLD AUTO: 70 %
PLATELET # BLD AUTO: 163 K/UL (ref 150–450)
POTASSIUM SERPL-SCNC: 3.8 MMOL/L (ref 3.5–5.1)
PROT SERPL-MCNC: 5.3 G/DL (ref 6.3–8.2)
SODIUM SERPL-SCNC: 139 MMOL/L (ref 137–145)
WBC # BLD AUTO: 4.4 K/UL (ref 3.8–10.6)

## 2018-08-05 RX ADMIN — LEVOFLOXACIN SCH MLS/HR: 750 INJECTION, SOLUTION INTRAVENOUS at 15:22

## 2018-08-05 RX ADMIN — PANTOPRAZOLE SODIUM SCH MG: 40 TABLET, DELAYED RELEASE ORAL at 19:59

## 2018-08-05 RX ADMIN — LEVOTHYROXINE SODIUM SCH MCG: 0.12 TABLET ORAL at 06:26

## 2018-08-05 RX ADMIN — FLUDROCORTISONE ACETATE SCH MG: 0.1 TABLET ORAL at 09:01

## 2018-08-05 RX ADMIN — I-VITE, TAB 1000-60-2MG (60/BT) SCH EACH: TAB at 09:02

## 2018-08-05 RX ADMIN — ISOSORBIDE MONONITRATE SCH MG: 30 TABLET, EXTENDED RELEASE ORAL at 09:01

## 2018-08-05 RX ADMIN — CARVEDILOL SCH MG: 12.5 TABLET, FILM COATED ORAL at 17:12

## 2018-08-05 RX ADMIN — CARVEDILOL SCH MG: 12.5 TABLET, FILM COATED ORAL at 09:01

## 2018-08-05 RX ADMIN — METRONIDAZOLE SCH MLS/HR: 500 INJECTION, SOLUTION INTRAVENOUS at 09:01

## 2018-08-05 RX ADMIN — I-VITE, TAB 1000-60-2MG (60/BT) SCH EACH: TAB at 19:58

## 2018-08-05 RX ADMIN — PANTOPRAZOLE SODIUM SCH MG: 40 TABLET, DELAYED RELEASE ORAL at 09:02

## 2018-08-05 RX ADMIN — ENOXAPARIN SODIUM SCH MG: 40 INJECTION SUBCUTANEOUS at 09:02

## 2018-08-05 RX ADMIN — ASPIRIN 325 MG ORAL TABLET SCH MG: 325 PILL ORAL at 19:58

## 2018-08-05 RX ADMIN — METRONIDAZOLE SCH: 500 INJECTION, SOLUTION INTRAVENOUS at 16:36

## 2018-08-05 RX ADMIN — ATORVASTATIN CALCIUM SCH MG: 20 TABLET, FILM COATED ORAL at 19:58

## 2018-08-05 RX ADMIN — ACETAMINOPHEN PRN MG: 325 TABLET, FILM COATED ORAL at 18:23

## 2018-08-05 NOTE — P.PN
Subjective


Progress Note Date: 08/05/18





this is a pleasant 85-year-old gentleman patient of Dr. Gil.  He has 

underlying history of hyperlipidemia hypertension hypothyroidism P VD, CAD, COPD

, severe GERD, squamous cell carcinoma left lung, with wedge resection of mass 

from the left lingula August 2017 admitted to the emergency room secondary to 

abdominal pain and diarrhea.patient has had symptoms of approximately 3-4 

dayswith worsening of symptoms, has persistence of diarrhea, no sick contacts 

patient denies any fever, has some nausea without vomiting, and was 

subsequently seen in the emergency room.patient denies any previous C. diff 

colitis in the past





Emergency room, white cell count of 5.8,hemoglobin of 12.5, creatinine of 1.0, 

liver function test is unremarkable, urinalysis was negative for hematuria, has 

3 rbc's, 4 wbc's, and hyaline casts,CAT scan of the abdomen and pelvis shows 

fatty liver, no hepatic masses noted however evaluation is limited, mild 

pancreatic atrophy,with short segment sigmoid diverticulitis noted, findings on 

the transverse ascending and splenic flexure: Without any pneumoperitoneum or 

pericolonic fluid collection to suggest abscess.  There is also an increasing 

size of the known mediastinal adenopathy seen as previous CT July 2017 now 

measuring 2.7 cm.  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 

well as proximal internal iliac arteries with fem-fem bypass there is also 

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  Patient 

currently is admitted for diverticulitis, stools has been sent for C. diff 

toxin evaluation patient started on IV Levaquin and I be the Flagyl stools 

currently pending





8/4 Patient examined at bedside. Patient denies any abdominal pain , or any 

episode of diarrhoea since yesterday. Initiated on clear liquid diet, tolersted 

well.  Gastroenterology consult for diverticulitis. 


date/5 patient examined bedside appears to be doing well.  Abdominal pain has 

improved.  No bowel movement since yesterday.  Anterior antibiotics.  Possible 

discharge in the next 24 hours





Objective





- Vital Signs


Vital signs: 


 Vital Signs











Temp  98.0 F   08/05/18 06:19


 


Pulse  81   08/05/18 06:19


 


Resp  18   08/05/18 06:19


 


BP  147/76   08/05/18 06:19


 


Pulse Ox  91 L  08/05/18 06:19








 Intake & Output











 08/04/18 08/05/18 08/05/18





 18:59 06:59 18:59


 


Intake Total 1200  


 


Balance 1200  


 


Intake:   


 


  Oral 1200  


 


Other:   


 


  Voiding Method  Toilet 


 


  # Voids 1 2 














- Exam





- Constitutional


General appearance: average body habitus, cooperative, no acute distress





- EENT


Eyes: anicteric sclerae, EOMI, PERRLA, dentition normal


ENT: hearing grossly normal, normal oropharynx





- Neck


Neck: normal ROM





- Respiratory


Respiratory: bilateral: CTA, negative: diminished





- Cardiovascular


Rhythm: regular


Heart sounds: normal: S1, S2


Abnormal Heart Sounds: systolic murmur, no diastolic murmur, no rub, no S3 

Gallop, no S4 Gallop, no click, no other





- Gastrointestinal


General gastrointestinal: decreased bowel sounds, soft, tenderness on left abd 

on deep palpation





- Integumentary


Integumentary: decreased turgor, normal





- Neurologic


Neurologic: CNII-XII intact





- Musculoskeletal


Musculoskeletal: gait normal, strength equal bilaterally





- Psychiatric


Psychiatric: A&O x's 3, appropriate affect, intact judgment & insight





- Labs


CBC & Chem 7: 


 08/05/18 07:15





 08/05/18 07:15


Labs: 


 Abnormal Lab Results - Last 24 Hours (Table)











  08/04/18 08/05/18 08/05/18 Range/Units





  07:45 07:15 07:15 


 


RBC   3.76 L   (4.30-5.90)  m/uL


 


Hgb   11.3 L   (13.0-17.5)  gm/dL


 


Hct   34.8 L   (39.0-53.0)  %


 


Lymphocytes #   0.8 L   (1.0-4.8)  k/uL


 


Chloride    111 H  ()  mmol/L


 


Glucose    106 H  (74-99)  mg/dL


 


Hemoglobin A1c  8.0 H    (4.0-6.0)  %


 


Calcium    8.1 L  (8.4-10.2)  mg/dL


 


Total Protein    5.3 L  (6.3-8.2)  g/dL


 


Albumin    2.9 L  (3.5-5.0)  g/dL








 Microbiology - Last 24 Hours (Table)











 08/03/18 12:00 Urine Culture - Final





 Urine,Voided 














Assessment and Plan


Plan: 





1. acute abdominal pain with diverticulitis, presenting with diarrhea, and 

dehydration.  Patient is on Levaquin and Flagyl, IV treatments, IV fluid, 

stools to be sent for C. diff toxin, electric to be replace, monitor CBC, no 

evidence of acute peritonitis or small bowel obstruction, or small bowel 

perforation, continue to monitor, patient might need general surgeon should 

this happen.  Bowel rest with clear liquid diet, advance as tolerated








2.  Historyleft lung squamous cell carcinoma with wedge resection Status post 

thoracotomy and wedge resection of lingular mass. last 09/07/2017 pathology sent

,    There is also some non-caseating granulomas which could be consistent with 

sarcoidosis.   





3.  CAD. Known to have coronary disease from previous heart cath with medical 

management, stable, asymptomatic on Imdur 30 daily, Coreg, Lipitor, Norvasc asa 

325 daily





4. COPD: No Current symptoms. Continue patient on updrafts of albuterol/

ipratropium.





5. Hypertension: Continue Carvedilol 12.5 mg g twice a day, Amlodipine 10 mg 

daily, and Imdur 30 mg daily





6. Hyperlipidemia:Zocor 40 at home  initial meds





7. Hypothyroidism:  levothyroxine increased 125 g daily. no changes





8. Severe GERD: On Protonix 40 mg daily





9. Chronic supplementation with Florinef 0.05 mg daily possibly for autonomic 

dysfunction rather than adrenal insufficiency.





12.  Mild anemia possibly blood loss related, monitor for decline, CBC is 

stable. 





13. CKD stage II. Nephrotoxins will be avoided, will continue to monitor CMP 

closely.





14. Impaired random blood sugars suspect diabetes mellitus type 2 last, 

hemoglobin A1c known at7.4.we'll get a new level XLs








GI prophylaxis Protonix 40mg QD 





DVT prophylaxis with subcutaneous heparin and GIL hose

## 2018-08-06 VITALS — HEART RATE: 88 BPM | SYSTOLIC BLOOD PRESSURE: 119 MMHG | DIASTOLIC BLOOD PRESSURE: 66 MMHG

## 2018-08-06 VITALS — TEMPERATURE: 98.1 F

## 2018-08-06 LAB
BASOPHILS # BLD AUTO: 0 K/UL (ref 0–0.2)
BASOPHILS NFR BLD AUTO: 0 %
EOSINOPHIL # BLD AUTO: 0.2 K/UL (ref 0–0.7)
EOSINOPHIL NFR BLD AUTO: 5 %
ERYTHROCYTE [DISTWIDTH] IN BLOOD BY AUTOMATED COUNT: 4.09 M/UL (ref 4.3–5.9)
ERYTHROCYTE [DISTWIDTH] IN BLOOD: 15 % (ref 11.5–15.5)
HCT VFR BLD AUTO: 37.2 % (ref 39–53)
HGB BLD-MCNC: 11.8 GM/DL (ref 13–17.5)
LYMPHOCYTES # SPEC AUTO: 0.7 K/UL (ref 1–4.8)
LYMPHOCYTES NFR SPEC AUTO: 20 %
MCH RBC QN AUTO: 29 PG (ref 25–35)
MCHC RBC AUTO-ENTMCNC: 31.8 G/DL (ref 31–37)
MCV RBC AUTO: 91.1 FL (ref 80–100)
MONOCYTES # BLD AUTO: 0.4 K/UL (ref 0–1)
MONOCYTES NFR BLD AUTO: 12 %
NEUTROPHILS # BLD AUTO: 2 K/UL (ref 1.3–7.7)
NEUTROPHILS NFR BLD AUTO: 59 %
PLATELET # BLD AUTO: 186 K/UL (ref 150–450)
WBC # BLD AUTO: 3.4 K/UL (ref 3.8–10.6)

## 2018-08-06 RX ADMIN — ISOSORBIDE MONONITRATE SCH MG: 30 TABLET, EXTENDED RELEASE ORAL at 08:03

## 2018-08-06 RX ADMIN — LEVOTHYROXINE SODIUM SCH MCG: 0.12 TABLET ORAL at 06:13

## 2018-08-06 RX ADMIN — PANTOPRAZOLE SODIUM SCH MG: 40 TABLET, DELAYED RELEASE ORAL at 08:03

## 2018-08-06 RX ADMIN — ENOXAPARIN SODIUM SCH MG: 40 INJECTION SUBCUTANEOUS at 08:03

## 2018-08-06 RX ADMIN — FLUDROCORTISONE ACETATE SCH: 0.1 TABLET ORAL at 08:03

## 2018-08-06 RX ADMIN — CARVEDILOL SCH MG: 12.5 TABLET, FILM COATED ORAL at 08:05

## 2018-08-06 RX ADMIN — I-VITE, TAB 1000-60-2MG (60/BT) SCH EACH: TAB at 08:05

## 2018-08-06 NOTE — CONS
CONSULTATION



DATE OF SERVICE:

08/05/2018.



REASON FOR THE CONSULTATION:

Acute diarrhea and lower abdominal pain.



HISTORY OF PRESENT ILLNESS:

The patient is an 85-year-old pleasant white male who was admitted to the hospital

because of lower abdominal pain and diarrhea for the last 3 to 4 days duration.  He was

having 20 bowel movements daily. He called Dr. Weber and he was subsequently advised to

go to the emergency room and was admitted to the hospital for further evaluation.  In

the ER he had a CT scan of the abdomen and pelvis that showed evidence of thickening of

the colon, splenic flexure, descending colon, sigmoid colon, possible acute sigmoid

diverticulitis.  He was started on Flagyl and Levaquin and he is feeling much better.

Abdominal pain has improved. Since being in the hospital, the diarrhea has completely

resolved.



PAST MEDICAL HISTORY:

Significant for hypertension, hyperlipidemia, gastroesophageal reflux disease, cardiac

arrhythmia, coronary artery disease.



MEDICATIONS:

At home, Zestril, _____, Prilosec, Synthroid, Imdur, Combivent, Florinef, Coreg,

Ecotrin.



ALLERGIES:

Iodine.



SOCIAL HISTORY:

No smoking or alcohol use.



FAMILY HISTORY:

Unremarkable.



REVIEW OF SYSTEMS:

CARDIOPULMONARY: No chest pain or shortness of breath.

GENITOURINARY: No dysuria or hematuria.

MUSCULOSKELETAL: Unremarkable.

SKIN: Unremarkable.

PSYCHIATRY: Unremarkable.

NEUROLOGY: Unremarkable

ENDOCRINE:  Unremarkable.

PSYCHIATRIC: Unremarkable.

NEUROLOGY: Unremarkable.

CONSTITUTIONAL: He denies any weight loss.  No fevers, chills, night sweats.



PAST SURGICAL HISTORY:

Lung cancer for which he underwent surgery in September 2017, appendectomy,

cholecystectomy, CABG.



PHYSICAL EXAMINATION:

He appears comfortable, no apparent distress.  Blood pressure 162/74, pulse 75,

temperature 97.

HEENT: Unremarkable. Conjunctivae pink. Sclerae anicteric. Oral cavity no lesions.

NECK: No JVD. No lymph node enlargement.

CHEST: Clear to auscultation.

HEART:  Regular rate and rhythm.

ABDOMEN:  Soft.  Bowel sounds are positive.  No organomegaly.

EXTREMITIES: No pedal edema.

SKIN: No rashes.

NEURO: Alert, oriented x3.  No focal deficits.



LABORATORY DATA:

Done today, WBC 4.4, hemoglobin 11.3, platelets are normal.  Basic metabolic panel is

within normal limits.



IMPRESSION:

Acute onset of diarrhea for the last 3 to 4 days duration with bowel movements anywhere

from 15 to 20 a day which are loose to watery in consistency ______ colitis. Most

likely we are dealing with infectious colitis, ______, possible acute sigmoid

diverticulitis. In any event, the patient is on Levaquin and Flagyl and symptoms are

significantly improved.  In fact, since being in the hospital he did not have any bowel

sounds and hence stool studies or cultures could not be done.



RECOMMENDATIONS:

1. Advance diet as tolerated.

2. Continue with IV antibiotics for today and tomorrow can be switched to oral

    antibiotics.

3. Follow up in 2 weeks.





MMODL / IJN: [id number] / Job#: [id number]

## 2018-08-07 NOTE — P.DS
Providers


Date of admission: 


08/03/18 15:03





Expected date of discharge: 08/06/18


Attending physician: 


Sonido Weber





Consults: 





 





08/04/18 12:28


Consult Physician Routine 


   Consulting Provider: Denisse Myrick


   Consult Reason/Comments: Acute diverticulitis


   Do you want consulting provider notified?: Yes











Primary care physician: 


[facility] Course: 








this is a pleasant 85-year-old gentleman patient of Dr. Montoya.  He has 

underlying history of hyperlipidemia hypertension hypothyroidism P VD, CAD, COPD

, severe GERD, squamous cell carcinoma left lung, with wedge resection of mass 

from the left lingula August 2017 admitted to the emergency room secondary to 

abdominal pain and diarrhea.patient has had symptoms of approximately 3-4 

dayswith worsening of symptoms, has persistence of diarrhea, no sick contacts 

patient denies any fever, has some nausea without vomiting, and was 

subsequently seen in the emergency room.patient denies any previous C. diff 

colitis in the past





Emergency room, white cell count of 5.8,hemoglobin of 12.5, creatinine of 1.0, 

liver function test is unremarkable, urinalysis was negative for hematuria, has 

3 rbc's, 4 wbc's, and hyaline casts,CAT scan of the abdomen and pelvis shows 

fatty liver, no hepatic masses noted however evaluation is limited, mild 

pancreatic atrophy,with short segment sigmoid diverticulitis noted, findings on 

the transverse ascending and splenic flexure: Without any pneumoperitoneum or 

pericolonic fluid collection to suggest abscess.  There is also an increasing 

size of the known mediastinal adenopathy seen as previous CT July 2017 now 

measuring 2.7 cm.  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 

well as proximal internal iliac arteries with fem-fem bypass there is also 

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  Patient 

currently is admitted for diverticulitis, stools has been sent for C. diff 

toxin evaluation patient started on IV Levaquin and I be the Flagyl stools 

currently pending





8/4 Patient examined at bedside. Patient denies any abdominal pain , or any 

episode of diarrhoea since yesterday. Initiated on clear liquid diet, tolersted 

well.  Gastroenterology consult for diverticulitis. 





8/5 patient examined bedside appears to be doing well.  Abdominal pain has 

improved.  No bowel movement since yesterday.  Anterior antibiotics.  Possible 

discharge in the next 24 hours





8/6: Patient has been seen by Dr. Luceor with recommendations to advance diet 

as tolerated and continue antibiotics.  Follow-up in 2 weeks.  Patient has 

significantly improved symptoms today and will be discharged home in stable 

condition.  At the time of discharge, WBC is 3.4.





Discharge diagnoses: 


1. Acute abdominal pain with diverticulitis, presenting with diarrhea, and 

dehydration.  


2.  History left lung squamous cell carcinoma with wedge resection Status post 

thoracotomy and wedge resection of lingular mass. 


3.  CAD. Known to have coronary disease from previous heart cath with medical 

management, stable


4. COPD without exacerbation


5. Hypertension


6. Hyperlipidemia


7. Hypothyroidism


8. Severe GERD


9. Autonomic dysfunction 


12. Mild anemia possibly blood loss related


13. CKD stage II. 


14. Impaired random blood sugars suspect diabetes mellitus type 2


Discharge plan: Return home with [facility]





Impression and plan of care have been directed as dictated by the signing 

physician.  Olivia Block nurse practitioner acting as scribe for signing 

physician.


Patient Condition at Discharge: Good





Plan - Discharge Summary


New Discharge Prescriptions: 


New


   Levofloxacin [Levaquin] 750 mg PO DAILY@1600 #8 tab


   metroNIDAZOLE [Flagyl] 500 mg PO Q8HR #24 tab


   Ondansetron [Zofran] 4 mg PO Q8HR PRN #20 tab


     PRN Reason: Nausea





Continue


   Omeprazole [PriLOSEC] 20 mg PO BID


   Isosorbide Mononitrate ER [Imdur] 30 mg PO DAILY


   Temazepam [Restoril] 15 mg PO HS PRN


     PRN Reason: Insomnia


   Aspirin EC [Ecotrin] 325 mg PO DAILY


   Simvastatin [Zocor] 40 mg PO HS


   Fludrocortisone [Florinef] 0.05 mg PO DAILY


   Carvedilol [Coreg] 12.5 mg PO BID


   Ipratropium/Albuterol Sulfate [Combivent Respimat Inhaler] 1 puff INHALATION 

RT-QID PRN


     PRN Reason: Dyspnea


   Vit C/E/Zn/Coppr/Lutein/Zeaxan [Preservision Areds 2 Softgel] 1 cap PO BID


   Levothyroxine Sodium [Synthroid] 125 mcg PO DAILY


Discharge Medication List





Aspirin EC [Ecotrin] 325 mg PO DAILY 05/28/15 [History]


Isosorbide Mononitrate ER [Imdur] 30 mg PO DAILY 05/28/15 [History]


Omeprazole [PriLOSEC] 20 mg PO BID 05/28/15 [History]


Temazepam [Restoril] 15 mg PO HS PRN 05/28/15 [History]


Fludrocortisone [Florinef] 0.05 mg PO DAILY 03/04/17 [History]


Simvastatin [Zocor] 40 mg PO HS 03/04/17 [History]


Carvedilol [Coreg] 12.5 mg PO BID 08/29/17 [History]


Ipratropium/Albuterol Sulfate [Combivent Respimat Inhaler] 1 puff INHALATION RT-

QID PRN 08/29/17 [History]


Vit C/E/Zn/Coppr/Lutein/Zeaxan [Preservision Areds 2 Softgel] 1 cap PO BID 07/17 /18 [History]


Levothyroxine Sodium [Synthroid] 125 mcg PO DAILY 08/03/18 [History]


Levofloxacin [Levaquin] 750 mg PO DAILY@1600 #8 tab 08/06/18 [Rx]


Ondansetron [Zofran] 4 mg PO Q8HR PRN #20 tab 08/06/18 [Rx]


metroNIDAZOLE [Flagyl] 500 mg PO Q8HR #24 tab 08/06/18 [Rx]








Follow up Appointment(s)/Referral(s): 


Sonido Weber MD [Primary Care Provider] - 08/16/18 2:00 pm


Denisse Myrick MD [STAFF PHYSICIAN] - 08/27/18 12:45 pm


Patient Instructions/Handouts:  Diverticulitis (DC)


Activity/Diet/Wound Care/Special Instructions: 


Soft diet


Discharge Disposition: HOME SELF-CARE

## 2019-08-25 ENCOUNTER — HOSPITAL ENCOUNTER (EMERGENCY)
Dept: HOSPITAL 47 - EC | Age: 84
Discharge: HOME | End: 2019-08-25
Payer: MEDICARE

## 2019-08-25 VITALS — DIASTOLIC BLOOD PRESSURE: 86 MMHG | SYSTOLIC BLOOD PRESSURE: 186 MMHG | HEART RATE: 80 BPM

## 2019-08-25 VITALS — TEMPERATURE: 97.6 F

## 2019-08-25 VITALS — RESPIRATION RATE: 18 BRPM

## 2019-08-25 DIAGNOSIS — Z88.8: ICD-10-CM

## 2019-08-25 DIAGNOSIS — E78.5: ICD-10-CM

## 2019-08-25 DIAGNOSIS — Z79.899: ICD-10-CM

## 2019-08-25 DIAGNOSIS — E07.9: ICD-10-CM

## 2019-08-25 DIAGNOSIS — Z95.5: ICD-10-CM

## 2019-08-25 DIAGNOSIS — Z79.02: ICD-10-CM

## 2019-08-25 DIAGNOSIS — I10: ICD-10-CM

## 2019-08-25 DIAGNOSIS — Z95.820: ICD-10-CM

## 2019-08-25 DIAGNOSIS — Z85.118: ICD-10-CM

## 2019-08-25 DIAGNOSIS — K57.32: Primary | ICD-10-CM

## 2019-08-25 DIAGNOSIS — K21.9: ICD-10-CM

## 2019-08-25 DIAGNOSIS — Z79.82: ICD-10-CM

## 2019-08-25 DIAGNOSIS — Z79.84: ICD-10-CM

## 2019-08-25 DIAGNOSIS — Z79.890: ICD-10-CM

## 2019-08-25 LAB
ALBUMIN SERPL-MCNC: 3.8 G/DL (ref 3.5–5)
ALP SERPL-CCNC: 102 U/L (ref 38–126)
ALT SERPL-CCNC: 13 U/L (ref 21–72)
ANION GAP SERPL CALC-SCNC: 9 MMOL/L
AST SERPL-CCNC: 19 U/L (ref 17–59)
BASOPHILS # BLD AUTO: 0 K/UL (ref 0–0.2)
BASOPHILS NFR BLD AUTO: 1 %
BUN SERPL-SCNC: 20 MG/DL (ref 9–20)
CALCIUM SPEC-MCNC: 9.3 MG/DL (ref 8.4–10.2)
CHLORIDE SERPL-SCNC: 105 MMOL/L (ref 98–107)
CO2 SERPL-SCNC: 23 MMOL/L (ref 22–30)
EOSINOPHIL # BLD AUTO: 0.3 K/UL (ref 0–0.7)
EOSINOPHIL NFR BLD AUTO: 5 %
ERYTHROCYTE [DISTWIDTH] IN BLOOD BY AUTOMATED COUNT: 4.07 M/UL (ref 4.3–5.9)
ERYTHROCYTE [DISTWIDTH] IN BLOOD: 14.1 % (ref 11.5–15.5)
GLUCOSE SERPL-MCNC: 132 MG/DL (ref 74–99)
HCT VFR BLD AUTO: 37 % (ref 39–53)
HGB BLD-MCNC: 12.3 GM/DL (ref 13–17.5)
LYMPHOCYTES # SPEC AUTO: 1.1 K/UL (ref 1–4.8)
LYMPHOCYTES NFR SPEC AUTO: 17 %
MCH RBC QN AUTO: 30.1 PG (ref 25–35)
MCHC RBC AUTO-ENTMCNC: 33.2 G/DL (ref 31–37)
MCV RBC AUTO: 90.8 FL (ref 80–100)
MONOCYTES # BLD AUTO: 0.6 K/UL (ref 0–1)
MONOCYTES NFR BLD AUTO: 10 %
NEUTROPHILS # BLD AUTO: 4 K/UL (ref 1.3–7.7)
NEUTROPHILS NFR BLD AUTO: 65 %
PH UR: 5 [PH] (ref 5–8)
PLATELET # BLD AUTO: 217 K/UL (ref 150–450)
POTASSIUM SERPL-SCNC: 4.5 MMOL/L (ref 3.5–5.1)
PROT SERPL-MCNC: 6.8 G/DL (ref 6.3–8.2)
SODIUM SERPL-SCNC: 137 MMOL/L (ref 137–145)
SP GR UR: 1.01 (ref 1–1.03)
UROBILINOGEN UR QL STRIP: <2 MG/DL (ref ?–2)
WBC # BLD AUTO: 6.1 K/UL (ref 3.8–10.6)

## 2019-08-25 PROCEDURE — 74177 CT ABD & PELVIS W/CONTRAST: CPT

## 2019-08-25 PROCEDURE — 85025 COMPLETE CBC W/AUTO DIFF WBC: CPT

## 2019-08-25 PROCEDURE — 76870 US EXAM SCROTUM: CPT

## 2019-08-25 PROCEDURE — 36415 COLL VENOUS BLD VENIPUNCTURE: CPT

## 2019-08-25 PROCEDURE — 96361 HYDRATE IV INFUSION ADD-ON: CPT

## 2019-08-25 PROCEDURE — 83690 ASSAY OF LIPASE: CPT

## 2019-08-25 PROCEDURE — 99284 EMERGENCY DEPT VISIT MOD MDM: CPT

## 2019-08-25 PROCEDURE — 93975 VASCULAR STUDY: CPT

## 2019-08-25 PROCEDURE — 80053 COMPREHEN METABOLIC PANEL: CPT

## 2019-08-25 PROCEDURE — 96374 THER/PROPH/DIAG INJ IV PUSH: CPT

## 2019-08-25 PROCEDURE — 81003 URINALYSIS AUTO W/O SCOPE: CPT

## 2019-08-25 PROCEDURE — 96375 TX/PRO/DX INJ NEW DRUG ADDON: CPT

## 2019-08-25 PROCEDURE — 83605 ASSAY OF LACTIC ACID: CPT

## 2019-08-25 NOTE — CT
EXAMINATION TYPE: CT abdomen pelvis w con

 

DATE OF EXAM: 8/25/2019

 

COMPARISON: 8/3/2018

 

HISTORY: LLQ pain. Pt states he has hx of diverticulitis. Hx appy, lorna, heart cath.

 

CT DLP: 1150.7 mGycm

Automated exposure control for dose reduction was used.

 

TECHNIQUE:  Helical acquisition of images was performed from the lung bases through the pelvis.

 

CONTRAST: 

Performed without Oral Contrast and with IV Contrast, patient injected with 100 mL of Isovue 300.

 

FINDINGS: 

 

There is some mild subsegmental atelectasis at the lung bases. There is no pleural effusion. There is
 no pericardial effusion. There is atherosclerotic vascular calcification. There is dense coronary ar
jane calcification. There are enlarged subcarinal and left perihilar lymph nodes also present on old 
CT scan of the chest of 11/28/2017 and probably not changed.

 

Stomach appears normal. There are clips from cholecystectomy. Liver spleen pancreas appear normal. Bi
le ducts are not dilated.

 

There is no adrenal mass. There is 1 cm cortical cyst upper pole right kidney. Kidneys have normal si
ze. There is no hydronephrosis. Ureters are not dilated. Abdominal aorta is atheromatous. There are m
ultiple enlarged mesenteric and celiac lymph nodes up to 1.5 cm.

 

Bladder distends smoothly. There is no inguinal hernia.

 

There are numerous diverticula in the descending colon and sigmoid colon. There is mild fat stranding
 around the proximal sigmoid colon. Appendix is not definitely seen. There is no sign of thickened ap
pendix. There is multilevel spondylotic changes in the lumbar spine. There is no compression fracture
. Bony pelvis is intact. There is a femoral-femoral artery bypass graft that appears patent. There is
 occlusion of the right external iliac artery. There is probably also occlusion right common iliac ar
jane. Unchanged.

IMPRESSION: 

MODERATE SIGMOID DIVERTICULOSIS. THERE IS MILD DIVERTICULITIS OF THE PROXIMAL SIGMOID COLON THAT APPE
ARS WORSE THAN OLD CT SCAN.

Mediastinal and bronchial adenopathy probably not changed compared to old CT scan. Retroperitoneal up
per abdominal lymph nodes unchanged.

## 2019-08-25 NOTE — US
EXAMINATION TYPE: US scrotum with doppler.  Grayscale and color Doppler Duplex imaging performed of simon london scrotum.

 

DATE OF EXAM: 8/25/2019

 

COMPARISON: NONE

 

CLINICAL HISTORY: Pain. Bilateral testicular pain

 

 

EXAM MEASUREMENTS:

 

TESTICLES:

Right Testicle:  1.5 x 1.1 x 1.5 cm

Left Testicle:  2.3 x 1.2 x 1.9 cm

 

EPIDIDYMIS HEAD:

Right Epididymis:  0.8 x 0.9 x 1.2 cm

Left Epididymis:  0.9 x 0.6 x 1.5 cm  

 

Doppler performed to assess for testicular vascularity; good bilateral color flow and waveforms are s
een. Unable to obtain venous flow with the right testicle.

 

Presence of hydroceles:  right 3.9cm, left 2.0cm

Presence of varicoceles:  prominent vessels seen superior to left testicle 

 

Right testicle measures small in size and more heterogeneous when compared to left testicle.

 

 

 

IMPRESSION: Right testicle atrophy. Mild right-sided hydrocele. No testicular torsion or mass.

## 2019-08-25 NOTE — ED
Abdominal Pain HPI





- General


Chief Complaint: Abdominal Pain


Stated Complaint: Abd pain


Source: patient


Mode of arrival: ambulatory


Limitations: no limitations





- History of Present Illness


Initial Comments: 





The patient is an 86-year-old male with past medical history of diverticulosis 

who presents emergency Department with reported left lower quadrant abdominal 

pain.  He states the pain started last night.  He has had some diarrhea for the 

past week.  States it is not black or tarry.  The pain is described as a sharp 

shooting pain which radiates into his testicles.  He reports to decreased 

frequency of urination.  Also admits to dysuria however denies hematuria.  The 

diarrhea has improved.  States that he is now having normal bowel movements.  He

denies any back or flank pain.  He was concerned for diverticulitis and 

therefore presented to the emergency room for evaluation.  He did take Motrin 

800 mg at home for his pain.  He denies any fevers or chills.  No nausea or 

vomiting.  There are no other symptoms to include headache, unilateral numbness 

or weakness, presyncope.  There are no other alleviating, Perceptin or modifying

factors





- Related Data


                                Home Medications











 Medication  Instructions  Recorded  Confirmed


 


Aspirin EC [Ecotrin] 325 mg PO DAILY 05/28/15 08/25/19


 


Isosorbide Mononitrate ER [Imdur] 30 mg PO DAILY 05/28/15 08/25/19


 


Omeprazole [PriLOSEC] 20 mg PO BID 05/28/15 08/25/19


 


Temazepam [Restoril] 15 mg PO HS PRN 05/28/15 08/25/19


 


Fludrocortisone [Florinef] 0.05 mg PO DAILY 03/04/17 08/25/19


 


Simvastatin [Zocor] 40 mg PO HS 03/04/17 08/25/19


 


Carvedilol [Coreg] 12.5 mg PO BID 08/29/17 08/25/19


 


Vit C/E/Zn/Coppr/Lutein/Zeaxan 1 cap PO BID 07/17/18 08/25/19





[Preservision Areds 2 Softgel]   


 


Levothyroxine Sodium [Synthroid] 125 mcg PO DAILY 08/03/18 08/25/19


 


Albuterol Inhaler [Ventolin Hfa 1 - 2 puff INHALATION RT-Q6H PRN 08/25/19 08/25/19





Inhaler]   


 


Losartan [Cozaar] 25 mg PO DAILY 08/25/19 08/25/19


 


metFORMIN HCL [Glucophage] 500 mg PO BID 08/25/19 08/25/19








                                  Previous Rx's











 Medication  Instructions  Recorded


 


Amoxicillin/Potassium Clav 1 tab PO Q12HR #20 tab 08/25/19





[Augmentin 875-125 Tablet]  


 


Hydrocodone/Acetaminophen [Norco 1 tab PO Q6HR PRN #12 tab 08/25/19





5-325]  











                                    Allergies











Allergy/AdvReac Type Severity Reaction Status Date / Time


 


povidone-iodine Allergy Unknown Red Skin Verified 08/25/19 19:56





[From Betadine]     


 


soap [From Betadine] Allergy Unknown Red Skin Verified 08/25/19 19:56














Review of Systems


ROS Statement: 


Those systems with pertinent positive or pertinent negative responses have been 

documented in the HPI.





ROS Other: All systems not noted in ROS Statement are negative.





Past Medical History


Past Medical History: Cancer, COPD, Eye Disorder, GERD/Reflux, Hyperlipidemia, 

Hypertension, Thyroid Disorder


Additional Past Medical History / Comment(s): MACULAR DEGENERATION- POOR 

VISION., STATES IRREGULAR HEART BEAT, dx. lung cancer Sept. 2017, diverticular 

disease, bells palsy, had pne vaccine few years ago  not sure of ran, write 

unable to verify date at time of admit.


History of Any Multi-Drug Resistant Organisms: None Reported


Past Surgical History: Appendectomy, Cholecystectomy, Heart Catheterization, 

Orthopedic Surgery


Additional Past Surgical History / Comment(s): several heart cath's, Bifemoral 

bypass grafting (1980's), wrist surg., wedge resection lung in Sept, 2017, 

bronch w/bx  12-15-17


Past Anesthesia/Blood Transfusion Reactions: No Reported Reaction


Past Psychological History: No Psychological Hx Reported


Smoking Status: Former smoker





- Past Family History


  ** Mother


Family Medical History: CVA/TIA, Hypertension





  ** Father


Family Medical History: Cancer


Additional Family Medical History / Comment(s): CANCER OF LIP





General Exam


Limitations: no limitations





Course


                                   Vital Signs











  08/25/19 08/25/19 08/25/19





  19:00 21:50 22:20


 


Temperature 97.6 F  


 


Pulse Rate 83 82 80


 


Respiratory 16 18 18





Rate   


 


Blood Pressure 180/70 201/85 186/86


 


O2 Sat by Pulse 96 96 97





Oximetry   














Medical Decision Making





- Medical Decision Making





Upon arrival the patient is placed into room 12.  He is hooked to his pulse ox 

and cardiac monitoring.  Peripheral IV is established.  I did offer the patient 

something for pain control however he refused.  I did recommend laboratory 

studies.  The patient was sent for CT of his abdomen and pelvis with contrast.  

Previously he had been treated with the iodine prep and therefore I did give 

this to the patient today.  He was then taken for CT.  Upon return results I did

review them and discuss with the patient.  He does feel comfortable for hospital

discharge at this time.  I did provide him with a dose of Augmentin in the ED.  

Primary care physician in 2-4 days for reevaluation.  The patient has any new or

worsening symptoms she should return to the emergency room.  Patient was given a

prescription for Norco for pain control.  Side effect profile of medications 

discussed with the patient.  He does sent and opioids start talking form.  The 

patient was then discharged home in stable condition





- Lab Data


Result diagrams: 


                                 08/25/19 19:28





                                 08/25/19 19:28


                                   Lab Results











  08/25/19 08/25/19 08/25/19 Range/Units





  19:28 19:28 19:28 


 


WBC  6.1    (3.8-10.6)  k/uL


 


RBC  4.07 L    (4.30-5.90)  m/uL


 


Hgb  12.3 L    (13.0-17.5)  gm/dL


 


Hct  37.0 L    (39.0-53.0)  %


 


MCV  90.8    (80.0-100.0)  fL


 


MCH  30.1    (25.0-35.0)  pg


 


MCHC  33.2    (31.0-37.0)  g/dL


 


RDW  14.1    (11.5-15.5)  %


 


Plt Count  217    (150-450)  k/uL


 


Neutrophils %  65    %


 


Lymphocytes %  17    %


 


Monocytes %  10    %


 


Eosinophils %  5    %


 


Basophils %  1    %


 


Neutrophils #  4.0    (1.3-7.7)  k/uL


 


Lymphocytes #  1.1    (1.0-4.8)  k/uL


 


Monocytes #  0.6    (0-1.0)  k/uL


 


Eosinophils #  0.3    (0-0.7)  k/uL


 


Basophils #  0.0    (0-0.2)  k/uL


 


Sodium   137   (137-145)  mmol/L


 


Potassium   4.5   (3.5-5.1)  mmol/L


 


Chloride   105   ()  mmol/L


 


Carbon Dioxide   23   (22-30)  mmol/L


 


Anion Gap   9   mmol/L


 


BUN   20   (9-20)  mg/dL


 


Creatinine   1.09   (0.66-1.25)  mg/dL


 


Est GFR (CKD-EPI)AfAm   71   (>60 ml/min/1.73 sqM)  


 


Est GFR (CKD-EPI)NonAf   61   (>60 ml/min/1.73 sqM)  


 


Glucose   132 H   (74-99)  mg/dL


 


Plasma Lactic Acid Scott    1.4  (0.7-2.0)  mmol/L


 


Calcium   9.3   (8.4-10.2)  mg/dL


 


Total Bilirubin   0.5   (0.2-1.3)  mg/dL


 


AST   19   (17-59)  U/L


 


ALT   13 L   (21-72)  U/L


 


Alkaline Phosphatase   102   ()  U/L


 


Total Protein   6.8   (6.3-8.2)  g/dL


 


Albumin   3.8   (3.5-5.0)  g/dL


 


Lipase   109   ()  U/L


 


Urine Color     


 


Urine Appearance     (Clear)  


 


Urine pH     (5.0-8.0)  


 


Ur Specific Gravity     (1.001-1.035)  


 


Urine Protein     (Negative)  


 


Urine Glucose (UA)     (Negative)  


 


Urine Ketones     (Negative)  


 


Urine Blood     (Negative)  


 


Urine Nitrite     (Negative)  


 


Urine Bilirubin     (Negative)  


 


Urine Urobilinogen     (<2.0)  mg/dL


 


Ur Leukocyte Esterase     (Negative)  














  08/25/19 Range/Units





  19:28 


 


WBC   (3.8-10.6)  k/uL


 


RBC   (4.30-5.90)  m/uL


 


Hgb   (13.0-17.5)  gm/dL


 


Hct   (39.0-53.0)  %


 


MCV   (80.0-100.0)  fL


 


MCH   (25.0-35.0)  pg


 


MCHC   (31.0-37.0)  g/dL


 


RDW   (11.5-15.5)  %


 


Plt Count   (150-450)  k/uL


 


Neutrophils %   %


 


Lymphocytes %   %


 


Monocytes %   %


 


Eosinophils %   %


 


Basophils %   %


 


Neutrophils #   (1.3-7.7)  k/uL


 


Lymphocytes #   (1.0-4.8)  k/uL


 


Monocytes #   (0-1.0)  k/uL


 


Eosinophils #   (0-0.7)  k/uL


 


Basophils #   (0-0.2)  k/uL


 


Sodium   (137-145)  mmol/L


 


Potassium   (3.5-5.1)  mmol/L


 


Chloride   ()  mmol/L


 


Carbon Dioxide   (22-30)  mmol/L


 


Anion Gap   mmol/L


 


BUN   (9-20)  mg/dL


 


Creatinine   (0.66-1.25)  mg/dL


 


Est GFR (CKD-EPI)AfAm   (>60 ml/min/1.73 sqM)  


 


Est GFR (CKD-EPI)NonAf   (>60 ml/min/1.73 sqM)  


 


Glucose   (74-99)  mg/dL


 


Plasma Lactic Acid Scott   (0.7-2.0)  mmol/L


 


Calcium   (8.4-10.2)  mg/dL


 


Total Bilirubin   (0.2-1.3)  mg/dL


 


AST   (17-59)  U/L


 


ALT   (21-72)  U/L


 


Alkaline Phosphatase   ()  U/L


 


Total Protein   (6.3-8.2)  g/dL


 


Albumin   (3.5-5.0)  g/dL


 


Lipase   ()  U/L


 


Urine Color  Yellow  


 


Urine Appearance  Clear  (Clear)  


 


Urine pH  5.0  (5.0-8.0)  


 


Ur Specific Gravity  1.012  (1.001-1.035)  


 


Urine Protein  Negative  (Negative)  


 


Urine Glucose (UA)  Negative  (Negative)  


 


Urine Ketones  Negative  (Negative)  


 


Urine Blood  Negative  (Negative)  


 


Urine Nitrite  Negative  (Negative)  


 


Urine Bilirubin  Negative  (Negative)  


 


Urine Urobilinogen  <2.0  (<2.0)  mg/dL


 


Ur Leukocyte Esterase  Negative  (Negative)  














Disposition


Clinical Impression: 


 Acute diverticulitis





Disposition: HOME SELF-CARE


Condition: Stable


Instructions (If sedation given, give patient instructions):  Diverticulitis 

(ED)


Additional Instructions: 


Please follow-up with your primary care doctor in 2-4 days.  Return to the 

emergency room for any new or worsening symptoms


Prescriptions: 


Amoxicillin/Potassium Clav [Augmentin 875-125 Tablet] 1 tab PO Q12HR #20 tab


Hydrocodone/Acetaminophen [Norco 5-325] 1 tab PO Q6HR PRN #12 tab


 PRN Reason: Pain


Is patient prescribed a controlled substance at d/c from ED?: Yes


When asked, does pt state using other controlled substances?: No


If prescribed controlled substance>3 days was MAPS reviewed?: Prescribed <3 Days


If opioid is for acute pain is fill amount 7 days or less?: Yes


If Rx opioid, was Start Talking consent form obtained?: Yes


Referrals: 


Sonido Weber MD [Primary Care Provider] - 1-2 days


Time of Disposition: 22:33

## 2019-09-30 ENCOUNTER — HOSPITAL ENCOUNTER (EMERGENCY)
Dept: HOSPITAL 47 - EC | Age: 84
Discharge: HOME | End: 2019-09-30
Payer: MEDICARE

## 2019-09-30 VITALS — HEART RATE: 70 BPM | DIASTOLIC BLOOD PRESSURE: 72 MMHG | SYSTOLIC BLOOD PRESSURE: 113 MMHG

## 2019-09-30 VITALS — RESPIRATION RATE: 16 BRPM | TEMPERATURE: 97.4 F

## 2019-09-30 DIAGNOSIS — Z79.890: ICD-10-CM

## 2019-09-30 DIAGNOSIS — Z85.118: ICD-10-CM

## 2019-09-30 DIAGNOSIS — Z98.890: ICD-10-CM

## 2019-09-30 DIAGNOSIS — Z79.82: ICD-10-CM

## 2019-09-30 DIAGNOSIS — Z86.69: ICD-10-CM

## 2019-09-30 DIAGNOSIS — E78.5: ICD-10-CM

## 2019-09-30 DIAGNOSIS — Z90.49: ICD-10-CM

## 2019-09-30 DIAGNOSIS — Z79.899: ICD-10-CM

## 2019-09-30 DIAGNOSIS — Z91.048: ICD-10-CM

## 2019-09-30 DIAGNOSIS — Z79.84: ICD-10-CM

## 2019-09-30 DIAGNOSIS — Z87.891: ICD-10-CM

## 2019-09-30 DIAGNOSIS — K57.32: Primary | ICD-10-CM

## 2019-09-30 DIAGNOSIS — I10: ICD-10-CM

## 2019-09-30 DIAGNOSIS — J44.9: ICD-10-CM

## 2019-09-30 DIAGNOSIS — Z88.8: ICD-10-CM

## 2019-09-30 DIAGNOSIS — K21.9: ICD-10-CM

## 2019-09-30 DIAGNOSIS — E07.9: ICD-10-CM

## 2019-09-30 LAB
ALBUMIN SERPL-MCNC: 3.7 G/DL (ref 3.5–5)
ALP SERPL-CCNC: 80 U/L (ref 38–126)
ALT SERPL-CCNC: 25 U/L (ref 21–72)
AMYLASE SERPL-CCNC: <30 U/L (ref 30–110)
ANION GAP SERPL CALC-SCNC: 10 MMOL/L
AST SERPL-CCNC: 30 U/L (ref 17–59)
BASOPHILS # BLD AUTO: 0 K/UL (ref 0–0.2)
BASOPHILS NFR BLD AUTO: 1 %
BUN SERPL-SCNC: 16 MG/DL (ref 9–20)
CALCIUM SPEC-MCNC: 9.5 MG/DL (ref 8.4–10.2)
CHLORIDE SERPL-SCNC: 105 MMOL/L (ref 98–107)
CO2 SERPL-SCNC: 24 MMOL/L (ref 22–30)
EOSINOPHIL # BLD AUTO: 0.3 K/UL (ref 0–0.7)
EOSINOPHIL NFR BLD AUTO: 7 %
ERYTHROCYTE [DISTWIDTH] IN BLOOD BY AUTOMATED COUNT: 4.21 M/UL (ref 4.3–5.9)
ERYTHROCYTE [DISTWIDTH] IN BLOOD: 14 % (ref 11.5–15.5)
GLUCOSE SERPL-MCNC: 117 MG/DL (ref 74–99)
HCT VFR BLD AUTO: 37.2 % (ref 39–53)
HGB BLD-MCNC: 12.9 GM/DL (ref 13–17.5)
LYMPHOCYTES # SPEC AUTO: 0.8 K/UL (ref 1–4.8)
LYMPHOCYTES NFR SPEC AUTO: 20 %
MCH RBC QN AUTO: 30.6 PG (ref 25–35)
MCHC RBC AUTO-ENTMCNC: 34.7 G/DL (ref 31–37)
MCV RBC AUTO: 88.3 FL (ref 80–100)
MONOCYTES # BLD AUTO: 0.5 K/UL (ref 0–1)
MONOCYTES NFR BLD AUTO: 12 %
NEUTROPHILS # BLD AUTO: 2.3 K/UL (ref 1.3–7.7)
NEUTROPHILS NFR BLD AUTO: 57 %
PH UR: 5 [PH] (ref 5–8)
PLATELET # BLD AUTO: 219 K/UL (ref 150–450)
POTASSIUM SERPL-SCNC: 4.4 MMOL/L (ref 3.5–5.1)
PROT SERPL-MCNC: 6.7 G/DL (ref 6.3–8.2)
SODIUM SERPL-SCNC: 139 MMOL/L (ref 137–145)
SP GR UR: 1.01 (ref 1–1.03)
UROBILINOGEN UR QL STRIP: <2 MG/DL (ref ?–2)
WBC # BLD AUTO: 4.1 K/UL (ref 3.8–10.6)

## 2019-09-30 PROCEDURE — 82150 ASSAY OF AMYLASE: CPT

## 2019-09-30 PROCEDURE — 96375 TX/PRO/DX INJ NEW DRUG ADDON: CPT

## 2019-09-30 PROCEDURE — 74177 CT ABD & PELVIS W/CONTRAST: CPT

## 2019-09-30 PROCEDURE — 81003 URINALYSIS AUTO W/O SCOPE: CPT

## 2019-09-30 PROCEDURE — 96374 THER/PROPH/DIAG INJ IV PUSH: CPT

## 2019-09-30 PROCEDURE — 80053 COMPREHEN METABOLIC PANEL: CPT

## 2019-09-30 PROCEDURE — 85025 COMPLETE CBC W/AUTO DIFF WBC: CPT

## 2019-09-30 PROCEDURE — 36415 COLL VENOUS BLD VENIPUNCTURE: CPT

## 2019-09-30 PROCEDURE — 83690 ASSAY OF LIPASE: CPT

## 2019-09-30 PROCEDURE — 96361 HYDRATE IV INFUSION ADD-ON: CPT

## 2019-09-30 PROCEDURE — 99284 EMERGENCY DEPT VISIT MOD MDM: CPT

## 2019-09-30 PROCEDURE — 83605 ASSAY OF LACTIC ACID: CPT

## 2019-09-30 NOTE — CT
EXAMINATION TYPE: CT abdomen pelvis w con

 

DATE OF EXAM: 9/30/2019

 

COMPARISON: 8/25/2019

 

HISTORY: 86-year-old male LLQ pain

 

TECHNIQUE: Contiguous axial scanning of the abdomen and pelvis following administration of 100 ml Iso
roverto 300 IV contrast.  Delayed images through the kidneys and coronal/sagittal reconstructions perform
ed.

 

CT DLP: 1154.3 mGycm

Automated exposure control for dose reduction was used.

 

 

FINDINGS:

Partially visualized mediastinal lymphadenopathy measuring up to at least 2.6 cm. Left paraesophageal
 lymph nodes measure up to 1.8 cm there is strandy atelectasis or scarring in the visualized lower aydee
ngs with mild emphysematous change.

 

Moderate to severe atherosclerotic calcifications abdominal aorta and iliac arteries with a left-to-r
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 
1.8 cm on 8/25/2019. Measured 2.1 cm on 8/3/2018. Not significantly changed. There remains a severe s
tenosis just after the anastomosis on the right.

 

No focal liver lesion. Mild prominence to the bile duct is unchanged status post cholecystectomy.

 

Adrenal glands, spleen with hilar splenule, and pancreas appear within normal limits.

 

Extrarenal pelvis left kidney. Symmetric uptake and excretion of contrast from both kidneys. Subcenti
meter hypodensity lateral cortex right kidney unchanged from 8/3/2018 suggestive of a cyst.

 

No dilated small bowel, free fluid, or free air.

 

Prominent fluid-filled small bowel loops mid and lower abdomen and especially on the right side of th
e abdomen some liquid stool in the cecum. Mild overall stool burden.

 

Upper abdominal lymphadenopathy measuring up to 8 mm gastrohepatic ligament, 1.2 cm periceliac, 1.3 c
m marlo hepatic, 1.5 cm portacaval, 1 cm anterior aortocaval.

 

Improvement in the previous pericolonic fat stranding along the proximal sigmoid. Minimal residual ch
anges may be present on axial image 61. Sigmoid diverticulosis.

 

Trabeculated appearance to the bladder. Prostate gland either very small or surgically absent. Multip
le pelvic phleboliths.

 

No abnormal fluid collection the pelvis or pelvic lymphadenopathy.

 

Bones: Mild degenerative changes at the hips. Moderate multilevel degenerative disc disease upper and
 mid lumbar spine and lower thoracic spine.

 

 

IMPRESSION: 

 

1. PROMINENT FLUID-FILLED SMALL BOWEL LOOPS AND SOME LIQUID STOOL IN THE CECUM COULD REPRESENT A GENE
RALIZED ILEUS OR ENTERITIS.

2. PARTIALLY VISUALIZED MEDIASTINAL LYMPHADENOPATHY MEASURING UP TO AT LEAST 2.6 CM AND SCATTERED UPP
ER ABDOMINAL LYMPHADENOPATHY MEASURING UP TO 1.5 CM. THIS IS STABLE FROM MAY 25, 2019 AND RELATIVELY 
STABLE FROM 8/3/2018 BUT COULD BE A MILLIMETER OR 2 LARGER. CORRELATE AS TO ANY PREVIOUSLY ESTABLISHE
D DIAGNOSIS. NEOPLASTIC ETIOLOGY SUCH AS LYMPHOPROLIFERATIVE DISORDER OR INDOLENT LYMPHOMA NOT EXCLUD
ED.

3. SIGMOID DIVERTICULOSIS. THE PREVIOUS PROXIMAL SIGMOID DIVERTICULITIS APPEARS TO HAVE LARGELY RESOL
GLORIA. MINIMAL RESIDUAL CHANGES MAY BE PRESENT.

4. 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
D IS SIMILAR SEVERE ATELECTATIC NARROWING AT THE DISTAL ANASTOMOSIS.

## 2019-09-30 NOTE — ED
Abdominal Pain HPI





- General


Source: patient, RN notes reviewed


Mode of arrival: ambulatory


Limitations: no limitations





<Presley Morrison - Last Filed: 09/30/19 12:31>





<Eber Klein - Last Filed: 09/30/19 17:52>





- General


Chief Complaint: Abdominal Pain


Stated Complaint: abdominal & testicle pain


Time Seen by Provider: 09/30/19 09:41





- History of Present Illness


Initial Comments: 





86-year-old male presents emergency Department with chief complaint of abdominal

discomfort.  Patient states he has been having some ongoing issue since 

beginning of August.  He is had 2 prior ER visits in one hospitalization for 

diverticulitis.  Patient states that this pain is worsening again states she's 

having increased diarrhea.  Patient states his first visit he is placed on 

Augmentin which did not help he was given Flagyl by his PCP.  Patient states at 

this point this pain seems similar to his prior.  He denies any urinary 

symptoms.  Patient states pain is radiating into his scrotum.  Patient denies 

any melena or hematochezia.  Patient denies any known fever or chills or night 

sweats. (Presley Morrison)





- Related Data


                                Home Medications











 Medication  Instructions  Recorded  Confirmed


 


Aspirin EC [Ecotrin] 325 mg PO DAILY 05/28/15 09/30/19


 


Isosorbide Mononitrate ER [Imdur] 30 mg PO DAILY 05/28/15 09/30/19


 


Omeprazole [PriLOSEC] 20 mg PO BID 05/28/15 09/30/19


 


Temazepam [Restoril] 15 mg PO HS PRN 05/28/15 09/30/19


 


Fludrocortisone [Florinef] 0.05 mg PO DAILY 03/04/17 09/30/19


 


Simvastatin [Zocor] 40 mg PO HS 03/04/17 09/30/19


 


Carvedilol [Coreg] 12.5 mg PO BID 08/29/17 09/30/19


 


Vit C/E/Zn/Coppr/Lutein/Zeaxan 1 cap PO BID 07/17/18 09/30/19





[Preservision Areds 2 Softgel]   


 


Levothyroxine Sodium [Synthroid] 125 mcg PO DAILY 08/03/18 09/30/19


 


Losartan [Cozaar] 25 mg PO DAILY 08/25/19 09/30/19


 


metFORMIN HCL [Glucophage] 500 mg PO BID 08/25/19 09/30/19


 


Diphenox-Atrop 2.5-0.025 mg 1 tab PO DAILY 09/30/19 09/30/19





[Lomotil]   


 


metroNIDAZOLE [Flagyl] 250 mg PO TID 09/30/19 09/30/19








                                  Previous Rx's











 Medication  Instructions  Recorded


 


Ciprofloxacin HCl [Cipro] 500 mg PO Q12HR #20 tablet 09/30/19


 


metroNIDAZOLE [Flagyl] 500 mg PO TID #30 tab 09/30/19











                                    Allergies











Allergy/AdvReac Type Severity Reaction Status Date / Time


 


povidone-iodine Allergy Unknown Red Skin Verified 09/30/19 10:00





[From Betadine]     


 


soap [From Betadine] Allergy Unknown Red Skin Verified 09/30/19 10:00














Review of Systems


ROS Other: All systems not noted in ROS Statement are negative.





<Presley Morrison - Last Filed: 09/30/19 12:31>


ROS Other: All systems not noted in ROS Statement are negative.





<Eber Klein - Last Filed: 09/30/19 17:52>


ROS Statement: 


Those systems with pertinent positive or pertinent negative responses have been 

documented in the HPI.








Past Medical History


Past Medical History: Cancer, COPD, Eye Disorder, GERD/Reflux, Hyperlipidemia, 

Hypertension, Thyroid Disorder


Additional Past Medical History / Comment(s): MACULAR DEGENERATION- POOR 

VISION., STATES IRREGULAR HEART BEAT, dx. lung cancer Sept. 2017, diverticular 

disease, bells palsy, had pne vaccine few years ago  not sure of ran, write 

unable to verify date at time of admit.


History of Any Multi-Drug Resistant Organisms: None Reported


Past Surgical History: Appendectomy, Cholecystectomy, Heart Catheterization, 

Orthopedic Surgery


Additional Past Surgical History / Comment(s): several heart cath's, Bifemoral 

bypass grafting (1980's), wrist surg., wedge resection lung in Sept, 2017, 

bronch w/bx  12-15-17


Past Anesthesia/Blood Transfusion Reactions: No Reported Reaction


Past Psychological History: No Psychological Hx Reported


Smoking Status: Former smoker


Past Alcohol Use History: None Reported


Past Drug Use History: None Reported





- Past Family History


  ** Mother


Family Medical History: CVA/TIA, Hypertension





  ** Father


Family Medical History: Cancer


Additional Family Medical History / Comment(s): CANCER OF LIP





<Presley Morrison - Last Filed: 09/30/19 12:31>





General Exam


Limitations: no limitations


General appearance: alert, in no apparent distress


Head exam: Present: atraumatic, normocephalic, normal inspection


Eye exam: Present: normal appearance, PERRL, EOMI.  Absent: scleral icterus, 

conjunctival injection, periorbital swelling


ENT exam: Present: normal exam, normal oropharynx, mucous membranes moist


Neck exam: Present: normal inspection, full ROM.  Absent: tenderness, 

meningismus, lymphadenopathy


Respiratory exam: Present: normal lung sounds bilaterally.  Absent: respiratory 

distress, wheezes, rales, rhonchi, stridor


Cardiovascular Exam: Present: regular rate, normal rhythm, normal heart sounds. 

Absent: systolic murmur, diastolic murmur, rubs, gallop, clicks


GI/Abdominal exam: Present: soft, tenderness, normal bowel sounds.  Absent: 

distended, guarding, rebound, rigid


Back exam: Absent: CVA tenderness (R), CVA tenderness (L)


Neurological exam: Present: alert, oriented X3


Skin exam: Present: warm, dry, intact, normal color.  Absent: rash





<Presley Morrison - Last Filed: 09/30/19 12:31>





Course





<Eber Klein - Last Filed: 09/30/19 17:52>





                                   Vital Signs











  09/30/19 09/30/19





  09:28 12:35


 


Temperature 97.4 F L 


 


Pulse Rate 71 70


 


Respiratory 16 16





Rate  


 


Blood Pressure 168/83 113/72


 


O2 Sat by Pulse 97 98





Oximetry  














- Reevaluation(s)


Reevaluation #1: 





09/30/19 17:51


PA supervision: I personally evaluate this case the patient does demonstrate 

evidence of diverticulitis.  He is a candidate for outpatient treatment does 

want to try this without admission.  He is aware that he may have to come back 

and be admitted if outpatient treatment is not successful.  I do agree with this

assessment and plan (Eber Klein)





Medical Decision Making





- Lab Data


Result diagrams: 


                                 09/30/19 10:20





                                 09/30/19 10:20





<Presley Morrison - Last Filed: 09/30/19 12:31>





- Lab Data


Result diagrams: 


                                 09/30/19 10:20





                                 09/30/19 10:20





<Eber Klein - Last Filed: 09/30/19 17:52>





- Medical Decision Making


86 show male presented from for lower abdominal pain CT was performed which 

shows mild diverticulitis.  Patient is stable for discharge labs were drawn 

which are essentially unremarkable.  Patient agrees with follow-up outpatient


 (Presley Morrison)





- Lab Data





                                   Lab Results











  09/30/19 09/30/19 09/30/19 Range/Units





  10:20 10:20 10:20 


 


WBC   4.1   (3.8-10.6)  k/uL


 


RBC   4.21 L   (4.30-5.90)  m/uL


 


Hgb   12.9 L   (13.0-17.5)  gm/dL


 


Hct   37.2 L   (39.0-53.0)  %


 


MCV   88.3   (80.0-100.0)  fL


 


MCH   30.6   (25.0-35.0)  pg


 


MCHC   34.7   (31.0-37.0)  g/dL


 


RDW   14.0   (11.5-15.5)  %


 


Plt Count   219   (150-450)  k/uL


 


Neutrophils %   57   %


 


Lymphocytes %   20   %


 


Monocytes %   12   %


 


Eosinophils %   7   %


 


Basophils %   1   %


 


Neutrophils #   2.3   (1.3-7.7)  k/uL


 


Lymphocytes #   0.8 L   (1.0-4.8)  k/uL


 


Monocytes #   0.5   (0-1.0)  k/uL


 


Eosinophils #   0.3   (0-0.7)  k/uL


 


Basophils #   0.0   (0-0.2)  k/uL


 


Sodium  139    (137-145)  mmol/L


 


Potassium  4.4    (3.5-5.1)  mmol/L


 


Chloride  105    ()  mmol/L


 


Carbon Dioxide  24    (22-30)  mmol/L


 


Anion Gap  10    mmol/L


 


BUN  16    (9-20)  mg/dL


 


Creatinine  0.99    (0.66-1.25)  mg/dL


 


Est GFR (CKD-EPI)AfAm  79    (>60 ml/min/1.73 sqM)  


 


Est GFR (CKD-EPI)NonAf  69    (>60 ml/min/1.73 sqM)  


 


Glucose  117 H    (74-99)  mg/dL


 


Plasma Lactic Acid Scott    1.8  (0.7-2.0)  mmol/L


 


Calcium  9.5    (8.4-10.2)  mg/dL


 


Total Bilirubin  0.4    (0.2-1.3)  mg/dL


 


AST  30    (17-59)  U/L


 


ALT  25    (21-72)  U/L


 


Alkaline Phosphatase  80    ()  U/L


 


Total Protein  6.7    (6.3-8.2)  g/dL


 


Albumin  3.7    (3.5-5.0)  g/dL


 


Amylase  <30 L    ()  U/L


 


Lipase  84    ()  U/L


 


Urine Color     


 


Urine Appearance     (Clear)  


 


Urine pH     (5.0-8.0)  


 


Ur Specific Gravity     (1.001-1.035)  


 


Urine Protein     (Negative)  


 


Urine Glucose (UA)     (Negative)  


 


Urine Ketones     (Negative)  


 


Urine Blood     (Negative)  


 


Urine Nitrite     (Negative)  


 


Urine Bilirubin     (Negative)  


 


Urine Urobilinogen     (<2.0)  mg/dL


 


Ur Leukocyte Esterase     (Negative)  














  09/30/19 Range/Units





  10:30 


 


WBC   (3.8-10.6)  k/uL


 


RBC   (4.30-5.90)  m/uL


 


Hgb   (13.0-17.5)  gm/dL


 


Hct   (39.0-53.0)  %


 


MCV   (80.0-100.0)  fL


 


MCH   (25.0-35.0)  pg


 


MCHC   (31.0-37.0)  g/dL


 


RDW   (11.5-15.5)  %


 


Plt Count   (150-450)  k/uL


 


Neutrophils %   %


 


Lymphocytes %   %


 


Monocytes %   %


 


Eosinophils %   %


 


Basophils %   %


 


Neutrophils #   (1.3-7.7)  k/uL


 


Lymphocytes #   (1.0-4.8)  k/uL


 


Monocytes #   (0-1.0)  k/uL


 


Eosinophils #   (0-0.7)  k/uL


 


Basophils #   (0-0.2)  k/uL


 


Sodium   (137-145)  mmol/L


 


Potassium   (3.5-5.1)  mmol/L


 


Chloride   ()  mmol/L


 


Carbon Dioxide   (22-30)  mmol/L


 


Anion Gap   mmol/L


 


BUN   (9-20)  mg/dL


 


Creatinine   (0.66-1.25)  mg/dL


 


Est GFR (CKD-EPI)AfAm   (>60 ml/min/1.73 sqM)  


 


Est GFR (CKD-EPI)NonAf   (>60 ml/min/1.73 sqM)  


 


Glucose   (74-99)  mg/dL


 


Plasma Lactic Acid Scott   (0.7-2.0)  mmol/L


 


Calcium   (8.4-10.2)  mg/dL


 


Total Bilirubin   (0.2-1.3)  mg/dL


 


AST   (17-59)  U/L


 


ALT   (21-72)  U/L


 


Alkaline Phosphatase   ()  U/L


 


Total Protein   (6.3-8.2)  g/dL


 


Albumin   (3.5-5.0)  g/dL


 


Amylase   ()  U/L


 


Lipase   ()  U/L


 


Urine Color  Yellow  


 


Urine Appearance  Clear  (Clear)  


 


Urine pH  5.0  (5.0-8.0)  


 


Ur Specific Gravity  1.010  (1.001-1.035)  


 


Urine Protein  Negative  (Negative)  


 


Urine Glucose (UA)  Negative  (Negative)  


 


Urine Ketones  Negative  (Negative)  


 


Urine Blood  Negative  (Negative)  


 


Urine Nitrite  Negative  (Negative)  


 


Urine Bilirubin  Negative  (Negative)  


 


Urine Urobilinogen  <2.0  (<2.0)  mg/dL


 


Ur Leukocyte Esterase  Negative  (Negative)  














Disposition


Is patient prescribed a controlled substance at d/c from ED?: No


Time of Disposition: 12:32





<Presley Morrison - Last Filed: 09/30/19 12:31>





<Eber Klein - Last Filed: 09/30/19 17:52>


Clinical Impression: 


 Acute diverticulitis





Disposition: HOME SELF-CARE


Condition: Stable


Instructions (If sedation given, give patient instructions):  Diverticulitis 

(ED), Diverticulitis Diet (ED)


Additional Instructions: 


Please return to the Emergency Department if symptoms worsen or any other 

concerns.


Prescriptions: 


Ciprofloxacin HCl [Cipro] 500 mg PO Q12HR #20 tablet


metroNIDAZOLE [Flagyl] 500 mg PO TID #30 tab


Referrals: 


Sonido Weber MD [Primary Care Provider] - 1-2 days

## 2022-01-21 ENCOUNTER — HOSPITAL ENCOUNTER (OUTPATIENT)
Dept: HOSPITAL 47 - RADPETMAIN | Age: 87
Discharge: HOME | End: 2022-01-21
Attending: INTERNAL MEDICINE
Payer: MEDICARE

## 2022-01-21 DIAGNOSIS — C34.90: Primary | ICD-10-CM

## 2022-01-21 PROCEDURE — 78815 PET IMAGE W/CT SKULL-THIGH: CPT

## 2022-01-24 NOTE — PE
EXAMINATION TYPE: PET CT fusion skull to thigh

 

DATE OF EXAM: 1/21/2022

 

COMPARISON: Prior PET/CT July 29, 2017.

 

HISTORY: Squamous cell left-sided lung cancer.   

 

TECHNIQUE:  Following the intravenous administration of 13.97 mCi of F-18 FDG, whole body images are 
performed from the skull base to the midthigh.  Images are reviewed on the computer in the coronal, a
xial, and sagittal planes.  Reconstructed rotating images are created on independent workstation and 
reviewed on the computer.   A localization and attenuation correction CT is performed in conjunction 
with the PET scan. Blood glucose level equals 131.

 

SCAN: Subsequent Scan

 

FINDINGS: 

 

SKULL BASE AND NECK:  No new areas of abnormal hypermetabolic uptake. Stable prominent left lower nec
k and supraclavicular lymph nodes with Max SUV less than 2.5.

 

CHEST, MEDIASTINUM, AND HILAR REGION: Interval surgical change anterior left mid lung at site of prio
r hypermetabolic nodule.

 

Recurrent abnormal enlarged and hypermetabolic thoracic lymph nodes. For reference there is Abnormal 
subcarinal lymph node measuring 3.9 x 1.9 cm axial image 88 has max SUV 4.52 current study. Abnormal 
right hilar lymph node same image measures 1.5 x 1.4 cm, has max SUV of 3.62 on the current study. Ab
normal right tracheobronchial lymph node measures 2.2 x 2.1 cm with abnormal hypermetabolic uptake ax
ial image 78, max SUV 4.56 current study. Overall max SUV diminished from prior exam. Size fairly sta
ble from 2017 PET/CT. No new areas of abnormal hypermetabolic uptake.

 

ABDOMEN AND PELVIS: No new areas of abnormal hypermetabolic uptake.

 

OSSEOUS STRUCTURES:   No new areas of abnormal hypermetabolic uptake. 

 

OTHER CT: Moderate to severe calcified plaque in the aorta extends into branch vessels. Moderate to s
evere calcified plaque bilateral carotid bulb level. Tiny pericardial effusion. Cholecystectomy clips
 are present. Surgical changes bilateral groin region. Exaggerated curvature with multilevel facet ar
thropathy in the lumbar spine.

 

IMPRESSION: Recurrent active neoplasm suspected given abnormal enlarged hypermetabolic thoracic lymph
 nodes again seen.

## 2022-07-20 ENCOUNTER — HOSPITAL ENCOUNTER (OUTPATIENT)
Dept: HOSPITAL 47 - RADCTMAIN | Age: 87
Discharge: HOME | End: 2022-07-20
Attending: INTERNAL MEDICINE
Payer: MEDICARE

## 2022-07-20 DIAGNOSIS — R59.0: Primary | ICD-10-CM

## 2022-07-20 LAB — BUN SERPL-SCNC: 16 MG/DL (ref 9–20)

## 2022-07-20 PROCEDURE — 84520 ASSAY OF UREA NITROGEN: CPT

## 2022-07-20 PROCEDURE — 71260 CT THORAX DX C+: CPT

## 2022-07-20 PROCEDURE — 82565 ASSAY OF CREATININE: CPT

## 2022-07-20 NOTE — CT
EXAMINATION TYPE: CT chest w con

 

DATE OF EXAM: 7/20/2022

 

COMPARISON: PET scan 1/21/2022

 

HISTORY: enlarged lymphnodes

 

CT DLP: 398.3 mGycm

Automated exposure control for dose reduction was used.

 

TECHNIQUE: 

CT scan of the chest is performed with IV Contrast, patient injected with 70 mL of Isovue 300.  MIP I
mages are created on CT scanner and reviewed. 3D reconstructed images are created on an independent w
orkstation and reviewed.

 

FINDINGS:

 

LUNGS: The lungs are grossly clear, there is no concerning parenchymal mass or nodule identified.   T
here is no pleural effusion or pneumothorax seen.  The tracheobronchial tree is patent. Emphysematous
 changes are seen. Subsegmental nodular density along subpleural superior segment left lower lobe is 
seen and could be postinflammatory measures 1.1 cm and stable.

 

MEDIASTINUM: A coronary artery calcification is seen. Heart size stable. Atherosclerotic change of th
e aorta. No diagnostic evidence of aneurysm. Calcification of the aortic valve noted.

Stable subcarinal lymph node which retrospectively measured 5.1 x 2.7 cm x 6.1.

Stable left hilar lymphadenopathy measuring short axis of 2.3 cm.

Stable right hilar lymphadenopathy measuring 1.5 x 1.4 cm.

Stable pretracheal lymphadenopathy measuring 2.2 x 2.1 cm. 

 

OTHER:  Hypertrophic and degenerative changes of the spine.

 

IMPRESSION:  

1. Stable extensive bilateral hilar and mediastinal lymphadenopathy unchanged from previous PET scan.


2. 1.1 cm subpleural superior segment left lower lobe nodule stable from prior exam.

3. COPD with coronary artery calcification.